# Patient Record
Sex: MALE | Race: WHITE | NOT HISPANIC OR LATINO | Employment: OTHER | URBAN - METROPOLITAN AREA
[De-identification: names, ages, dates, MRNs, and addresses within clinical notes are randomized per-mention and may not be internally consistent; named-entity substitution may affect disease eponyms.]

---

## 2018-01-04 ENCOUNTER — APPOINTMENT (OUTPATIENT)
Dept: RADIOLOGY | Facility: MEDICAL CENTER | Age: 48
End: 2018-01-04
Attending: EMERGENCY MEDICINE
Payer: COMMERCIAL

## 2018-01-04 ENCOUNTER — HOSPITAL ENCOUNTER (EMERGENCY)
Facility: MEDICAL CENTER | Age: 48
End: 2018-01-04
Attending: EMERGENCY MEDICINE
Payer: COMMERCIAL

## 2018-01-04 VITALS
OXYGEN SATURATION: 93 % | DIASTOLIC BLOOD PRESSURE: 92 MMHG | WEIGHT: 200 LBS | RESPIRATION RATE: 16 BRPM | TEMPERATURE: 96.8 F | HEIGHT: 70 IN | SYSTOLIC BLOOD PRESSURE: 152 MMHG | HEART RATE: 72 BPM | BODY MASS INDEX: 28.63 KG/M2

## 2018-01-04 DIAGNOSIS — S93.402A SPRAIN OF LEFT ANKLE, UNSPECIFIED LIGAMENT, INITIAL ENCOUNTER: ICD-10-CM
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
## 2018-01-04 DIAGNOSIS — S09.90XA CLOSED HEAD INJURY, INITIAL ENCOUNTER: ICD-10-CM

## 2018-01-04 DIAGNOSIS — S16.1XXA STRAIN OF NECK MUSCLE, INITIAL ENCOUNTER: ICD-10-CM

## 2018-01-04 PROCEDURE — 72125 CT NECK SPINE W/O DYE: CPT

## 2018-01-04 PROCEDURE — 305948 HCHG GREEN TRAUMA ACT PRE-NOTIFY NO CC

## 2018-01-04 PROCEDURE — 71045 X-RAY EXAM CHEST 1 VIEW: CPT

## 2018-01-04 PROCEDURE — 70450 CT HEAD/BRAIN W/O DYE: CPT

## 2018-01-04 PROCEDURE — 73610 X-RAY EXAM OF ANKLE: CPT | Mod: LT

## 2018-01-04 PROCEDURE — 99285 EMERGENCY DEPT VISIT HI MDM: CPT

## 2018-01-04 PROCEDURE — 700111 HCHG RX REV CODE 636 W/ 250 OVERRIDE (IP): Performed by: EMERGENCY MEDICINE

## 2018-01-04 PROCEDURE — 96374 THER/PROPH/DIAG INJ IV PUSH: CPT

## 2018-01-04 RX ORDER — CYCLOBENZAPRINE HCL 10 MG
10 TABLET ORAL 3 TIMES DAILY PRN
Qty: 20 TAB | Refills: 0 | Status: SHIPPED | OUTPATIENT
Start: 2018-01-04 | End: 2018-09-04

## 2018-01-04 RX ORDER — OMEPRAZOLE 20 MG/1
20 CAPSULE, DELAYED RELEASE ORAL DAILY
Status: SHIPPED | COMMUNITY
End: 2023-06-22

## 2018-01-04 RX ORDER — KETOROLAC TROMETHAMINE 30 MG/ML
30 INJECTION, SOLUTION INTRAMUSCULAR; INTRAVENOUS ONCE
Status: COMPLETED | OUTPATIENT
Start: 2018-01-04 | End: 2018-01-04

## 2018-01-04 RX ORDER — CYCLOBENZAPRINE HCL 5 MG
5-10 TABLET ORAL 3 TIMES DAILY PRN
Qty: 30 TAB | Refills: 0 | Status: SHIPPED | OUTPATIENT
Start: 2018-01-04 | End: 2018-09-04

## 2018-01-04 RX ADMIN — KETOROLAC TROMETHAMINE 30 MG: 30 INJECTION, SOLUTION INTRAMUSCULAR at 14:23

## 2018-01-04 ASSESSMENT — PAIN SCALES - GENERAL: PAINLEVEL_OUTOF10: ASSUMED PAIN PRESENT
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
## 2018-01-04 NOTE — ED NOTES
Report received, assuming care.  Pt returns from radiology, awaiting results.  Multiple visitors at the bedside.
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
## 2018-01-05 NOTE — ED NOTES
Air splint applied.  Pt ready for discharge.  Pt dc instructions provided.  Pt verbalized understanding & signed.  Leaves ER ambulatory, steady gait, no distress.  All possessions with pt upon discharge.

## 2018-09-04 ENCOUNTER — OFFICE VISIT (OUTPATIENT)
Dept: URGENT CARE | Facility: PHYSICIAN GROUP | Age: 48
End: 2018-09-04
Payer: COMMERCIAL

## 2018-09-04 VITALS
DIASTOLIC BLOOD PRESSURE: 102 MMHG | RESPIRATION RATE: 16 BRPM | HEART RATE: 94 BPM | HEIGHT: 71 IN | SYSTOLIC BLOOD PRESSURE: 140 MMHG | TEMPERATURE: 98.2 F | OXYGEN SATURATION: 95 % | BODY MASS INDEX: 29.96 KG/M2 | WEIGHT: 214 LBS

## 2018-09-04 DIAGNOSIS — S39.012A STRAIN OF LUMBAR REGION, INITIAL ENCOUNTER: ICD-10-CM

## 2018-09-04 PROCEDURE — 99214 OFFICE O/P EST MOD 30 MIN: CPT | Performed by: FAMILY MEDICINE

## 2018-09-04 RX ORDER — IBUPROFEN 200 MG
200 TABLET ORAL EVERY 6 HOURS PRN
COMMUNITY
End: 2018-09-04

## 2018-09-04 RX ORDER — DICLOFENAC SODIUM 75 MG/1
75 TABLET, DELAYED RELEASE ORAL 2 TIMES DAILY
Qty: 60 TAB | Refills: 0 | Status: ON HOLD | OUTPATIENT
Start: 2018-09-04 | End: 2021-01-06

## 2018-09-04 RX ORDER — CYCLOBENZAPRINE HCL 10 MG
10 TABLET ORAL 3 TIMES DAILY PRN
Qty: 20 TAB | Refills: 0 | Status: ON HOLD | OUTPATIENT
Start: 2018-09-04 | End: 2021-01-06

## 2018-09-04 ASSESSMENT — ENCOUNTER SYMPTOMS
COUGH: 0
FEVER: 0
CHILLS: 0
BACK PAIN: 1
VOMITING: 0
SHORTNESS OF BREATH: 0
NAUSEA: 0
WEIGHT LOSS: 0
DIARRHEA: 0
ABDOMINAL PAIN: 0
HEADACHES: 0
SORE THROAT: 0

## 2018-09-04 ASSESSMENT — PAIN SCALES - GENERAL: PAINLEVEL: 7=MODERATE-SEVERE PAIN

## 2018-09-04 NOTE — PROGRESS NOTES
Subjective:     Prakash Kaur is a 47 y.o. male who presents for Back Injury (x2days )       HPI   2 days ago was getting out of a raised truck and landed hard on his feet.  Felt he landed a little awkwardly and off balance.  Immediately had increase in back pain   Back pain is constant and dull, worst in the low back  No radiating pain down the legs   Sitting in recliner helps pain the most  Sitting without a back rest hurts   Pain is bilateral low back   Has tried Advil and a heating pad with some improvement   Pt with hx of back pain for the past 8 months which is intermittent and was feeling good before this new injury     Past Medical History:   Diagnosis Date   • Allergic rhinitis    • Anesthesia     doesn't do well with novacaine/lidocaine, ponv   • Arthritis    • Asthma    • Asthma    • Eosinophilic esophagitis 2011    Per GI Biopsy   • GERD (gastroesophageal reflux disease)    • Indigestion      Past Surgical History:   Procedure Laterality Date   • INGUINAL HERNIA LAPAROSCOPIC  8/29/2012    Performed by ROLAND POLLOCK at SUR[facility]S   • INGUINAL HERNIA REPAIR  11/30/2011    Performed by ROLAND POLLOCK at SUR[facility] OR[facility]   • EGD WITH ASP/BX  10/11    Dr Brice.  BX negative for Piper's     Social History     Social History   • Marital status:      Spouse name: N/A   • Number of children: N/A   • Years of education: N/A     Occupational History   •  [employer]     Social History Main Topics   • Smoking status: Never Smoker   • Smokeless tobacco: Never Used   • Alcohol use Yes      Comment: 2 days per week   • Drug use: No   • Sexual activity: Yes     Partners: Female     Other Topics Concern   • Not on file     Social History Narrative    ** Merged History Encounter **           Family History   Problem Relation Age of Onset   • Stroke Paternal Uncle    • Cancer Maternal Grandmother    • Cancer Paternal Grandmother    • Cancer Paternal  "Grandfather    • Heart Disease Mother    • Lung Disease Daughter     Review of Systems   Constitutional: Negative for chills, fever and weight loss.   HENT: Negative for congestion and sore throat.    Respiratory: Negative for cough and shortness of breath.    Cardiovascular: Negative for chest pain.   Gastrointestinal: Negative for abdominal pain, diarrhea, nausea and vomiting.   Musculoskeletal: Positive for back pain.   Skin: Negative for rash.   Neurological: Negative for headaches.     Allergies   Allergen Reactions   • Nkda [No Known Drug Allergy]       Objective:   /102   Pulse 94   Temp 36.8 °C (98.2 °F)   Resp 16   Ht 1.803 m (5' 11\")   Wt 97.1 kg (214 lb)   SpO2 95%   BMI 29.85 kg/m²   Physical Exam   Constitutional: He appears well-developed and well-nourished. No distress.   HENT:   Head: Normocephalic and atraumatic.   Right Ear: External ear normal.   Left Ear: External ear normal.   Neck: Normal range of motion. No tracheal deviation present.   Cardiovascular: Normal rate, regular rhythm and normal heart sounds.    Pulmonary/Chest: Effort normal and breath sounds normal. No respiratory distress. He has no wheezes. He has no rales.   Musculoskeletal:   Back:  General: No asymmetry, bruising, or erythema appreciated  ROM: flexion 60°, extension 10°, lateral bend 30°, lateral twist 30°  Palpation: No tenderness to palpation of spinous processes, no step-offs appreciated, mild TTP of paraspinal muscles, no significant scoliosis appreciated  Strength: 5/5 hip/knee/ankle flexion/extension  Special tests: Straight leg raise -   Neuro: Sensation intact and equal bilaterally in LE's     Neurological: He is alert.   Skin: Skin is dry. No rash noted. He is not diaphoretic.   Psychiatric: He has a normal mood and affect.         Assessment/Plan:   Assessment    1. Strain of lumbar region, initial encounter  Advised that pt keep walking and moving, and advised against total rest.  Will give diclofenac " 75mg BID and Flexeril to be used at night.  Pt has hurt back previously and may need to be referred to PT if not rapidly improving over next 2 weeks.   - cyclobenzaprine (FLEXERIL) 10 MG Tab; Take 1 Tab by mouth 3 times a day as needed.  Dispense: 20 Tab; Refill: 0  - diclofenac EC (VOLTAREN) 75 MG Tablet Delayed Response; Take 1 Tab by mouth 2 times a day.  Dispense: 60 Tab; Refill: 0    F/U if not improving over next 2 weeks         I have reviewed and agree with history, assessment and plan for this office encounter with Dr. Patterson  Face to face encounter/direct observation:  YES   Suggested changes to plan or follow-up: NONE  [place] M.D.

## 2019-05-18 ENCOUNTER — APPOINTMENT (OUTPATIENT)
Dept: RADIOLOGY | Facility: MEDICAL CENTER | Age: 49
End: 2019-05-18
Payer: COMMERCIAL

## 2019-05-18 ENCOUNTER — HOSPITAL ENCOUNTER (EMERGENCY)
Facility: MEDICAL CENTER | Age: 49
End: 2019-05-18
Payer: COMMERCIAL

## 2019-05-18 VITALS
BODY MASS INDEX: 30.46 KG/M2 | RESPIRATION RATE: 18 BRPM | SYSTOLIC BLOOD PRESSURE: 143 MMHG | TEMPERATURE: 97.9 F | OXYGEN SATURATION: 94 % | DIASTOLIC BLOOD PRESSURE: 106 MMHG | HEIGHT: 71 IN | HEART RATE: 89 BPM | WEIGHT: 217.59 LBS

## 2019-05-18 LAB
ALBUMIN SERPL BCP-MCNC: 4.4 G/DL (ref 3.2–4.9)
ALBUMIN/GLOB SERPL: 1.4 G/DL
ALP SERPL-CCNC: 55 U/L (ref 30–99)
ALT SERPL-CCNC: 24 U/L (ref 2–50)
ANION GAP SERPL CALC-SCNC: 9 MMOL/L (ref 0–11.9)
AST SERPL-CCNC: 17 U/L (ref 12–45)
BASOPHILS # BLD AUTO: 0.9 % (ref 0–1.8)
BASOPHILS # BLD: 0.05 K/UL (ref 0–0.12)
BILIRUB SERPL-MCNC: 1.1 MG/DL (ref 0.1–1.5)
BUN SERPL-MCNC: 16 MG/DL (ref 8–22)
CALCIUM SERPL-MCNC: 9.2 MG/DL (ref 8.4–10.2)
CHLORIDE SERPL-SCNC: 105 MMOL/L (ref 96–112)
CO2 SERPL-SCNC: 24 MMOL/L (ref 20–33)
CREAT SERPL-MCNC: 1.09 MG/DL (ref 0.5–1.4)
EKG IMPRESSION: NORMAL
EOSINOPHIL # BLD AUTO: 0.18 K/UL (ref 0–0.51)
EOSINOPHIL NFR BLD: 3.2 % (ref 0–6.9)
ERYTHROCYTE [DISTWIDTH] IN BLOOD BY AUTOMATED COUNT: 37 FL (ref 35.9–50)
GLOBULIN SER CALC-MCNC: 3.2 G/DL (ref 1.9–3.5)
GLUCOSE SERPL-MCNC: 97 MG/DL (ref 65–99)
HCT VFR BLD AUTO: 47.3 % (ref 42–52)
HGB BLD-MCNC: 16.7 G/DL (ref 14–18)
IMM GRANULOCYTES # BLD AUTO: 0.02 K/UL (ref 0–0.11)
IMM GRANULOCYTES NFR BLD AUTO: 0.4 % (ref 0–0.9)
LYMPHOCYTES # BLD AUTO: 2.05 K/UL (ref 1–4.8)
LYMPHOCYTES NFR BLD: 36 % (ref 22–41)
MCH RBC QN AUTO: 30.4 PG (ref 27–33)
MCHC RBC AUTO-ENTMCNC: 35.3 G/DL (ref 33.7–35.3)
MCV RBC AUTO: 86.2 FL (ref 81.4–97.8)
MONOCYTES # BLD AUTO: 0.35 K/UL (ref 0–0.85)
MONOCYTES NFR BLD AUTO: 6.2 % (ref 0–13.4)
NEUTROPHILS # BLD AUTO: 3.04 K/UL (ref 1.82–7.42)
NEUTROPHILS NFR BLD: 53.3 % (ref 44–72)
NRBC # BLD AUTO: 0 K/UL
NRBC BLD-RTO: 0 /100 WBC
PLATELET # BLD AUTO: 212 K/UL (ref 164–446)
PMV BLD AUTO: 10.2 FL (ref 9–12.9)
POTASSIUM SERPL-SCNC: 3.8 MMOL/L (ref 3.6–5.5)
PROT SERPL-MCNC: 7.6 G/DL (ref 6–8.2)
RBC # BLD AUTO: 5.49 M/UL (ref 4.7–6.1)
SODIUM SERPL-SCNC: 138 MMOL/L (ref 135–145)
TROPONIN I SERPL-MCNC: <0.02 NG/ML (ref 0–0.04)
WBC # BLD AUTO: 5.7 K/UL (ref 4.8–10.8)

## 2019-05-18 PROCEDURE — 85025 COMPLETE CBC W/AUTO DIFF WBC: CPT

## 2019-05-18 PROCEDURE — 302449 STATCHG TRIAGE ONLY (STATISTIC)

## 2019-05-18 PROCEDURE — 80053 COMPREHEN METABOLIC PANEL: CPT

## 2019-05-18 PROCEDURE — 84484 ASSAY OF TROPONIN QUANT: CPT

## 2019-05-18 PROCEDURE — 93005 ELECTROCARDIOGRAM TRACING: CPT

## 2019-05-19 ENCOUNTER — HOSPITAL ENCOUNTER (EMERGENCY)
Dept: HOSPITAL 8 - ED | Age: 49
Discharge: HOME | End: 2019-05-19
Payer: COMMERCIAL

## 2019-05-19 VITALS — DIASTOLIC BLOOD PRESSURE: 88 MMHG | SYSTOLIC BLOOD PRESSURE: 144 MMHG

## 2019-05-19 VITALS — HEIGHT: 71 IN | BODY MASS INDEX: 28.09 KG/M2 | WEIGHT: 200.62 LBS

## 2019-05-19 DIAGNOSIS — J45.909: ICD-10-CM

## 2019-05-19 DIAGNOSIS — K21.9: ICD-10-CM

## 2019-05-19 DIAGNOSIS — R00.2: Primary | ICD-10-CM

## 2019-05-19 LAB
ALBUMIN SERPL-MCNC: 3.7 G/DL (ref 3.4–5)
ALP SERPL-CCNC: 62 U/L (ref 45–117)
ALT SERPL-CCNC: 30 U/L (ref 12–78)
ANION GAP SERPL CALC-SCNC: 9 MMOL/L (ref 5–15)
BASOPHILS # BLD AUTO: 0.07 X10^3/UL (ref 0–0.1)
BASOPHILS NFR BLD AUTO: 1 % (ref 0–1)
BILIRUB SERPL-MCNC: 0.7 MG/DL (ref 0.2–1)
CALCIUM SERPL-MCNC: 8.6 MG/DL (ref 8.5–10.1)
CHLORIDE SERPL-SCNC: 111 MMOL/L (ref 98–107)
CREAT SERPL-MCNC: 1.07 MG/DL (ref 0.7–1.3)
EOSINOPHIL # BLD AUTO: 0.18 X10^3/UL (ref 0–0.4)
EOSINOPHIL NFR BLD AUTO: 3 % (ref 1–7)
ERYTHROCYTE [DISTWIDTH] IN BLOOD BY AUTOMATED COUNT: 12.3 % (ref 9.4–14.8)
LYMPHOCYTES # BLD AUTO: 2.02 X10^3/UL (ref 1–3.4)
LYMPHOCYTES NFR BLD AUTO: 30 % (ref 22–44)
MCH RBC QN AUTO: 31.2 PG (ref 27.5–34.5)
MCHC RBC AUTO-ENTMCNC: 33.9 G/DL (ref 33.2–36.2)
MCV RBC AUTO: 92.2 FL (ref 81–97)
MD: NO
MONOCYTES # BLD AUTO: 0.34 X10^3/UL (ref 0.2–0.8)
MONOCYTES NFR BLD AUTO: 5 % (ref 2–9)
NEUTROPHILS # BLD AUTO: 4.15 X10^3/UL (ref 1.8–6.8)
NEUTROPHILS NFR BLD AUTO: 61 % (ref 42–75)
PLATELET # BLD AUTO: 205 X10^3/UL (ref 130–400)
PMV BLD AUTO: 9 FL (ref 7.4–10.4)
PROT SERPL-MCNC: 7.2 G/DL (ref 6.4–8.2)
RBC # BLD AUTO: 5.15 X10^6/UL (ref 4.38–5.82)
T4 FREE SERPL-MCNC: 0.95 NG/DL (ref 0.76–1.46)
TROPONIN I SERPL-MCNC: < 0.015 NG/ML (ref 0–0.04)
TSH SERPL-ACNC: 1.02 MIU/L (ref 0.36–3.74)

## 2019-05-19 PROCEDURE — 36415 COLL VENOUS BLD VENIPUNCTURE: CPT

## 2019-05-19 PROCEDURE — 84439 ASSAY OF FREE THYROXINE: CPT

## 2019-05-19 PROCEDURE — 93005 ELECTROCARDIOGRAM TRACING: CPT

## 2019-05-19 PROCEDURE — 99284 EMERGENCY DEPT VISIT MOD MDM: CPT

## 2019-05-19 PROCEDURE — 71045 X-RAY EXAM CHEST 1 VIEW: CPT

## 2019-05-19 PROCEDURE — 80053 COMPREHEN METABOLIC PANEL: CPT

## 2019-05-19 PROCEDURE — 85025 COMPLETE CBC W/AUTO DIFF WBC: CPT

## 2019-05-19 PROCEDURE — 84481 FREE ASSAY (FT-3): CPT

## 2019-05-19 PROCEDURE — 84484 ASSAY OF TROPONIN QUANT: CPT

## 2019-05-19 PROCEDURE — 84443 ASSAY THYROID STIM HORMONE: CPT

## 2021-01-03 ENCOUNTER — HOSPITAL ENCOUNTER (OUTPATIENT)
Facility: MEDICAL CENTER | Age: 51
End: 2021-01-03
Attending: FAMILY MEDICINE
Payer: COMMERCIAL

## 2021-01-03 ENCOUNTER — HOSPITAL ENCOUNTER (OUTPATIENT)
Dept: RADIOLOGY | Facility: MEDICAL CENTER | Age: 51
End: 2021-01-03
Attending: FAMILY MEDICINE
Payer: COMMERCIAL

## 2021-01-03 ENCOUNTER — OFFICE VISIT (OUTPATIENT)
Dept: URGENT CARE | Facility: PHYSICIAN GROUP | Age: 51
End: 2021-01-03
Payer: COMMERCIAL

## 2021-01-03 VITALS
OXYGEN SATURATION: 96 % | HEART RATE: 81 BPM | DIASTOLIC BLOOD PRESSURE: 94 MMHG | HEIGHT: 71 IN | WEIGHT: 225 LBS | TEMPERATURE: 97.9 F | SYSTOLIC BLOOD PRESSURE: 162 MMHG | BODY MASS INDEX: 31.5 KG/M2 | RESPIRATION RATE: 16 BRPM

## 2021-01-03 DIAGNOSIS — R05.9 COUGH: ICD-10-CM

## 2021-01-03 DIAGNOSIS — J98.11 DISCOID ATELECTASIS: ICD-10-CM

## 2021-01-03 DIAGNOSIS — R06.02 SHORTNESS OF BREATH: ICD-10-CM

## 2021-01-03 DIAGNOSIS — I26.99 ACUTE PULMONARY EMBOLISM, UNSPECIFIED PULMONARY EMBOLISM TYPE, UNSPECIFIED WHETHER ACUTE COR PULMONALE PRESENT (HCC): ICD-10-CM

## 2021-01-03 DIAGNOSIS — R03.0 ELEVATED BLOOD PRESSURE READING: ICD-10-CM

## 2021-01-03 LAB
BASOPHILS # BLD AUTO: 0.4 % (ref 0–1.8)
BASOPHILS # BLD: 0.03 K/UL (ref 0–0.12)
COVID ORDER STATUS COVID19: NORMAL
D DIMER PPP IA.FEU-MCNC: 1.91 UG/ML (FEU) (ref 0–0.5)
EOSINOPHIL # BLD AUTO: 0.19 K/UL (ref 0–0.51)
EOSINOPHIL NFR BLD: 2.8 % (ref 0–6.9)
ERYTHROCYTE [DISTWIDTH] IN BLOOD BY AUTOMATED COUNT: 38.9 FL (ref 35.9–50)
HCT VFR BLD AUTO: 45.2 % (ref 42–52)
HGB BLD-MCNC: 15.2 G/DL (ref 14–18)
IMM GRANULOCYTES # BLD AUTO: 0.04 K/UL (ref 0–0.11)
IMM GRANULOCYTES NFR BLD AUTO: 0.6 % (ref 0–0.9)
LYMPHOCYTES # BLD AUTO: 2.12 K/UL (ref 1–4.8)
LYMPHOCYTES NFR BLD: 31.4 % (ref 22–41)
MCH RBC QN AUTO: 30.3 PG (ref 27–33)
MCHC RBC AUTO-ENTMCNC: 33.6 G/DL (ref 33.7–35.3)
MCV RBC AUTO: 90 FL (ref 81.4–97.8)
MONOCYTES # BLD AUTO: 0.61 K/UL (ref 0–0.85)
MONOCYTES NFR BLD AUTO: 9 % (ref 0–13.4)
NEUTROPHILS # BLD AUTO: 3.76 K/UL (ref 1.82–7.42)
NEUTROPHILS NFR BLD: 55.8 % (ref 44–72)
NRBC # BLD AUTO: 0 K/UL
NRBC BLD-RTO: 0 /100 WBC
PLATELET # BLD AUTO: 190 K/UL (ref 164–446)
PMV BLD AUTO: 10.7 FL (ref 9–12.9)
RBC # BLD AUTO: 5.02 M/UL (ref 4.7–6.1)
WBC # BLD AUTO: 6.8 K/UL (ref 4.8–10.8)

## 2021-01-03 PROCEDURE — U0003 INFECTIOUS AGENT DETECTION BY NUCLEIC ACID (DNA OR RNA); SEVERE ACUTE RESPIRATORY SYNDROME CORONAVIRUS 2 (SARS-COV-2) (CORONAVIRUS DISEASE [COVID-19]), AMPLIFIED PROBE TECHNIQUE, MAKING USE OF HIGH THROUGHPUT TECHNOLOGIES AS DESCRIBED BY CMS-2020-01-R: HCPCS

## 2021-01-03 PROCEDURE — 71046 X-RAY EXAM CHEST 2 VIEWS: CPT

## 2021-01-03 PROCEDURE — 99214 OFFICE O/P EST MOD 30 MIN: CPT | Mod: CS | Performed by: FAMILY MEDICINE

## 2021-01-03 PROCEDURE — 85379 FIBRIN DEGRADATION QUANT: CPT

## 2021-01-03 PROCEDURE — 85025 COMPLETE CBC W/AUTO DIFF WBC: CPT

## 2021-01-03 PROCEDURE — U0005 INFEC AGEN DETEC AMPLI PROBE: HCPCS

## 2021-01-03 ASSESSMENT — FIBROSIS 4 INDEX: FIB4 SCORE: 0.79

## 2021-01-03 NOTE — PROGRESS NOTES
Chief Complaint   Patient presents with   • Shortness of Breath     SOB wheezing chest pain left should pain cough, x3 days had recent COVID vacine             Cough  This is a new problem. The current episode started 3 days ago. The problem has been unchanged. The problem occurs constantly. The cough is dry.   + left upper chest pain with deep inspirations.  + sob   Has hx asthma, but denies wheezing    Pt denies: fatigue, muscle aches, fever. Pertinent negatives include no   headaches, nausea, vomiting, diarrhea, sweats, weight loss or wheezing. Nothing aggravates the symptoms.  Patient has tried nothing for the symptoms.     Denies COVID exposure     Past Medical History:   Diagnosis Date   • Eosinophilic esophagitis 2011    Per GI Biopsy   • Allergic rhinitis    • Anesthesia     doesn't do well with novacaine/lidocaine, ponv   • Arthritis    • Asthma    • Asthma    • GERD (gastroesophageal reflux disease)    • Indigestion          Social History     Tobacco Use   • Smoking status: Never Smoker   • Smokeless tobacco: Never Used   Substance Use Topics   • Alcohol use: Yes     Comment: 2 days per week   • Drug use: No         Current Outpatient Medications on File Prior to Visit   Medication Sig Dispense Refill   • omeprazole (PRILOSEC) 20 MG delayed-release capsule Take 20 mg by mouth every day.     • albuterol (VENTOLIN OR PROVENTIL) 108 (90 BASE) MCG/ACT Aero Soln inhalation aerosol Inhale 2 Puffs by mouth every four hours as needed for Shortness of Breath. 8.5 g 3   • cetirizine (ZYRTEC) 10 MG TABS Take 10 mg by mouth every day.     • lansoprazole (PREVACID) 30 MG CPDR Take 30 mg by mouth every day.     • cyclobenzaprine (FLEXERIL) 10 MG Tab Take 1 Tab by mouth 3 times a day as needed. (Patient not taking: Reported on 1/3/2021) 20 Tab 0   • diclofenac EC (VOLTAREN) 75 MG Tablet Delayed Response Take 1 Tab by mouth 2 times a day. (Patient not taking: Reported on 1/3/2021) 60 Tab 0   • azithromycin (ZITHROMAX)  "250 MG Tab Take as directed (Patient not taking: Reported on 1/3/2021) 6 Tab 0   • predniSONE (DELTASONE) 20 MG Tab 2 tabs PO daily x 3 days then 1 daily x 2 days (Patient not taking: Reported on 1/3/2021) 8 Tab 0   • doxycycline (VIBRAMYCIN) 100 MG Tab Take 1 Tab by mouth 2 times a day. (Patient not taking: Reported on 1/3/2021) 20 Tab 0   • hydrocodone-acetaminophen (NORCO) 5-325 MG TABS per tablet Take 1-2 Tabs by mouth every 6 hours as needed (severe pain ). (Patient not taking: Reported on 1/3/2021) 20 Tab 0     No current facility-administered medications on file prior to visit.              Family history was reviewed and not pertinent         Review of Systems   Constitutional: Negative for fever, chills and malaise/fatigue.   Eyes: Negative for vision changes, d/c.    Respiratory: + for cough .   Denies sputum production.    Cardiovascular: Negative for chest pain and palpitations.   Gastrointestinal: Negative for nausea, vomiting, abdominal pain, diarrhea and constipation.   Genitourinary: Negative for dysuria, urgency and frequency.   Skin: Negative for rash or  itching.   Neurological: Negative for dizziness and tingling.   Psychiatric/Behavioral: Negative for depression.   Hematologic/lymphatic - denies bruising or excessive bleeding  All other systems reviewed and are negative.         Objective:     BP (!) 162/94   Pulse 81   Temp 36.6 °C (97.9 °F)   Resp 16   Ht 1.803 m (5' 11\")   Wt 102.1 kg (225 lb)   SpO2 96%     Physical Exam   Constitutional: patient is oriented to person, place, and time. Patient appears well-developed and well-nourished. No distress.   HENT:   Head: Normocephalic and atraumatic.   Right Ear: External ear normal.   Left Ear: External ear normal.   Nose: Mucosal edema  present. Right sinus exhibits no maxillary sinus tenderness. Left sinus exhibits no maxillary sinus tenderness.   Mouth/Throat: Mucous membranes are normal. No oral lesions.  No posterior pharyngeal " erythema.  No oropharyngeal exudate or posterior oropharyngeal edema.   Eyes: Conjunctivae and EOM are normal. Pupils are equal, round, and reactive to light. Right eye exhibits no discharge. Left eye exhibits no discharge. No scleral icterus.   Neck: Normal range of motion. Neck supple. No tracheal deviation present.   Cardiovascular: Normal rate, regular rhythm and normal heart sounds.  Exam reveals no friction rub.    Pulmonary/Chest: Effort normal. No respiratory distress. Patient has no wheezes or rhonchi. Patient has no rales.   No tenderness with palpation  Musculoskeletal:  exhibits no edema.   Lymphadenopathy:     Patient has no cervical adenopathy.      Neurological: patient is alert and oriented to person, place, and time.   Skin: Skin is warm and dry. No rash noted. No erythema.   Psychiatric: patient  has a normal mood and affect.  behavior is normal.   Nursing note and vitals reviewed.            Details    Reading Physician Reading Date Result Priority   Doug Deras M.D.  [phone] 1/3/2021 Urgent Care      Narrative & Impression        1/3/2021 3:49 PM     HISTORY/REASON FOR EXAM:  Cough.        TECHNIQUE/EXAM DESCRIPTION AND NUMBER OF VIEWS:  Two views of the chest.     COMPARISON:  01/04/2018     FINDINGS:  The heart is normal in size.  Linear opacifications are noted in the left lung base. No consolidations are identified.  No pleural effusions are appreciated.  No pneumothorax is identified.     IMPRESSION:     1.  Probable discoid atelectasis in the left lung base.     2.  No consolidations are identified.     EKG interpretation - normal sinus rhythm. No ST or QRS morphology changes. QT interval is normal. Axis is positive. No signs of ischemia.    Assessment/Plan:        1. Cough      cxr personally reviewed.   Hypoinflation/atelectasis in left lung base.     Pt is not hypoxic    D-dimer ordered to r/o pulmonary embolus     - COVID/SARS COV-2 PCR; Future    2. Elevated blood pressure  reading  Likely secondary to pain  No hx HTN  Advised f/u PCP      3. Discoid atelectasis   Seen on cxr    D-dimer positive.   `    CT was personally reviewed with radiologist.   + bilat pulmonary emboli      Pt will present immediately to [facility] ED for further evaluation and treatment    Report called to transfer line

## 2021-01-04 ENCOUNTER — HOSPITAL ENCOUNTER (INPATIENT)
Facility: MEDICAL CENTER | Age: 51
LOS: 1 days | DRG: 176 | End: 2021-01-06
Attending: EMERGENCY MEDICINE | Admitting: STUDENT IN AN ORGANIZED HEALTH CARE EDUCATION/TRAINING PROGRAM
Payer: COMMERCIAL

## 2021-01-04 ENCOUNTER — HOSPITAL ENCOUNTER (OUTPATIENT)
Dept: RADIOLOGY | Facility: MEDICAL CENTER | Age: 51
End: 2021-01-04
Attending: FAMILY MEDICINE
Payer: COMMERCIAL

## 2021-01-04 DIAGNOSIS — R07.9 CHEST PAIN, UNSPECIFIED TYPE: ICD-10-CM

## 2021-01-04 DIAGNOSIS — R06.02 SOB (SHORTNESS OF BREATH): ICD-10-CM

## 2021-01-04 DIAGNOSIS — I26.99 ACUTE PULMONARY EMBOLISM, UNSPECIFIED PULMONARY EMBOLISM TYPE, UNSPECIFIED WHETHER ACUTE COR PULMONALE PRESENT (HCC): ICD-10-CM

## 2021-01-04 DIAGNOSIS — R06.02 SHORTNESS OF BREATH: ICD-10-CM

## 2021-01-04 LAB
ANION GAP SERPL CALC-SCNC: 9 MMOL/L (ref 7–16)
BASOPHILS # BLD AUTO: 0.7 % (ref 0–1.8)
BASOPHILS # BLD: 0.04 K/UL (ref 0–0.12)
BUN SERPL-MCNC: 10 MG/DL (ref 8–22)
CALCIUM SERPL-MCNC: 8.8 MG/DL (ref 8.4–10.2)
CHLORIDE SERPL-SCNC: 107 MMOL/L (ref 96–112)
CO2 SERPL-SCNC: 26 MMOL/L (ref 20–33)
CREAT SERPL-MCNC: 1.01 MG/DL (ref 0.5–1.4)
EKG IMPRESSION: NORMAL
EOSINOPHIL # BLD AUTO: 0.15 K/UL (ref 0–0.51)
EOSINOPHIL NFR BLD: 2.7 % (ref 0–6.9)
ERYTHROCYTE [DISTWIDTH] IN BLOOD BY AUTOMATED COUNT: 38.6 FL (ref 35.9–50)
GLUCOSE SERPL-MCNC: 93 MG/DL (ref 65–99)
HCT VFR BLD AUTO: 41.5 % (ref 42–52)
HGB BLD-MCNC: 14.4 G/DL (ref 14–18)
IMM GRANULOCYTES # BLD AUTO: 0.03 K/UL (ref 0–0.11)
IMM GRANULOCYTES NFR BLD AUTO: 0.5 % (ref 0–0.9)
LYMPHOCYTES # BLD AUTO: 1.85 K/UL (ref 1–4.8)
LYMPHOCYTES NFR BLD: 33.5 % (ref 22–41)
MCH RBC QN AUTO: 31.2 PG (ref 27–33)
MCHC RBC AUTO-ENTMCNC: 34.7 G/DL (ref 33.7–35.3)
MCV RBC AUTO: 89.8 FL (ref 81.4–97.8)
MONOCYTES # BLD AUTO: 0.39 K/UL (ref 0–0.85)
MONOCYTES NFR BLD AUTO: 7.1 % (ref 0–13.4)
NEUTROPHILS # BLD AUTO: 3.06 K/UL (ref 1.82–7.42)
NEUTROPHILS NFR BLD: 55.5 % (ref 44–72)
NRBC # BLD AUTO: 0 K/UL
NRBC BLD-RTO: 0 /100 WBC
PLATELET # BLD AUTO: 172 K/UL (ref 164–446)
PMV BLD AUTO: 10 FL (ref 9–12.9)
POTASSIUM SERPL-SCNC: 3.9 MMOL/L (ref 3.6–5.5)
RBC # BLD AUTO: 4.62 M/UL (ref 4.7–6.1)
SARS-COV-2 RNA RESP QL NAA+PROBE: NOTDETECTED
SODIUM SERPL-SCNC: 142 MMOL/L (ref 135–145)
SPECIMEN SOURCE: NORMAL
TROPONIN T SERPL-MCNC: 24 NG/L (ref 6–19)
WBC # BLD AUTO: 5.5 K/UL (ref 4.8–10.8)

## 2021-01-04 PROCEDURE — 36415 COLL VENOUS BLD VENIPUNCTURE: CPT

## 2021-01-04 PROCEDURE — 99285 EMERGENCY DEPT VISIT HI MDM: CPT

## 2021-01-04 PROCEDURE — 96372 THER/PROPH/DIAG INJ SC/IM: CPT

## 2021-01-04 PROCEDURE — 93005 ELECTROCARDIOGRAM TRACING: CPT | Performed by: EMERGENCY MEDICINE

## 2021-01-04 PROCEDURE — 84484 ASSAY OF TROPONIN QUANT: CPT

## 2021-01-04 PROCEDURE — 99220 PR INITIAL OBSERVATION CARE,LEVL III: CPT | Performed by: INTERNAL MEDICINE

## 2021-01-04 PROCEDURE — 80048 BASIC METABOLIC PNL TOTAL CA: CPT

## 2021-01-04 PROCEDURE — G0378 HOSPITAL OBSERVATION PER HR: HCPCS

## 2021-01-04 PROCEDURE — 71275 CT ANGIOGRAPHY CHEST: CPT

## 2021-01-04 PROCEDURE — 85025 COMPLETE CBC W/AUTO DIFF WBC: CPT

## 2021-01-04 PROCEDURE — 700111 HCHG RX REV CODE 636 W/ 250 OVERRIDE (IP): Performed by: EMERGENCY MEDICINE

## 2021-01-04 PROCEDURE — 93005 ELECTROCARDIOGRAM TRACING: CPT

## 2021-01-04 PROCEDURE — 700117 HCHG RX CONTRAST REV CODE 255: Performed by: FAMILY MEDICINE

## 2021-01-04 PROCEDURE — 700102 HCHG RX REV CODE 250 W/ 637 OVERRIDE(OP): Performed by: INTERNAL MEDICINE

## 2021-01-04 PROCEDURE — A9270 NON-COVERED ITEM OR SERVICE: HCPCS | Performed by: INTERNAL MEDICINE

## 2021-01-04 RX ORDER — OMEPRAZOLE 20 MG/1
20 CAPSULE, DELAYED RELEASE ORAL DAILY
Status: DISCONTINUED | OUTPATIENT
Start: 2021-01-05 | End: 2021-01-06 | Stop reason: HOSPADM

## 2021-01-04 RX ORDER — PROCHLORPERAZINE EDISYLATE 5 MG/ML
5-10 INJECTION INTRAMUSCULAR; INTRAVENOUS EVERY 4 HOURS PRN
Status: DISCONTINUED | OUTPATIENT
Start: 2021-01-04 | End: 2021-01-06 | Stop reason: HOSPADM

## 2021-01-04 RX ORDER — ONDANSETRON 2 MG/ML
4 INJECTION INTRAMUSCULAR; INTRAVENOUS EVERY 4 HOURS PRN
Status: DISCONTINUED | OUTPATIENT
Start: 2021-01-04 | End: 2021-01-06 | Stop reason: HOSPADM

## 2021-01-04 RX ORDER — CETIRIZINE HYDROCHLORIDE 10 MG/1
10 TABLET ORAL DAILY
Status: DISCONTINUED | OUTPATIENT
Start: 2021-01-05 | End: 2021-01-06 | Stop reason: HOSPADM

## 2021-01-04 RX ORDER — ALBUTEROL SULFATE 90 UG/1
2 AEROSOL, METERED RESPIRATORY (INHALATION)
Status: DISCONTINUED | OUTPATIENT
Start: 2021-01-04 | End: 2021-01-06 | Stop reason: HOSPADM

## 2021-01-04 RX ORDER — BISACODYL 10 MG
10 SUPPOSITORY, RECTAL RECTAL
Status: DISCONTINUED | OUTPATIENT
Start: 2021-01-04 | End: 2021-01-06 | Stop reason: HOSPADM

## 2021-01-04 RX ORDER — OXYCODONE HYDROCHLORIDE 5 MG/1
5 TABLET ORAL
Status: DISCONTINUED | OUTPATIENT
Start: 2021-01-04 | End: 2021-01-06 | Stop reason: HOSPADM

## 2021-01-04 RX ORDER — BUDESONIDE AND FORMOTEROL FUMARATE DIHYDRATE 80; 4.5 UG/1; UG/1
2 AEROSOL RESPIRATORY (INHALATION)
Status: DISCONTINUED | OUTPATIENT
Start: 2021-01-04 | End: 2021-01-06 | Stop reason: HOSPADM

## 2021-01-04 RX ORDER — PROMETHAZINE HYDROCHLORIDE 25 MG/1
12.5-25 TABLET ORAL EVERY 4 HOURS PRN
Status: DISCONTINUED | OUTPATIENT
Start: 2021-01-04 | End: 2021-01-06 | Stop reason: HOSPADM

## 2021-01-04 RX ORDER — ONDANSETRON 4 MG/1
4 TABLET, ORALLY DISINTEGRATING ORAL EVERY 4 HOURS PRN
Status: DISCONTINUED | OUTPATIENT
Start: 2021-01-04 | End: 2021-01-06 | Stop reason: HOSPADM

## 2021-01-04 RX ORDER — CYCLOBENZAPRINE HCL 10 MG
10 TABLET ORAL 3 TIMES DAILY PRN
Status: DISCONTINUED | OUTPATIENT
Start: 2021-01-04 | End: 2021-01-04

## 2021-01-04 RX ORDER — POLYETHYLENE GLYCOL 3350 17 G/17G
1 POWDER, FOR SOLUTION ORAL
Status: DISCONTINUED | OUTPATIENT
Start: 2021-01-04 | End: 2021-01-06 | Stop reason: HOSPADM

## 2021-01-04 RX ORDER — ACETAMINOPHEN 325 MG/1
650 TABLET ORAL EVERY 6 HOURS PRN
Status: DISCONTINUED | OUTPATIENT
Start: 2021-01-04 | End: 2021-01-06 | Stop reason: HOSPADM

## 2021-01-04 RX ORDER — OXYCODONE HYDROCHLORIDE 10 MG/1
10 TABLET ORAL
Status: DISCONTINUED | OUTPATIENT
Start: 2021-01-04 | End: 2021-01-06 | Stop reason: HOSPADM

## 2021-01-04 RX ORDER — MORPHINE SULFATE 4 MG/ML
4 INJECTION, SOLUTION INTRAMUSCULAR; INTRAVENOUS
Status: DISCONTINUED | OUTPATIENT
Start: 2021-01-04 | End: 2021-01-06 | Stop reason: HOSPADM

## 2021-01-04 RX ORDER — PROMETHAZINE HYDROCHLORIDE 25 MG/1
12.5-25 SUPPOSITORY RECTAL EVERY 4 HOURS PRN
Status: DISCONTINUED | OUTPATIENT
Start: 2021-01-04 | End: 2021-01-06 | Stop reason: HOSPADM

## 2021-01-04 RX ORDER — GUAIFENESIN/DEXTROMETHORPHAN 100-10MG/5
10 SYRUP ORAL EVERY 6 HOURS PRN
Status: DISCONTINUED | OUTPATIENT
Start: 2021-01-04 | End: 2021-01-06 | Stop reason: HOSPADM

## 2021-01-04 RX ORDER — AMOXICILLIN 250 MG
2 CAPSULE ORAL 2 TIMES DAILY
Status: DISCONTINUED | OUTPATIENT
Start: 2021-01-04 | End: 2021-01-06 | Stop reason: HOSPADM

## 2021-01-04 RX ADMIN — ENOXAPARIN SODIUM 100 MG: 100 INJECTION SUBCUTANEOUS at 18:12

## 2021-01-04 RX ADMIN — IOHEXOL 65 ML: 350 INJECTION, SOLUTION INTRAVENOUS at 16:52

## 2021-01-04 SDOH — HEALTH STABILITY: MENTAL HEALTH: HOW OFTEN DO YOU HAVE A DRINK CONTAINING ALCOHOL?: 4 OR MORE TIMES A WEEK

## 2021-01-04 ASSESSMENT — COGNITIVE AND FUNCTIONAL STATUS - GENERAL
SUGGESTED CMS G CODE MODIFIER MOBILITY: CH
DAILY ACTIVITIY SCORE: 24
MOBILITY SCORE: 24
SUGGESTED CMS G CODE MODIFIER DAILY ACTIVITY: CH

## 2021-01-04 ASSESSMENT — ENCOUNTER SYMPTOMS
SPEECH CHANGE: 0
HEARTBURN: 0
HALLUCINATIONS: 0
BRUISES/BLEEDS EASILY: 0
SPUTUM PRODUCTION: 0
FEVER: 0
NECK PAIN: 0
PALPITATIONS: 0
VOMITING: 0
CHILLS: 0
BLURRED VISION: 0
BACK PAIN: 0
SHORTNESS OF BREATH: 1
WEIGHT LOSS: 0
ORTHOPNEA: 0
FOCAL WEAKNESS: 0
NAUSEA: 0
PHOTOPHOBIA: 0
NERVOUS/ANXIOUS: 0
DOUBLE VISION: 0
POLYDIPSIA: 0
HEADACHES: 0
TREMORS: 0
FLANK PAIN: 0
HEMOPTYSIS: 0
COUGH: 1

## 2021-01-04 ASSESSMENT — FIBROSIS 4 INDEX: FIB4 SCORE: 0.88

## 2021-01-04 ASSESSMENT — LIFESTYLE VARIABLES: SUBSTANCE_ABUSE: 0

## 2021-01-04 ASSESSMENT — PAIN DESCRIPTION - PAIN TYPE: TYPE: ACUTE PAIN

## 2021-01-05 ENCOUNTER — APPOINTMENT (OUTPATIENT)
Dept: CARDIOLOGY | Facility: MEDICAL CENTER | Age: 51
DRG: 176 | End: 2021-01-05
Attending: INTERNAL MEDICINE
Payer: COMMERCIAL

## 2021-01-05 ENCOUNTER — APPOINTMENT (OUTPATIENT)
Dept: RADIOLOGY | Facility: MEDICAL CENTER | Age: 51
DRG: 176 | End: 2021-01-05
Attending: STUDENT IN AN ORGANIZED HEALTH CARE EDUCATION/TRAINING PROGRAM
Payer: COMMERCIAL

## 2021-01-05 PROBLEM — R74.8 ELEVATED LIVER ENZYMES: Status: ACTIVE | Noted: 2021-01-05

## 2021-01-05 LAB
ALBUMIN SERPL BCP-MCNC: 3.5 G/DL (ref 3.2–4.9)
ALBUMIN/GLOB SERPL: 1.3 G/DL
ALP SERPL-CCNC: 125 U/L (ref 30–99)
ALT SERPL-CCNC: 104 U/L (ref 2–50)
ANION GAP SERPL CALC-SCNC: 10 MMOL/L (ref 7–16)
AST SERPL-CCNC: 63 U/L (ref 12–45)
BASOPHILS # BLD AUTO: 0.6 % (ref 0–1.8)
BASOPHILS # BLD: 0.03 K/UL (ref 0–0.12)
BILIRUB SERPL-MCNC: 0.6 MG/DL (ref 0.1–1.5)
BUN SERPL-MCNC: 11 MG/DL (ref 8–22)
CALCIUM SERPL-MCNC: 8.7 MG/DL (ref 8.4–10.2)
CHLORIDE SERPL-SCNC: 110 MMOL/L (ref 96–112)
CO2 SERPL-SCNC: 26 MMOL/L (ref 20–33)
CREAT SERPL-MCNC: 1.07 MG/DL (ref 0.5–1.4)
EOSINOPHIL # BLD AUTO: 0.14 K/UL (ref 0–0.51)
EOSINOPHIL NFR BLD: 2.8 % (ref 0–6.9)
ERYTHROCYTE [DISTWIDTH] IN BLOOD BY AUTOMATED COUNT: 38.6 FL (ref 35.9–50)
GLOBULIN SER CALC-MCNC: 2.8 G/DL (ref 1.9–3.5)
GLUCOSE SERPL-MCNC: 79 MG/DL (ref 65–99)
HCT VFR BLD AUTO: 40.7 % (ref 42–52)
HGB BLD-MCNC: 13.9 G/DL (ref 14–18)
IMM GRANULOCYTES # BLD AUTO: 0.02 K/UL (ref 0–0.11)
IMM GRANULOCYTES NFR BLD AUTO: 0.4 % (ref 0–0.9)
LV EJECT FRACT  99904: 55
LV EJECT FRACT MOD 2C 99903: 51.43
LV EJECT FRACT MOD 4C 99902: 57.66
LV EJECT FRACT MOD BP 99901: 54.01
LYMPHOCYTES # BLD AUTO: 1.79 K/UL (ref 1–4.8)
LYMPHOCYTES NFR BLD: 35.8 % (ref 22–41)
MCH RBC QN AUTO: 30.7 PG (ref 27–33)
MCHC RBC AUTO-ENTMCNC: 34.2 G/DL (ref 33.7–35.3)
MCV RBC AUTO: 89.8 FL (ref 81.4–97.8)
MONOCYTES # BLD AUTO: 0.4 K/UL (ref 0–0.85)
MONOCYTES NFR BLD AUTO: 8 % (ref 0–13.4)
NEUTROPHILS # BLD AUTO: 2.62 K/UL (ref 1.82–7.42)
NEUTROPHILS NFR BLD: 52.4 % (ref 44–72)
NRBC # BLD AUTO: 0 K/UL
NRBC BLD-RTO: 0 /100 WBC
PLATELET # BLD AUTO: 185 K/UL (ref 164–446)
PMV BLD AUTO: 10.7 FL (ref 9–12.9)
POTASSIUM SERPL-SCNC: 4.2 MMOL/L (ref 3.6–5.5)
PROT SERPL-MCNC: 6.3 G/DL (ref 6–8.2)
RBC # BLD AUTO: 4.53 M/UL (ref 4.7–6.1)
SODIUM SERPL-SCNC: 146 MMOL/L (ref 135–145)
WBC # BLD AUTO: 5 K/UL (ref 4.8–10.8)

## 2021-01-05 PROCEDURE — 700111 HCHG RX REV CODE 636 W/ 250 OVERRIDE (IP): Performed by: INTERNAL MEDICINE

## 2021-01-05 PROCEDURE — 93970 EXTREMITY STUDY: CPT | Mod: 26 | Performed by: INTERNAL MEDICINE

## 2021-01-05 PROCEDURE — A9270 NON-COVERED ITEM OR SERVICE: HCPCS | Performed by: INTERNAL MEDICINE

## 2021-01-05 PROCEDURE — 99232 SBSQ HOSP IP/OBS MODERATE 35: CPT | Performed by: STUDENT IN AN ORGANIZED HEALTH CARE EDUCATION/TRAINING PROGRAM

## 2021-01-05 PROCEDURE — 96372 THER/PROPH/DIAG INJ SC/IM: CPT

## 2021-01-05 PROCEDURE — 93306 TTE W/DOPPLER COMPLETE: CPT

## 2021-01-05 PROCEDURE — 93970 EXTREMITY STUDY: CPT

## 2021-01-05 PROCEDURE — 80053 COMPREHEN METABOLIC PANEL: CPT

## 2021-01-05 PROCEDURE — 700102 HCHG RX REV CODE 250 W/ 637 OVERRIDE(OP): Performed by: INTERNAL MEDICINE

## 2021-01-05 PROCEDURE — 85025 COMPLETE CBC W/AUTO DIFF WBC: CPT

## 2021-01-05 PROCEDURE — 770020 HCHG ROOM/CARE - TELE (206)

## 2021-01-05 PROCEDURE — 93306 TTE W/DOPPLER COMPLETE: CPT | Mod: 26 | Performed by: INTERNAL MEDICINE

## 2021-01-05 RX ORDER — DIPHENHYDRAMINE HCL 25 MG
25 TABLET ORAL NIGHTLY PRN
Status: DISCONTINUED | OUTPATIENT
Start: 2021-01-05 | End: 2021-01-06 | Stop reason: HOSPADM

## 2021-01-05 RX ORDER — HYDRALAZINE HYDROCHLORIDE 25 MG/1
25 TABLET, FILM COATED ORAL EVERY 6 HOURS PRN
Status: DISCONTINUED | OUTPATIENT
Start: 2021-01-05 | End: 2021-01-06 | Stop reason: HOSPADM

## 2021-01-05 RX ORDER — LABETALOL HYDROCHLORIDE 5 MG/ML
10 INJECTION, SOLUTION INTRAVENOUS EVERY 6 HOURS PRN
Status: DISCONTINUED | OUTPATIENT
Start: 2021-01-05 | End: 2021-01-06 | Stop reason: HOSPADM

## 2021-01-05 RX ADMIN — ENOXAPARIN SODIUM 100 MG: 100 INJECTION SUBCUTANEOUS at 09:52

## 2021-01-05 RX ADMIN — ENOXAPARIN SODIUM 100 MG: 100 INJECTION SUBCUTANEOUS at 20:22

## 2021-01-05 RX ADMIN — ACETAMINOPHEN 650 MG: 325 TABLET, FILM COATED ORAL at 11:23

## 2021-01-05 RX ADMIN — CETIRIZINE HYDROCHLORIDE 10 MG: 10 TABLET, FILM COATED ORAL at 05:11

## 2021-01-05 RX ADMIN — ACETAMINOPHEN 650 MG: 325 TABLET, FILM COATED ORAL at 05:15

## 2021-01-05 RX ADMIN — OMEPRAZOLE 20 MG: 20 CAPSULE, DELAYED RELEASE ORAL at 05:11

## 2021-01-05 ASSESSMENT — ENCOUNTER SYMPTOMS
PND: 0
BRUISES/BLEEDS EASILY: 0
ABDOMINAL PAIN: 0
HEADACHES: 0
BLURRED VISION: 0
FOCAL WEAKNESS: 0
PALPITATIONS: 0
DEPRESSION: 0
FEVER: 0
CHILLS: 0
SHORTNESS OF BREATH: 1
HEMOPTYSIS: 0
NAUSEA: 0
MYALGIAS: 0
COUGH: 1
VOMITING: 0

## 2021-01-05 NOTE — H&P
Hospital Medicine History & Physical Note    Date of Service  1/4/2021    Primary Care Physician  Pcp Pt States None    Consultants  None    Code Status  Full Code    Chief Complaint  Chief Complaint   Patient presents with   • Chest Pain     chest pain and sob since friday  CT showed bilateral PE  sent to ER       History of Presenting Illness  50 y.o. male with past medical history of GERD, eosinophilic esophagitis, asthma, who presented 1/4/2021 with dry cough, shortness of breath on exertion and chest pain in the left side of the chest with deep inspiration and cough.  Patient was evaluated at urgent care and CT of pulmonary artery showed bilateral lower lobe segmental and subsegmental pulmonary emboli, small left pleural effusion, bibasilar atelectasis versus left lower lobe pulmonary infarct.  There is no evidence of RV strain.  Patient was sent to emergency department for further evaluation.  Patient is on room air, hemodynamically stable.  She received subcu Lovenox and will be admitted for telemetry and echo.    Review of Systems  Review of Systems   Constitutional: Negative for chills, fever and weight loss.   HENT: Negative for ear pain, hearing loss and tinnitus.    Eyes: Negative for blurred vision, double vision and photophobia.   Respiratory: Positive for cough and shortness of breath. Negative for hemoptysis and sputum production.    Cardiovascular: Positive for chest pain. Negative for palpitations and orthopnea.   Gastrointestinal: Negative for heartburn, nausea and vomiting.   Genitourinary: Negative for dysuria, flank pain, frequency and hematuria.   Musculoskeletal: Positive for joint pain. Negative for back pain and neck pain.   Skin: Negative for itching and rash.   Neurological: Negative for tremors, speech change, focal weakness and headaches.   Endo/Heme/Allergies: Negative for environmental allergies and polydipsia. Does not bruise/bleed easily.   Psychiatric/Behavioral: Negative for  hallucinations and substance abuse. The patient is not nervous/anxious.        Past Medical History   has a past medical history of Allergic rhinitis, Anesthesia, Arthritis, Asthma, Asthma, Eosinophilic esophagitis (2011), GERD (gastroesophageal reflux disease), and Indigestion.    Surgical History   has a past surgical history that includes egd with asp/bx (10/11); inguinal hernia repair (11/30/2011); and inguinal hernia laparoscopic (8/29/2012).     Family History  family history includes Cancer in his maternal grandmother, paternal grandfather, and paternal grandmother; Heart Disease in his mother; Lung Disease in his daughter; Stroke in his paternal uncle.     Social History   reports that he has never smoked. He has never used smokeless tobacco. He reports current alcohol use. He reports that he does not use drugs.    Allergies  Allergies   Allergen Reactions   • Nkda [No Known Drug Allergy]        Medications  Prior to Admission Medications   Prescriptions Last Dose Informant Patient Reported? Taking?   Mometasone Furo-Formoterol Fum (DULERA) 100-5 MCG/ACT Aerosol 1/4/2021 at AM  Yes Yes   Sig: Inhale 2 Puffs every day.   albuterol (VENTOLIN OR PROVENTIL) 108 (90 BASE) MCG/ACT Aero Soln inhalation aerosol 1/4/2021 at 1200  No No   Sig: Inhale 2 Puffs by mouth every four hours as needed for Shortness of Breath.   azithromycin (ZITHROMAX) 250 MG Tab Not Taking at Unknown time  No No   Sig: Take as directed   Patient not taking: Reported on 1/4/2021   cetirizine (ZYRTEC) 10 MG TABS 1/4/2021 at AM  Yes No   Sig: Take 10 mg by mouth every day.   cyclobenzaprine (FLEXERIL) 10 MG Tab Not Taking at Unknown time  No No   Sig: Take 1 Tab by mouth 3 times a day as needed.   Patient not taking: Reported on 1/4/2021   diclofenac EC (VOLTAREN) 75 MG Tablet Delayed Response Not Taking at Unknown time  No No   Sig: Take 1 Tab by mouth 2 times a day.   Patient not taking: Reported on 1/4/2021   doxycycline (VIBRAMYCIN) 100 MG  Tab Not Taking at Unknown time  No No   Sig: Take 1 Tab by mouth 2 times a day.   Patient not taking: Reported on 2021   hydrocodone-acetaminophen (NORCO) 5-325 MG TABS per tablet Not Taking at Unknown time  No No   Sig: Take 1-2 Tabs by mouth every 6 hours as needed (severe pain ).   Patient not taking: Reported on 2021   omeprazole (PRILOSEC) 20 MG delayed-release capsule 2021 at AM  Yes No   Sig: Take 20 mg by mouth every day.   predniSONE (DELTASONE) 20 MG Tab Not Taking at Unknown time  No No   Si tabs PO daily x 3 days then 1 daily x 2 days   Patient not taking: Reported on 2021      Facility-Administered Medications: None       Physical Exam  Temp:  [36.7 °C (98.1 °F)] 36.7 °C (98.1 °F)  Pulse:  [63-75] 72  Resp:  [16-19] 16  BP: (151-164)/() 158/98  SpO2:  [95 %-96 %] 96 %    Physical Exam  Vitals signs and nursing note reviewed.   Constitutional:       General: He is not in acute distress.     Appearance: Normal appearance.   HENT:      Head: Normocephalic and atraumatic.      Nose: Nose normal.      Mouth/Throat:      Mouth: Mucous membranes are moist.   Eyes:      Extraocular Movements: Extraocular movements intact.      Pupils: Pupils are equal, round, and reactive to light.   Neck:      Musculoskeletal: Normal range of motion and neck supple.   Cardiovascular:      Rate and Rhythm: Normal rate and regular rhythm.   Pulmonary:      Effort: Pulmonary effort is normal.      Breath sounds: Decreased breath sounds and rhonchi present.   Abdominal:      General: Abdomen is flat. There is no distension.      Tenderness: There is no abdominal tenderness.   Musculoskeletal: Normal range of motion.         General: No swelling or deformity.   Skin:     General: Skin is warm and dry.   Neurological:      General: No focal deficit present.      Mental Status: He is alert and oriented to person, place, and time.   Psychiatric:         Mood and Affect: Mood normal.         Behavior: Behavior  normal.         Laboratory:  Recent Labs     01/03/21  1632 01/04/21  1806   WBC 6.8 5.5   RBC 5.02 4.62*   HEMOGLOBIN 15.2 14.4   HEMATOCRIT 45.2 41.5*   MCV 90.0 89.8   MCH 30.3 31.2   MCHC 33.6* 34.7   RDW 38.9 38.6   PLATELETCT 190 172   MPV 10.7 10.0     Recent Labs     01/04/21  1806   SODIUM 142   POTASSIUM 3.9   CHLORIDE 107   CO2 26   GLUCOSE 93   BUN 10   CREATININE 1.01   CALCIUM 8.8     Recent Labs     01/04/21  1806   GLUCOSE 93         No results for input(s): NTPROBNP in the last 72 hours.      Recent Labs     01/04/21  1806   TROPONINT 24*       Imaging:  EC-ECHOCARDIOGRAM COMPLETE W/O CONT    (Results Pending)         Assessment/Plan:  I anticipate this patient is appropriate for observation status at this time.    Pulmonary embolism (HCC)  Assessment & Plan  CT of pulmonary artery showed bilateral lower lobe segmental and subsegmental pulmonary emboli, small left pleural effusion, bibasilar atelectasis versus left lower lobe pulmonary infarct.  There is no evidence of RV strain  Patient is on room air, hemodynamically stable.  She received subcu Lovenox and will be admitted for telemetry and echo.  We will plan for discharge on oral anticoagulation  He has no apparent risk factors for PE and no evidence of DVT on exam.  I recommended outpatient hypercoagulable state work-up    GERD (gastroesophageal reflux disease)- (present on admission)  Assessment & Plan  Stable  Continue omeprazole    Allergic asthma- (present on admission)  Assessment & Plan  No wheezes on exam heard  Continue Dulera, albuterol as needed

## 2021-01-05 NOTE — PROGRESS NOTES
Report received from [facility]. Pt arrived to unit via gurney.  Tele monitor applied, vitals taken. Pt assessed. A&Ox4. Admit profile and med rec completed. Discussed POC with pt, including monitoring, trending lab levels and pertinent tests. Welcome folder provided and discussed. Communication board filled out. Questions and concerns addressed - patient verbalized understanding. Fall precautions in place. Pt demonstrates ability to use call light appropriately. Pt left in lowest position. Bed locked and low. Bed alarm on.

## 2021-01-05 NOTE — ED NOTES
Medication reconciliation completed through interview with the patient and his wife. Reports no recent antibiotic use.    Humaira Nick, PharmD

## 2021-01-05 NOTE — ED PROVIDER NOTES
"ED Provider Note    Means of arrival: Private vehicle  History obtained from: Patient  History limited by: None    CHIEF COMPLAINT  Chief Complaint   Patient presents with   • Chest Pain     chest pain and sob since friday  CT showed bilateral PE  sent to ER       HPI  Prakash Kaur is a 50 y.o. male who presents to the Emergency Department for evaluation of chest pain and shortness of breath.  Patient reports that 3 days ago he started to have severe left-sided chest pain that was worse with deep inspiration.  He reports that the pain is sharp in nature.  He went to urgent care yesterday where he was evaluated with labs, labs were positive for D-dimer and outpatient CT was ordered.  CT returns today and per chart review was positive for, \"bilateral lower lobe segmental and subsegmental pulmonary emboli.  No CT evidence for right heart strain.\"  Therefore patient was advised to come to the emergency department for further management.  He has no history of PE.  No history of recent travel or surgery.  Recently had his COVID-19 vaccine.    REVIEW OF SYSTEMS  Review of Systems   Constitutional: Negative for chills and fever.   Respiratory: Positive for shortness of breath.    Cardiovascular: Positive for chest pain.   Gastrointestinal: Negative for abdominal pain, nausea and vomiting.   Genitourinary: Negative for dysuria.   Neurological: Negative for headaches.   All other systems reviewed and are negative.        PAST MEDICAL HISTORY   has a past medical history of Allergic rhinitis, Anesthesia, Arthritis, Asthma, Asthma, Eosinophilic esophagitis (2011), GERD (gastroesophageal reflux disease), and Indigestion.    SURGICAL HISTORY   has a past surgical history that includes egd with asp/bx (10/11); inguinal hernia repair (11/30/2011); and inguinal hernia laparoscopic (8/29/2012).    SOCIAL HISTORY  Social History     Tobacco Use   • Smoking status: Never Smoker   • Smokeless tobacco: Never Used   Substance Use " "Topics   • Alcohol use: Yes     Frequency: 4 or more times a week     Comment: 2 days per week   • Drug use: No      Social History     Substance and Sexual Activity   Drug Use No       FAMILY HISTORY  Family History   Problem Relation Age of Onset   • Stroke Paternal Uncle    • Cancer Maternal Grandmother    • Cancer Paternal Grandmother    • Cancer Paternal Grandfather    • Heart Disease Mother    • Lung Disease Daughter        CURRENT MEDICATIONS  Home Medications     Reviewed by Humaira Nick, PharmD (Pharmacist) on 01/04/21 at 1929  Med List Status: Complete   Medication Last Dose Status   albuterol (VENTOLIN OR PROVENTIL) 108 (90 BASE) MCG/ACT Aero Soln inhalation aerosol 1/4/2021 Active   azithromycin (ZITHROMAX) 250 MG Tab Not Taking Active   cetirizine (ZYRTEC) 10 MG TABS 1/4/2021 Active   cyclobenzaprine (FLEXERIL) 10 MG Tab Not Taking Active   diclofenac EC (VOLTAREN) 75 MG Tablet Delayed Response Not Taking Active   doxycycline (VIBRAMYCIN) 100 MG Tab Not Taking Active   hydrocodone-acetaminophen (NORCO) 5-325 MG TABS per tablet Not Taking Active   Mometasone Furo-Formoterol Fum (DULERA) 100-5 MCG/ACT Aerosol 1/4/2021 Active   omeprazole (PRILOSEC) 20 MG delayed-release capsule 1/4/2021 Active   predniSONE (DELTASONE) 20 MG Tab Not Taking Active                ALLERGIES  Allergies   Allergen Reactions   • Nkda [No Known Drug Allergy]        PHYSICAL EXAM  VITAL SIGNS: /85   Pulse 63   Temp 36.4 °C (97.6 °F) (Oral)   Resp 18   Ht 1.803 m (5' 11\")   Wt 101.7 kg (224 lb 3.3 oz)   SpO2 94%   BMI 31.27 kg/m²   Vitals reviewed by myself.  Physical Exam   Constitutional: He is oriented to person, place, and time and well-developed, well-nourished, and in no distress. No distress.   HENT:   Head: Normocephalic and atraumatic.   Cardiovascular: Normal rate and regular rhythm. Exam reveals no gallop and no friction rub.   No murmur heard.  Pulmonary/Chest: Effort normal and breath sounds normal. " No respiratory distress. He has no wheezes. He has no rales.   Musculoskeletal: Normal range of motion.   Neurological: He is alert and oriented to person, place, and time.   Skin: Skin is warm and dry.         DIAGNOSTIC STUDIES /  LABS  Labs Reviewed   CBC WITH DIFFERENTIAL - Abnormal; Notable for the following components:       Result Value    RBC 4.62 (*)     Hematocrit 41.5 (*)     All other components within normal limits   TROPONIN - Abnormal; Notable for the following components:    Troponin T 24 (*)     All other components within normal limits   BASIC METABOLIC PANEL   ESTIMATED GFR   CBC WITH DIFFERENTIAL   COMP METABOLIC PANEL       All labs reviewed by me.    EKG Interpretation:  Interpreted by myself    12 Lead EKG interpreted by me to show:  EKG at 1725: Normal sinus rhythm, heart rate 70, normal axis, normal intervals, , QRS 89, QTc 422, no acute ST-T segment changes, no evidence of acute arrhythmia or ischemia  My impression of this EKG: Does not indicate ischemia or arrhythmia at this time.    RADIOLOGY  Reviewed CT from earlier today  The radiologist's interpretation of all radiological studies have been reviewed by me.          COURSE & MEDICAL DECISION MAKING  Nursing notes, VS, PMSFHx reviewed in chart.    Patient is a 50-year-old male who comes in for evaluation of chest pain and shortness of breath.  Outpatient CT reviewed and demonstrated bilateral pulmonary emboli.  I will check patient's renal function and start him on anticoagulation.  EKG is done and demonstrates no evidence of acute right heart strain or ischemia.  Labs returned and are unremarkable except for mildly elevated troponin of 24, renal function is within normal limits therefore patient is started on weight-based Lovenox.  He is subsequently hospitalized under the care of Dr. Elizondo in stable condition.      FINAL IMPRESSION  1. Acute pulmonary embolism, unspecified pulmonary embolism type, unspecified whether acute  cor pulmonale present (HCC)    2. Chest pain, unspecified type    3. SOB (shortness of breath)

## 2021-01-05 NOTE — ASSESSMENT & PLAN NOTE
Unprovoked. Denies long distance driving, no family history, denies recent decreased mobility level   CT of pulmonary artery showed bilateral lower lobe segmental and subsegmental pulmonary emboli, small left pleural effusion, bibasilar atelectasis versus left lower lobe pulmonary infarct.  There is no evidence of RV strain  Trop neg  TTE: EF 55%, Right ventricular systolic pressure is estimated to be 25 mmHg  DVT ruled out by doppler  On Lovenox therapeutic dose, will switch to NOACs am  Outpatient hypercoagulable work-up recommended

## 2021-01-05 NOTE — ED NOTES
Pt resting on gurney, pt in no acute distress, pt provided call light, instructed to call if needing any assistance, instructed not to get up by self, charlie in lowest position.

## 2021-01-05 NOTE — PROGRESS NOTES
0800 Pt assessed, no c/o at this time.    0958 Pt medicated as per MAR    This RN addressed BP via voalte with MD Chacon.

## 2021-01-06 ENCOUNTER — APPOINTMENT (OUTPATIENT)
Dept: RADIOLOGY | Facility: MEDICAL CENTER | Age: 51
DRG: 176 | End: 2021-01-06
Attending: STUDENT IN AN ORGANIZED HEALTH CARE EDUCATION/TRAINING PROGRAM
Payer: COMMERCIAL

## 2021-01-06 VITALS
DIASTOLIC BLOOD PRESSURE: 86 MMHG | HEIGHT: 71 IN | BODY MASS INDEX: 31.39 KG/M2 | HEART RATE: 74 BPM | OXYGEN SATURATION: 92 % | WEIGHT: 224.21 LBS | TEMPERATURE: 97.3 F | SYSTOLIC BLOOD PRESSURE: 141 MMHG | RESPIRATION RATE: 18 BRPM

## 2021-01-06 LAB
ALBUMIN SERPL BCP-MCNC: 3.5 G/DL (ref 3.2–4.9)
ALBUMIN/GLOB SERPL: 1.2 G/DL
ALP SERPL-CCNC: 139 U/L (ref 30–99)
ALT SERPL-CCNC: 135 U/L (ref 2–50)
ANION GAP SERPL CALC-SCNC: 11 MMOL/L (ref 7–16)
AST SERPL-CCNC: 86 U/L (ref 12–45)
BASOPHILS # BLD AUTO: 0.8 % (ref 0–1.8)
BASOPHILS # BLD: 0.04 K/UL (ref 0–0.12)
BILIRUB SERPL-MCNC: 0.6 MG/DL (ref 0.1–1.5)
BUN SERPL-MCNC: 13 MG/DL (ref 8–22)
CALCIUM SERPL-MCNC: 8.8 MG/DL (ref 8.4–10.2)
CHLORIDE SERPL-SCNC: 107 MMOL/L (ref 96–112)
CO2 SERPL-SCNC: 23 MMOL/L (ref 20–33)
CREAT SERPL-MCNC: 1.01 MG/DL (ref 0.5–1.4)
EOSINOPHIL # BLD AUTO: 0.17 K/UL (ref 0–0.51)
EOSINOPHIL NFR BLD: 3.5 % (ref 0–6.9)
ERYTHROCYTE [DISTWIDTH] IN BLOOD BY AUTOMATED COUNT: 37.4 FL (ref 35.9–50)
GLOBULIN SER CALC-MCNC: 2.9 G/DL (ref 1.9–3.5)
GLUCOSE SERPL-MCNC: 93 MG/DL (ref 65–99)
HAV IGM SERPL QL IA: NORMAL
HBV CORE IGM SER QL: NORMAL
HBV SURFACE AG SER QL: NORMAL
HCT VFR BLD AUTO: 42.3 % (ref 42–52)
HCV AB SER QL: NORMAL
HGB BLD-MCNC: 14.8 G/DL (ref 14–18)
IMM GRANULOCYTES # BLD AUTO: 0.02 K/UL (ref 0–0.11)
IMM GRANULOCYTES NFR BLD AUTO: 0.4 % (ref 0–0.9)
LYMPHOCYTES # BLD AUTO: 1.87 K/UL (ref 1–4.8)
LYMPHOCYTES NFR BLD: 38.4 % (ref 22–41)
MCH RBC QN AUTO: 31 PG (ref 27–33)
MCHC RBC AUTO-ENTMCNC: 35 G/DL (ref 33.7–35.3)
MCV RBC AUTO: 88.5 FL (ref 81.4–97.8)
MONOCYTES # BLD AUTO: 0.31 K/UL (ref 0–0.85)
MONOCYTES NFR BLD AUTO: 6.4 % (ref 0–13.4)
NEUTROPHILS # BLD AUTO: 2.46 K/UL (ref 1.82–7.42)
NEUTROPHILS NFR BLD: 50.5 % (ref 44–72)
NRBC # BLD AUTO: 0 K/UL
NRBC BLD-RTO: 0 /100 WBC
PLATELET # BLD AUTO: 192 K/UL (ref 164–446)
PMV BLD AUTO: 10.3 FL (ref 9–12.9)
POTASSIUM SERPL-SCNC: 4.2 MMOL/L (ref 3.6–5.5)
PROT SERPL-MCNC: 6.4 G/DL (ref 6–8.2)
RBC # BLD AUTO: 4.78 M/UL (ref 4.7–6.1)
SODIUM SERPL-SCNC: 141 MMOL/L (ref 135–145)
WBC # BLD AUTO: 4.9 K/UL (ref 4.8–10.8)

## 2021-01-06 PROCEDURE — 80053 COMPREHEN METABOLIC PANEL: CPT

## 2021-01-06 PROCEDURE — 80074 ACUTE HEPATITIS PANEL: CPT

## 2021-01-06 PROCEDURE — 700102 HCHG RX REV CODE 250 W/ 637 OVERRIDE(OP): Performed by: STUDENT IN AN ORGANIZED HEALTH CARE EDUCATION/TRAINING PROGRAM

## 2021-01-06 PROCEDURE — A9270 NON-COVERED ITEM OR SERVICE: HCPCS | Performed by: INTERNAL MEDICINE

## 2021-01-06 PROCEDURE — 85025 COMPLETE CBC W/AUTO DIFF WBC: CPT

## 2021-01-06 PROCEDURE — 700111 HCHG RX REV CODE 636 W/ 250 OVERRIDE (IP): Performed by: STUDENT IN AN ORGANIZED HEALTH CARE EDUCATION/TRAINING PROGRAM

## 2021-01-06 PROCEDURE — 700111 HCHG RX REV CODE 636 W/ 250 OVERRIDE (IP): Performed by: INTERNAL MEDICINE

## 2021-01-06 PROCEDURE — 700102 HCHG RX REV CODE 250 W/ 637 OVERRIDE(OP): Performed by: INTERNAL MEDICINE

## 2021-01-06 PROCEDURE — 70450 CT HEAD/BRAIN W/O DYE: CPT

## 2021-01-06 PROCEDURE — 99239 HOSP IP/OBS DSCHRG MGMT >30: CPT | Performed by: STUDENT IN AN ORGANIZED HEALTH CARE EDUCATION/TRAINING PROGRAM

## 2021-01-06 PROCEDURE — A9270 NON-COVERED ITEM OR SERVICE: HCPCS | Performed by: STUDENT IN AN ORGANIZED HEALTH CARE EDUCATION/TRAINING PROGRAM

## 2021-01-06 RX ORDER — BUTALBITAL, ACETAMINOPHEN AND CAFFEINE 50; 325; 40 MG/1; MG/1; MG/1
1 TABLET ORAL
Status: COMPLETED | OUTPATIENT
Start: 2021-01-06 | End: 2021-01-06

## 2021-01-06 RX ORDER — AMLODIPINE BESYLATE 5 MG/1
5 TABLET ORAL
Status: DISCONTINUED | OUTPATIENT
Start: 2021-01-06 | End: 2021-01-06 | Stop reason: HOSPADM

## 2021-01-06 RX ORDER — AMLODIPINE BESYLATE 5 MG/1
5 TABLET ORAL DAILY
Qty: 30 TAB | Refills: 0 | Status: SHIPPED | OUTPATIENT
Start: 2021-01-07 | End: 2021-02-03 | Stop reason: SDUPTHER

## 2021-01-06 RX ORDER — ENALAPRILAT 1.25 MG/ML
1.25 INJECTION INTRAVENOUS EVERY 6 HOURS PRN
Status: DISCONTINUED | OUTPATIENT
Start: 2021-01-06 | End: 2021-01-06 | Stop reason: HOSPADM

## 2021-01-06 RX ADMIN — BUDESONIDE AND FORMOTEROL FUMARATE DIHYDRATE 2 PUFF: 80; 4.5 AEROSOL RESPIRATORY (INHALATION) at 09:33

## 2021-01-06 RX ADMIN — APIXABAN 10 MG: 5 TABLET, FILM COATED ORAL at 09:39

## 2021-01-06 RX ADMIN — ACETAMINOPHEN 650 MG: 325 TABLET, FILM COATED ORAL at 07:45

## 2021-01-06 RX ADMIN — ONDANSETRON 4 MG: 2 INJECTION INTRAMUSCULAR; INTRAVENOUS at 09:33

## 2021-01-06 RX ADMIN — PROMETHAZINE HYDROCHLORIDE 25 MG: 25 TABLET ORAL at 12:10

## 2021-01-06 RX ADMIN — OXYCODONE HYDROCHLORIDE 10 MG: 10 TABLET ORAL at 09:32

## 2021-01-06 RX ADMIN — APIXABAN 10 MG: 5 TABLET, FILM COATED ORAL at 17:07

## 2021-01-06 RX ADMIN — ENALAPRILAT 1.25 MG: 1.25 INJECTION INTRAVENOUS at 12:42

## 2021-01-06 RX ADMIN — BUTALBITAL, ACETAMINOPHEN, AND CAFFEINE 1 TABLET: 50; 325; 40 TABLET ORAL at 12:41

## 2021-01-06 RX ADMIN — AMLODIPINE BESYLATE 5 MG: 5 TABLET ORAL at 09:32

## 2021-01-06 NOTE — ASSESSMENT & PLAN NOTE
Denies abdominal pain  ? Hepatitis vs. DEMARCO vs. Hepatic steatosis  hepatitis panel ordered  Cont monitoring

## 2021-01-06 NOTE — PROGRESS NOTES
Hospital Medicine Daily Progress Note    Date of Service  1/5/2021    Chief Complaint  50 y.o. male admitted 1/4/2021 with   Chief Complaint   Patient presents with   • Chest Pain     chest pain and sob since friday  CT showed bilateral PE  sent to ER         Hospital Course  50 y.o. male with past medical history of GERD, eosinophilic esophagitis, asthma, who presented 1/4/2021 with dry cough, shortness of breath on exertion and chest pain in the left side of the chest with deep inspiration and cough.  Patient was evaluated at urgent care and CT of pulmonary artery showed bilateral lower lobe segmental and subsegmental pulmonary emboli, small left pleural effusion, bibasilar atelectasis versus left lower lobe pulmonary infarct.  There is no evidence of RV strain.  He received subcu Lovenox and will be admitted for telemetry and echo.  TTE: Left ventricular ejection fraction is visually estimated to be 55%.  Right ventricular systolic pressure is estimated to be 25 mmHg.  No DVT by doppler    Interval Problem Update  Patient was seen and examined at the bedside.   VS reviewed.   Patient reports chest pain improving, no swelling of BLE, no tenderness of BLE.  No past medical history or family history of DVT/PE    Consultants/Specialty  None    Code Status  Full Code    Disposition  Likely home    Review of Systems  Review of Systems   Constitutional: Negative for chills and fever.   HENT: Negative for ear discharge.    Eyes: Negative for blurred vision.   Respiratory: Positive for cough and shortness of breath. Negative for hemoptysis.    Cardiovascular: Positive for chest pain. Negative for palpitations, leg swelling and PND.   Gastrointestinal: Negative for nausea and vomiting.   Genitourinary: Negative for dysuria.   Musculoskeletal: Negative for myalgias.   Skin: Negative for rash.   Neurological: Negative for focal weakness.   Endo/Heme/Allergies: Does not bruise/bleed easily.   Psychiatric/Behavioral: Negative  for depression.        Physical Exam  Temp:  [36.3 °C (97.3 °F)-36.7 °C (98.1 °F)] 36.5 °C (97.7 °F)  Pulse:  [61-75] 73  Resp:  [16-19] 18  BP: (151-171)/() 171/92  SpO2:  [92 %-97 %] 96 %    Physical Exam  Vitals signs and nursing note reviewed.   Constitutional:       Appearance: Normal appearance.   HENT:      Head: Normocephalic and atraumatic.      Mouth/Throat:      Pharynx: Oropharynx is clear.   Eyes:      Pupils: Pupils are equal, round, and reactive to light.   Cardiovascular:      Rate and Rhythm: Normal rate and regular rhythm.   Pulmonary:      Effort: Pulmonary effort is normal. No respiratory distress.      Breath sounds: Normal breath sounds. No wheezing.   Abdominal:      General: Abdomen is flat. Bowel sounds are normal.      Palpations: Abdomen is soft.   Musculoskeletal: Normal range of motion.   Skin:     General: Skin is warm and dry.   Neurological:      General: No focal deficit present.      Mental Status: He is alert and oriented to person, place, and time.   Psychiatric:         Mood and Affect: Mood normal.         Behavior: Behavior normal.         Fluids  No intake or output data in the 24 hours ending 01/05/21 1655    Laboratory  Recent Labs     01/03/21  1632 01/04/21  1806 01/05/21  0317   WBC 6.8 5.5 5.0   RBC 5.02 4.62* 4.53*   HEMOGLOBIN 15.2 14.4 13.9*   HEMATOCRIT 45.2 41.5* 40.7*   MCV 90.0 89.8 89.8   MCH 30.3 31.2 30.7   MCHC 33.6* 34.7 34.2   RDW 38.9 38.6 38.6   PLATELETCT 190 172 185   MPV 10.7 10.0 10.7     Recent Labs     01/04/21  1806 01/05/21  0317   SODIUM 142 146*   POTASSIUM 3.9 4.2   CHLORIDE 107 110   CO2 26 26   GLUCOSE 93 79   BUN 10 11   CREATININE 1.01 1.07   CALCIUM 8.8 8.7                   Imaging  EC-ECHOCARDIOGRAM COMPLETE W/O CONT   Final Result      US-EXTREMITY VENOUS LOWER BILAT   Final Result           Assessment/Plan  * Pulmonary embolism (HCC)  Assessment & Plan  Unprovoked. Denies long distance driving, no family history, denies recent  decreased mobility level   CT of pulmonary artery showed bilateral lower lobe segmental and subsegmental pulmonary emboli, small left pleural effusion, bibasilar atelectasis versus left lower lobe pulmonary infarct.  There is no evidence of RV strain  Trop neg  TTE: EF 55%, Right ventricular systolic pressure is estimated to be 25 mmHg  DVT ruled out by doppler  On Lovenox therapeutic dose, will switch to NOACs am  Outpatient hypercoagulable work-up recommended    Elevated liver enzymes  Assessment & Plan  Denies abdominal pain  ? Hepatitis vs. DEMARCO vs. Hepatic steatosis  hepatitis panel ordered  Cont monitoring    GERD (gastroesophageal reflux disease)- (present on admission)  Assessment & Plan  Stable  Continue omeprazole    Allergic asthma- (present on admission)  Assessment & Plan  No wheezes on exam heard  Continue Dulera, albuterol as needed       VTE prophylaxis: lovenox

## 2021-01-06 NOTE — PROGRESS NOTES
Received report from TASHA Montaño. Safety precautions in place. Bed locked and low. Offered assist. Call light and belongings within reach.

## 2021-01-06 NOTE — DISCHARGE SUMMARY
Discharge Summary    CHIEF COMPLAINT ON ADMISSION  Chief Complaint   Patient presents with   • Chest Pain     chest pain and sob since friday  CT showed bilateral PE  sent to ER       Reason for Admission  shortness of breath, chest pressur*     Admission Date  1/4/2021    CODE STATUS  Full Code    HPI & HOSPITAL COURSE  50 y.o. male with past medical history of GERD, eosinophilic esophagitis, asthma, who presented 1/4/2021 with dry cough, shortness of breath on exertion and chest pain in the left side of the chest with deep inspiration and cough.  Patient was evaluated at urgent care and CT of pulmonary artery showed bilateral lower lobe segmental and subsegmental pulmonary emboli, small left pleural effusion, bibasilar atelectasis versus left lower lobe pulmonary infarct.  There is no evidence of RV strain.  He received subcu Lovenox and was admitted for telemetry and echo.   TTE: Left ventricular ejection fraction is visually estimated to be 55%.  Right ventricular systolic pressure is estimated to be 25 mmHg.  No DVT by doppler. Trop negative. On the discharge day, he is prescribed with Eliquis 10 mg twice daily for 1 week and then 5 mg twice daily.  He is advised to follow-up with hematology to discuss the duration of Eliquis and hypercoagulable work-up.  Eliquis has been checked with , which is covered by his insurance    Patient had a severe headache in the morning of discharge, associated with nauseous and vomiting.  Head CT stat showed no acute abnormalities.     He Is also noted to have elevated liver enzyme.  Hepatitis panel negative.  He admits he drinks alcohol daily.  Alcohol cessation advised.    Patient noted to have hypertension.  Amlodipine 5 mg daily prescribed.  He is advised to take low-salt diet, check blood pressure daily and follow-up with PCP.     Therefore, he is discharged in fair and stable condition to home with close outpatient follow-up.    The patient met 2-midnight  criteria for an inpatient stay at the time of discharge.    Discharge Date  1/6/2021    FOLLOW UP ITEMS POST DISCHARGE  PCP to repeat liver function in 1 months, follow-up hypertension  Hematology for PE    DISCHARGE DIAGNOSES  Principal Problem:    Pulmonary embolism (HCC) POA: Unknown  Active Problems:    Allergic asthma POA: Yes    GERD (gastroesophageal reflux disease) POA: Yes    Elevated liver enzymes POA: Unknown  Resolved Problems:    * No resolved hospital problems. *      FOLLOW UP  No future appointments.  pcp    In 1 week      hematology    In 2 weeks        MEDICATIONS ON DISCHARGE     Medication List      START taking these medications      Instructions   amLODIPine 5 MG Tabs  Start taking on: January 7, 2021  Commonly known as: NORVASC   Take 1 Tab by mouth every day for 30 days.  Dose: 5 mg     apixaban 5mg Tabs  Commonly known as: ELIQUIS   Doctor's comments: Take 10mg (2 tabs) twice a day for 7 days; then take 5mg (1tab) twice a day for the rest  Take 1 Tab by mouth 2 Times a Day for 30 days. Indications: DVT/PE  Dose: 5 mg        CONTINUE taking these medications      Instructions   albuterol 108 (90 Base) MCG/ACT Aers inhalation aerosol   Inhale 2 Puffs by mouth every four hours as needed for Shortness of Breath.  Dose: 2 Puff     cetirizine 10 MG Tabs  Commonly known as: ZYRTEC   Take 10 mg by mouth every day.  Dose: 10 mg     Dulera 100-5 MCG/ACT Aero  Generic drug: Mometasone Furo-Formoterol Fum   Inhale 2 Puffs every day.  Dose: 2 Puff     omeprazole 20 MG delayed-release capsule  Commonly known as: PRILOSEC   Take 20 mg by mouth every day.  Dose: 20 mg        STOP taking these medications    azithromycin 250 MG Tabs  Commonly known as: ZITHROMAX     cyclobenzaprine 10 mg Tabs  Commonly known as: Flexeril     diclofenac DR 75 MG Tbec  Commonly known as: Voltaren     doxycycline 100 MG Tabs  Commonly known as: VIBRAMYCIN     HYDROcodone-acetaminophen 5-325 MG Tabs per tablet  Commonly known  as: Norco     predniSONE 20 MG Tabs  Commonly known as: DELTASONE            Allergies  Allergies   Allergen Reactions   • Nkda [No Known Drug Allergy]        DIET  Orders Placed This Encounter   Procedures   • Diet Order Diet: Regular     Standing Status:   Standing     Number of Occurrences:   1     Order Specific Question:   Diet:     Answer:   Regular [1]       ACTIVITY  As tolerated.  Weight bearing as tolerated    CONSULTATIONS  none    PROCEDURES  none    LABORATORY  Lab Results   Component Value Date    SODIUM 141 01/06/2021    POTASSIUM 4.2 01/06/2021    CHLORIDE 107 01/06/2021    CO2 23 01/06/2021    GLUCOSE 93 01/06/2021    BUN 13 01/06/2021    CREATININE 1.01 01/06/2021    CREATININE 1.2 09/26/2005        Lab Results   Component Value Date    WBC 4.9 01/06/2021    HEMOGLOBIN 14.8 01/06/2021    HEMATOCRIT 42.3 01/06/2021    PLATELETCT 192 01/06/2021        Total time of the discharge process exceeds 40 minutes.

## 2021-01-06 NOTE — PROGRESS NOTES
Telemetry Shift Summary     Rhythm SR  HR Range 70s  Ectopy none  Measurements 0.20/0.08/0.40        Normal Values  Rhythm SR  HR Range    Measurements 0.12-0.20 / 0.06-0.10  / 0.30-0.52

## 2021-01-07 ENCOUNTER — NURSE TRIAGE (OUTPATIENT)
Dept: HEALTH INFORMATION MANAGEMENT | Facility: OTHER | Age: 51
End: 2021-01-07

## 2021-01-07 ENCOUNTER — TELEPHONE (OUTPATIENT)
Dept: SCHEDULING | Facility: IMAGING CENTER | Age: 51
End: 2021-01-07

## 2021-01-07 NOTE — TELEPHONE ENCOUNTER
Additional Information  • Negative: SEVERE difficulty breathing (e.g., struggling for each breath, speaks in single words)  • Negative: Difficult to awaken or acting confused (e.g., disoriented, slurred speech)  • Negative: Bluish (or gray) lips or face now  • Negative: Shock suspected (e.g., cold/pale/clammy skin, too weak to stand, low BP, rapid pulse)  • Negative: Sounds like a life-threatening emergency to the triager  • Negative: [1] COVID-19 suspected (e.g., cough, fever, shortness of breath) AND [2] public health department recommends testing  • Negative: [1] COVID-19 exposure AND [2] no symptoms  • Negative: COVID-19 and Breastfeeding, questions about  • Negative: SEVERE or constant chest pain (Exception: mild central chest pain, present only when coughing)  • Negative: MODERATE difficulty breathing (e.g., speaks in phrases, SOB even at rest, pulse 100-120)  • Negative: MILD difficulty breathing (e.g., minimal/no SOB at rest, SOB with walking, pulse <100)  • Negative: Chest pain  • Negative: Patient sounds very sick or weak to the triager  • Negative: Fever > 103 F (39.4 C)  • Negative: [1] Fever > 101 F (38.3 C) AND [2] age > 60  • Negative: [1] Fever > 100.0 F (37.8 C) AND [2] bedridden (e.g., nursing home patient, CVA, chronic illness, recovering from surgery)  • Negative: HIGH RISK patient (e.g., age > 64 years, diabetes, heart or lung disease, weak immune system)  • Negative: Fever present > 3 days (72 hours)  • Negative: [1] Fever returns after gone for over 24 hours AND [2] symptoms worse or not improved  • Negative: [1] Continuous (nonstop) coughing interferes with work or school AND [2] no improvement using cough treatment per protocol  • Negative: Cough present > 3 weeks  • Negative: [1] COVID-19 infection diagnosed or suspected AND [2] mild symptoms (fever, cough) AND [3] no trouble breathing or other complications  • Negative: COVID-19, questions about  • Negative: COVID-19 Home Isolation,  "questions about  • Negative: COVID-19 Prevention and Healthy Living, questions about  • Negative: COVID-19 Testing, questions about    Answer Assessment - Initial Assessment Questions  1. COVID-19 DIAGNOSIS: \"Who made your Coronavirus (COVID-19) diagnosis?\" \"Was it confirmed by a positive lab test?\" If not diagnosed by a HCP, ask \"Are there lots of cases (community spread) where you live?\" (See public health department website, if unsure)    * MAJOR community spread: high number of cases; numbers of cases are increasing; many people hospitalized.    * MINOR community spread: low number of cases; not increasing; few or no people hospitalized      Major   2. ONSET: \"When did the COVID-19 symptoms start?\"       12/31/2020  3. WORST SYMPTOM: \"What is your worst symptom?\" (e.g., cough, fever, shortness of breath, muscle aches)   Cough and shortness of breath due to bilateral PE's  4. COUGH: \"How bad is the cough?\"       Improving since treated for bilateral PE's   5. FEVER: \"Do you have a fever?\" If so, ask: \"What is your temperature, how was it measured, and when did it start?\"    No fever  6. RESPIRATORY STATUS: \"Describe your breathing?\" (e.g., shortness of breath, wheezing, unable to speak)   SOB has resolved  7. BETTER-SAME-WORSE: \"Are you getting better, staying the same or getting worse compared to yesterday?\"  If getting worse, ask, \"In what way?\"    better  8. HIGH RISK DISEASE: \"Do you have any chronic medical problems?\" (e.g., asthma, heart or lung disease, weak immune system, etc.)    Asthma  9. PREGNANCY: \"Is there any chance you are pregnant?\" \"When was your last menstrual period?\"     no  10. OTHER SYMPTOMS: \"Do you have any other symptoms?\"  (e.g., runny nose, headache, sore throat, loss of smell)      None    Protocols used: CORONAVIRUS (COVID-19) DIAGNOSED OR [id number]    "

## 2021-01-07 NOTE — DISCHARGE INSTRUCTIONS
Discharge Instructions    Discharged to home by car with relative. Discharged via walking, hospital escort: Yes.  Special equipment needed: Not Applicable    Be sure to schedule a follow-up appointment with your primary care doctor or any specialists as instructed.     Discharge Plan:   Activity Level: Discussed  Confirmed Follow up Appointment: Appointment Scheduled  Confirmed Symptoms Management: Discussed  Influenza Vaccine Indication: Patient Refuses    I understand that a diet low in cholesterol, fat, and sodium is recommended for good health. Unless I have been given specific instructions below for another diet, I accept this instruction as my diet prescription.   Other diet: low salt diet              Depression / Suicide Risk    As you are discharged from this [facility] facility, it is important to learn how to keep safe from harming yourself.    Recognize the warning signs:  · Abrupt changes in personality, positive or negative- including increase in energy   · Giving away possessions  · Change in eating patterns- significant weight changes-  positive or negative  · Change in sleeping patterns- unable to sleep or sleeping all the time   · Unwillingness or inability to communicate  · Depression  · Unusual sadness, discouragement and loneliness  · Talk of wanting to die  · Neglect of personal appearance   · Rebelliousness- reckless behavior  · Withdrawal from people/activities they love  · Confusion- inability to concentrate     If you or a loved one observes any of these behaviors or has concerns about self-harm, here's what you can do:  · Talk about it- your feelings and reasons for harming yourself  · Remove any means that you might use to hurt yourself (examples: pills, rope, extension cords, firearm)  · Get professional help from the community (Mental Health, Substance Abuse, psychological counseling)  · Do not be alone:Call your Safe Contact- someone whom you trust who will be there for you.  · Call  your local CRISIS HOTLINE 435-8695 or 534-267-9868  · Call your local Children's Mobile Crisis Response Team [place] (318) 664-7281 or www.[employer]  · Call the toll free National Suicide Prevention Hotlines   · National Suicide Prevention Lifeline 786-392-TQRW (7865)  · [employer] Line Network 800-SUICIDE (502-1188)    Hypertension, Adult  Hypertension is another name for high blood pressure. High blood pressure forces your heart to work harder to pump blood. This can cause problems over time.  There are two numbers in a blood pressure reading. There is a top number (systolic) over a bottom number (diastolic). It is best to have a blood pressure that is below 120/80. Healthy choices can help lower your blood pressure, or you may need medicine to help lower it.  What are the causes?  The cause of this condition is not known. Some conditions may be related to high blood pressure.  What increases the risk?  · Smoking.  · Having type 2 diabetes mellitus, high cholesterol, or both.  · Not getting enough exercise or physical activity.  · Being overweight.  · Having too much fat, sugar, calories, or salt (sodium) in your diet.  · Drinking too much alcohol.  · Having long-term (chronic) kidney disease.  · Having a family history of high blood pressure.  · Age. Risk increases with age.  · Race. You may be at higher risk if you are .  · Gender. Men are at higher risk than women before age 45. After age 65, women are at higher risk than men.  · Having obstructive sleep apnea.  · Stress.  What are the signs or symptoms?  · High blood pressure may not cause symptoms. Very high blood pressure (hypertensive crisis) may cause:  ? Headache.  ? Feelings of worry or nervousness (anxiety).  ? Shortness of breath.  ? Nosebleed.  ? A feeling of being sick to your stomach (nausea).  ? Throwing up (vomiting).  ? Changes in how you see.  ? Very bad chest pain.  ? Seizures.  How is this treated?  · This  condition is treated by making healthy lifestyle changes, such as:  ? Eating healthy foods.  ? Exercising more.  ? Drinking less alcohol.  · Your health care provider may prescribe medicine if lifestyle changes are not enough to get your blood pressure under control, and if:  ? Your top number is above 130.  ? Your bottom number is above 80.  · Your personal target blood pressure may vary.  Follow these instructions at home:  Eating and drinking    · If told, follow the DASH eating plan. To follow this plan:  ? Fill one half of your plate at each meal with fruits and vegetables.  ? Fill one fourth of your plate at each meal with whole grains. Whole grains include whole-wheat pasta, brown rice, and whole-grain bread.  ? Eat or drink low-fat dairy products, such as skim milk or low-fat yogurt.  ? Fill one fourth of your plate at each meal with low-fat (lean) proteins. Low-fat proteins include fish, chicken without skin, eggs, beans, and tofu.  ? Avoid fatty meat, cured and processed meat, or chicken with skin.  ? Avoid pre-made or processed food.  · Eat less than 1,500 mg of salt each day.  · Do not drink alcohol if:  ? Your doctor tells you not to drink.  ? You are pregnant, may be pregnant, or are planning to become pregnant.  · If you drink alcohol:  ? Limit how much you use to:  § 0-1 drink a day for women.  § 0-2 drinks a day for men.  ? Be aware of how much alcohol is in your drink. In the [place] one drink equals one 12 oz bottle of beer (355 mL), one 5 oz glass of wine (148 mL), or one 1½ oz glass of hard liquor (44 mL).  Lifestyle    · Work with your doctor to stay at a healthy weight or to lose weight. Ask your doctor what the best weight is for you.  · Get at least 30 minutes of exercise most days of the week. This may include walking, swimming, or biking.  · Get at least 30 minutes of exercise that strengthens your muscles (resistance exercise) at least 3 days a week. This may include lifting weights or  doing Pilates.  · Do not use any products that contain nicotine or tobacco, such as cigarettes, e-cigarettes, and chewing tobacco. If you need help quitting, ask your doctor.  · Check your blood pressure at home as told by your doctor.  · Keep all follow-up visits as told by your doctor. This is important.  Medicines  · Take over-the-counter and prescription medicines only as told by your doctor. Follow directions carefully.  · Do not skip doses of blood pressure medicine. The medicine does not work as well if you skip doses. Skipping doses also puts you at risk for problems.  · Ask your doctor about side effects or reactions to medicines that you should watch for.  Contact a doctor if you:  · Think you are having a reaction to the medicine you are taking.  · Have headaches that keep coming back (recurring).  · Feel dizzy.  · Have swelling in your ankles.  · Have trouble with your vision.  Get help right away if you:  · Get a very bad headache.  · Start to feel mixed up (confused).  · Feel weak or numb.  · Feel faint.  · Have very bad pain in your:  ? Chest.  ? Belly (abdomen).  · Throw up more than once.  · Have trouble breathing.  Summary  · Hypertension is another name for high blood pressure.  · High blood pressure forces your heart to work harder to pump blood.  · For most people, a normal blood pressure is less than 120/80.  · Making healthy choices can help lower blood pressure. If your blood pressure does not get lower with healthy choices, you may need to take medicine.  This information is not intended to replace advice given to you by your health care provider. Make sure you discuss any questions you have with your health care provider.  Document Released: 06/05/2009 Document Revised: 08/28/2019 Document Reviewed: 08/28/2019  [employer] Patient Education © 2020 [employer]  Pulmonary Embolism    A pulmonary embolism (PE) is a sudden blockage or decrease of blood flow in one or both lungs. Most blockages  come from a blood clot that forms in the vein of a lower leg, thigh, or arm (deep vein thrombosis, DVT) and travels to the lungs. A clot is blood that has thickened into a gel or solid. PE is a dangerous and life-threatening condition that needs to be treated right away.  What are the causes?  This condition is usually caused by a blood clot that forms in a vein and moves to the lungs. In rare cases, it may be caused by air, fat, part of a tumor, or other tissue that moves through the veins and into the lungs.  What increases the risk?  The following factors may make you more likely to develop this condition:  · Experiencing a traumatic injury, such as breaking a hip or leg.  · Having:  ? A spinal cord injury.  ? Orthopedic surgery, especially hip or knee replacement.  ? Any major surgery.  ? A stroke.  ? DVT.  ? Blood clots or blood clotting disease.  ? Long-term (chronic) lung or heart disease.  ? Cancer treated with chemotherapy.  ? A central venous catheter.  · Taking medicines that contain estrogen. These include birth control pills and hormone replacement therapy.  · Being:  ? Pregnant.  ? In the period of time after your baby is delivered (postpartum).  ? Older than age 60.  ? Overweight.  ? A smoker, especially if you have other risks.  What are the signs or symptoms?  Symptoms of this condition usually start suddenly and include:  · Shortness of breath during activity or at rest.  · Coughing, coughing up blood, or coughing up blood-tinged mucus.  · Chest pain that is often worse with deep breaths.  · Rapid or irregular heartbeat.  · Feeling light-headed or dizzy.  · Fainting.  · Feeling anxious.  · Fever.  · Sweating.  · Pain and swelling in a leg. This is a symptom of DVT, which can lead to PE.  How is this diagnosed?  This condition may be diagnosed based on:  · Your medical history.  · A physical exam.  · Blood tests.  · CT pulmonary angiogram. This test checks blood flow in and around your  lungs.  · Ventilation-perfusion scan, also called a lung VQ scan. This test measures air flow and blood flow to the lungs.  · An ultrasound of the legs.  How is this treated?  Treatment for this condition depends on many factors, such as the cause of your PE, your risk for bleeding or developing more clots, and other medical conditions you have. Treatment aims to remove, dissolve, or stop blood clots from forming or growing larger. Treatment may include:  · Medicines, such as:  ? Blood thinning medicines (anticoagulants) to stop clots from forming.  ? Medicines that dissolve clots (thrombolytics).  · Procedures, such as:  ? Using a flexible tube to remove a blood clot (embolectomy) or to deliver medicine to destroy it (catheter-directed thrombolysis).  ? Inserting a filter into a large vein that carries blood to the heart (inferior vena cava). This filter (vena cava filter) catches blood clots before they reach the lungs.  ? Surgery to remove the clot (surgical embolectomy). This is rare.  You may need a combination of immediate, long-term (up to 3 months after diagnosis), and extended (more than 3 months after diagnosis) treatments. Your treatment may continue for several months (maintenance therapy). You and your health care provider will work together to choose the treatment program that is best for you.  Follow these instructions at home:  Medicines  · Take over-the-counter and prescription medicines only as told by your health care provider.  · If you are taking an anticoagulant medicine:  ? Take the medicine every day at the same time each day.  ? Understand what foods and drugs interact with your medicine.  ? Understand the side effects of this medicine, including excessive bruising or bleeding. Ask your health care provider or pharmacist about other side effects.  General instructions  · Wear a medical alert bracelet or carry a medical alert card that says you have had a PE and lists what medicines you  take.  · Ask your health care provider when you may return to your normal activities. Avoid sitting or lying for a long time without moving.  · Maintain a healthy weight. Ask your health care provider what weight is healthy for you.  · Do not use any products that contain nicotine or tobacco, such as cigarettes, e-cigarettes, and chewing tobacco. If you need help quitting, ask your health care provider.  · Talk with your health care provider about any travel plans. It is important to make sure that you are still able to take your medicine while on trips.  · Keep all follow-up visits as told by your health care provider. This is important.  Contact a health care provider if:  · You missed a dose of your blood thinner medicine.  Get help right away if:  · You have:  ? New or increased pain, swelling, warmth, or redness in an arm or leg.  ? Numbness or tingling in an arm or leg.  ? Shortness of breath during activity or at rest.  ? A fever.  ? Chest pain.  ? A rapid or irregular heartbeat.  ? A severe headache.  ? Vision changes.  ? A serious fall or accident, or you hit your head.  ? Stomach (abdominal) pain.  ? Blood in your vomit, stool, or urine.  ? A cut that will not stop bleeding.  · You cough up blood.  · You feel light-headed or dizzy.  · You cannot move your arms or legs.  · You are confused or have memory loss.  These symptoms may represent a serious problem that is an emergency. Do not wait to see if the symptoms will go away. Get medical help right away. Call your local emergency services (911 in the [place] Do not drive yourself to the hospital.  Summary  · A pulmonary embolism (PE) is a sudden blockage or decrease of blood flow in one or both lungs. PE is a dangerous and life-threatening condition that needs to be treated right away.  · Treatments for this condition usually include medicines to thin your blood (anticoagulants) or medicines to break apart blood clots (thrombolytics).  · If you are given  blood thinners, it is important to take the medicine every day at the same time each day.  · Understand what foods and drugs interact with any medicines that you are taking.  · If you have signs of PE or DVT, call your local emergency services (911 in the [place]  This information is not intended to replace advice given to you by your health care provider. Make sure you discuss any questions you have with your health care provider.  Document Released: 12/15/2001 Document Revised: 09/25/2019 Document Reviewed: 09/25/2019  [employer] Patient Education © 2020 [employer]    Discharge Instructions per Gabriel Chacon M.D.    1.  Please take Eliquis 10 mg twice a day for 7 days, then take 5 mg twice a day.  Please follow-up with hematology for possible hypercoagulable work-up and discuss the duration of Eliquis.  2.  Please take amlodipine 5 mg daily.  Check and log your blood pressure every day and follow up with your PCP  3.  Your liver enzymes are elevated.  Please stop alcohol intake.  You need to repeat your liver function in 1 month  4 . Follow-up with your primary care doctor in 1 week    DIET: low salt diet    ACTIVITY: As tolerated    DIAGNOSIS: Bilateral PE, hypertension, elevated liver enzyme, GERD    Return to ER in the event of new or worsening symptoms. Please note importance of compliance and the patient has agreed to proceed with all medical recommendations and follow up plan indicated above. All medications come with benefits and risks. Risks may include permanent injury or death and these risks can be minimized with close reassessment and monitoring. Please make it to your scheduled follow ups with PCP and hematology.

## 2021-01-07 NOTE — TELEPHONE ENCOUNTER
1. Caller Name: Prakash Kaur              Call Back Number: [phone]  [facility] PCP or Specialty Provider: Yes Margarito Adan        2.  Has the patient previously tested positive for COVID-19? No    3.  In the last two weeks, has the patient had any new or worsening symptoms (not explained by alternative diagnosis)? No.    4.  Does patient have any comoribidities? None     5.  Has the patient had any known contact with someone who is suspected or confirmed to have COVID-19? No.    5. Disposition: Cleared by RN Triage as potential is low for COVID-19; OK to keep/schedule appointment    Note routed to [facility] Provider: JOHN only.      Patient has been having shortness of breath and cough due to bilateral PE's he has been treated in the hospital and needs to establish a PCP for medciation refills and care.     Regarding: COVID SCREENING  ----- Message from Horacio Nunez sent at 1/7/2021  1:14 PM PST -----  TRANSFERRED PATIENT TO THE NURSE TEAM DUE TO THESE SYMPTOMS:   SHORTNESS OF BREATH / COUGH

## 2021-01-07 NOTE — DISCHARGE PLANNING
This CM received a Voalte message to check the coverage for Eliquis, This CM called the pharmacy, co-pay is $35.   This CM reported above info to bedside RN.

## 2021-01-08 ENCOUNTER — NURSE TRIAGE (OUTPATIENT)
Dept: HEALTH INFORMATION MANAGEMENT | Facility: OTHER | Age: 51
End: 2021-01-08

## 2021-01-08 NOTE — TELEPHONE ENCOUNTER
Appt w/new PCP, Dr. Margarito Adan, on 2/3.      Discharged on 1/6 from [facility] for blood clots in both lungs & HTN.  Wife is wondering if correct BP med was prescibed @ discharge, had to change BP med while in hospital.      Amlodipine was prescribed @ discharge.     BP this morning was 164/106.  BP yesterday was 159/108.

## 2021-01-14 ENCOUNTER — TELEPHONE (OUTPATIENT)
Dept: MEDICAL GROUP | Facility: PHYSICIAN GROUP | Age: 51
End: 2021-01-14

## 2021-01-14 NOTE — TELEPHONE ENCOUNTER
VOICEMAIL  1. Caller Name: Prakash                      Call Back Number: [phone]    2. Message: Patient is scheduled to establish with you 2/3/2021 and does not have a primary right now. He was admitted to [facility] for bi-lat pulmonary embolisms. He was released without much guidance and is still coughing and having SOB. He needs an URGENT referral to hematology. Please advise     3. Patient approves office to leave a detailed voicemail/MyChart message: N\A

## 2021-01-15 NOTE — TELEPHONE ENCOUNTER
"Please return the call to the (future) patient. :    - He has not established with me yet, so I generally should not be referring him yet.  And, the discharge note only mentions follow-up with hematology to discuss duration of Eliquis and hypercoagulable work-up.  However, since he had a PE, he will be on anticoagulation for at least 3 months (so I don't see an \"urgent\" referral as required), and this could wait until he establishes (unless he was told something contrary to the dc summary).     - PE's may take some time to resolve, but if his shortness of breath or coughing are getting worse, or worrisome, then he should seek an evaluation at an urgent care or the ER.     - If he will run-out of the medication early, or the cost of it is the concern, then (if needed) I could place an early referral to the anticoagulation clinic (to help with affordable options and refills).      Thank you.   Margarito Adan M.D., 1/14/2021     "

## 2021-01-15 NOTE — TELEPHONE ENCOUNTER
"Phone Number Called: [phone] (home)       Call outcome: Spoke to patient regarding message below.    Message: - He has not established with me yet, so I generally should not be referring him yet.  And, the discharge note only mentions follow-up with hematology to discuss duration of Eliquis and hypercoagulable work-up.  However, since he had a PE, he will be on anticoagulation for at least 3 months (so I don't see an \"urgent\" referral as required), and this could wait until he establishes (unless he was told something contrary to the dc summary).      - PE's may take some time to resolve, but if his shortness of breath or coughing are getting worse, or worrisome, then he should seek an evaluation at an urgent care or the ER.      - If he will run-out of the medication early, or the cost of it is the concern, then (if needed) I could place an early referral to the anticoagulation clinic (to help with affordable options and refills).       Thank you.   Margarito Adan M.D., 1/14/2021      "

## 2021-01-29 ENCOUNTER — TELEPHONE (OUTPATIENT)
Dept: MEDICAL GROUP | Facility: PHYSICIAN GROUP | Age: 51
End: 2021-01-29

## 2021-01-29 NOTE — TELEPHONE ENCOUNTER
Future Appointments       Provider Department Center    2/3/2021 9:00 AM Margarito Adan M.D. [facility] [place]        NEW PATIENT VISIT PRE-VISIT PLANNING    1.  [facility] Patient is checked in Patient Demographics?Yes    2.  Immunizations were updated in Epic using Reconcile Outside Information activity? Yes         3.  Is this appointment scheduled as a Hospital Follow-Up? No    4.  Patient is due for the following Health Maintenance Topics:   Health Maintenance Due   Topic Date Due   • IMM PNEUMOCOCCAL VACCINE: 0-64 Years (1 of 1 - PPSV23) 11/07/1978   • IMM DTaP/Tdap/Td Vaccine (1 - Tdap) 11/07/1991   • IMM INFLUENZA (1) 09/01/2020       - Patient has completed Immunizations: Pt would like to get vaccines during office visit and HMR Letter faxed to:  request sent to [facility] specialist, Dr. Linares.    5.  Reviewed/Updated the following with patient:       •   Preferred Pharmacy? Yes       •   Preferred Lab? Yes       •   Preferred Communication? Yes       •   Allergies? Yes       •   Medications? YES. Was Abstract Encounter opened and chart updated? YES       •   Social History? Yes       •   Family History (document living status of immediate family members and if + hx of  cancer, diabetes, hypertension, hyperlipidemia, heart attack, stroke) Yes    6.  Updated Care Team?       •   DME Company (gait device, O2, CPAP, etc.) YES, Pt does not use any DME devices        •   Other Specialists (eye doctor, derm, GYN, cardiology, endo, etc): YES, Care team updated     7.  AHA (Puls8) form printed for Provider? N/A

## 2021-02-01 SDOH — HEALTH STABILITY: MENTAL HEALTH: HOW OFTEN DO YOU HAVE A DRINK CONTAINING ALCOHOL?: 2-3 TIMES A WEEK

## 2021-02-01 SDOH — HEALTH STABILITY: MENTAL HEALTH: HOW MANY STANDARD DRINKS CONTAINING ALCOHOL DO YOU HAVE ON A TYPICAL DAY?: 3 OR 4

## 2021-02-01 SDOH — HEALTH STABILITY: MENTAL HEALTH: HOW OFTEN DO YOU HAVE 6 OR MORE DRINKS ON ONE OCCASION?: NEVER

## 2021-02-03 ENCOUNTER — OFFICE VISIT (OUTPATIENT)
Dept: MEDICAL GROUP | Facility: PHYSICIAN GROUP | Age: 51
End: 2021-02-03
Payer: COMMERCIAL

## 2021-02-03 VITALS
SYSTOLIC BLOOD PRESSURE: 128 MMHG | WEIGHT: 224 LBS | HEIGHT: 71 IN | TEMPERATURE: 97.4 F | DIASTOLIC BLOOD PRESSURE: 80 MMHG | BODY MASS INDEX: 31.36 KG/M2 | HEART RATE: 72 BPM | OXYGEN SATURATION: 96 %

## 2021-02-03 DIAGNOSIS — I10 ESSENTIAL HYPERTENSION: ICD-10-CM

## 2021-02-03 DIAGNOSIS — E78.5 HYPERLIPIDEMIA, UNSPECIFIED HYPERLIPIDEMIA TYPE: ICD-10-CM

## 2021-02-03 DIAGNOSIS — R79.89 ELEVATED LFTS: ICD-10-CM

## 2021-02-03 DIAGNOSIS — L98.9 SKIN LESION OF FACE: ICD-10-CM

## 2021-02-03 DIAGNOSIS — K21.00 GASTROESOPHAGEAL REFLUX DISEASE WITH ESOPHAGITIS WITHOUT HEMORRHAGE: ICD-10-CM

## 2021-02-03 DIAGNOSIS — K20.0 EOSINOPHILIC ESOPHAGITIS: ICD-10-CM

## 2021-02-03 DIAGNOSIS — I26.99 ACUTE PULMONARY EMBOLISM, UNSPECIFIED PULMONARY EMBOLISM TYPE, UNSPECIFIED WHETHER ACUTE COR PULMONALE PRESENT (HCC): ICD-10-CM

## 2021-02-03 DIAGNOSIS — J45.30 MILD PERSISTENT EXTRINSIC ASTHMA WITHOUT COMPLICATION: ICD-10-CM

## 2021-02-03 PROBLEM — R74.8 ELEVATED LIVER ENZYMES: Status: RESOLVED | Noted: 2021-01-05 | Resolved: 2021-02-03

## 2021-02-03 PROCEDURE — 99204 OFFICE O/P NEW MOD 45 MIN: CPT | Performed by: STUDENT IN AN ORGANIZED HEALTH CARE EDUCATION/TRAINING PROGRAM

## 2021-02-03 RX ORDER — AMLODIPINE BESYLATE 5 MG/1
5 TABLET ORAL DAILY
Qty: 90 TAB | Refills: 3 | Status: SHIPPED | OUTPATIENT
Start: 2021-02-03 | End: 2021-04-14 | Stop reason: SDUPTHER

## 2021-02-03 ASSESSMENT — FIBROSIS 4 INDEX: FIB4 SCORE: 1.85

## 2021-02-03 ASSESSMENT — PATIENT HEALTH QUESTIONNAIRE - PHQ9: CLINICAL INTERPRETATION OF PHQ2 SCORE: 0

## 2021-02-03 NOTE — ASSESSMENT & PLAN NOTE
No recent lipid panel in our system; however, he notes he has previously been told they were elevated, 3 testing for his work.  We will obtain new labs, to further evaluate.

## 2021-02-03 NOTE — ASSESSMENT & PLAN NOTE
Patient notes he has a history of GERD with reflux, and is on chronic PPI use for this, by his allergist.  He notes past history of eosinophilic esophagitis, as well managed by his allergist.  We will continue on Prilosec, for now.

## 2021-02-03 NOTE — ASSESSMENT & PLAN NOTE
Patient has a history of eosinophilic esophagitis, in the setting of GERD, and asthma.  He states it was diagnosed by GI biopsy in the past, but currently managed by Dr. Goldstein  (allergist).  He continues on Prilosec.

## 2021-02-03 NOTE — PROGRESS NOTES
New to Provider (and [facility])      Reason to establish: New patient to establish  Chief Complaint   Patient presents with   • Establish Care   • Hypertension     refill on amlodipine    • Skin Lesion     under left eye/ wants removed          HPI:   Prakash Kaur is a 50 y.o. male.       Pulmonary embolism (HCC)  Patient notes getting the Covid vaccine on December 30, and then starting to have mild cough on the 31st.  It progressively got worse over the next few days, developed pleuritic chest pain, and went to the hospital on 1/4/2021, where he was diagnosed with bilateral PE.    He was monitored in the hospital, and then sent home on Eliquis, with recommendations for heme-onc evaluation, at some point in the future, for determining if there were any other underlying coagulation defects, as he has not had any DVTs noted while in the hospital (but is already on anticoagulation, that may affect some of the test).  We will continue Eliquis, for at least 6 months.     (Also of note, he states that he has already went ahead and had his second vaccine shot, given that he was already on anticoagulation.)    Patient notes that since this began after having the vaccine, he reported a work claim to his work, and therefore, the Eliquis was already reordered by his Worker's Comp. Physician.      GERD (gastroesophageal reflux disease)  Patient notes he has a history of GERD with reflux, and is on chronic PPI use for this, by his allergist.  He notes past history of eosinophilic esophagitis, as well managed by his allergist.  We will continue on Prilosec, for now.    Eosinophilic esophagitis  Patient has a history of eosinophilic esophagitis, in the setting of GERD, and asthma.  He states it was diagnosed by GI biopsy in the past, but currently managed by Dr. Goldstein  (allergist).  He continues on Prilosec.    Allergic asthma  On Dulera, daily  Albuterol not needed in past week.   Manage by allergist.  "    Essential hypertension  Randomly had high BP readings, even prior to PE's.    Had been placed on amlodipine 5.   Notes occasional headaches, but denies any lightheadedness dizziness, or vision changes.  Continue on amlodipine 5, and have patient monitor home blood pressures for next visit.    Elevated LFTs  Patient lab work with elevated ALT and alkaline phosphatase.  Labs from 2 years ago he did not have this.  This may have begun after PE, but no interval levels, for comparison.    He does note a distant history of elevated LFTs, about 25 years ago.  However, states that have been normal in the interval.  Given his recent history, it is possible that there may have been fluid accumulation to the liver during his PEs.  He has had a negative hepatitis panel.    He does note drinking alcohol on a somewhat regular basis (and on weekends \"more than he should\").  And, he was advised to limit his Tylenol use (if needed can use, but try to keep under 1000 mg a day), and to reduce his alcohol use (preferably stop) for the next month.    Results for LAMBERTO KNOX   Ref. Range 1/6/2021 03:08   AST(SGOT) Latest Ref Range: 12 - 45 U/L 86 (H)   ALT(SGPT) Latest Ref Range: 2 - 50 U/L 135 (H)   Alkaline Phosphatase Latest Ref Range: 30 - 99 U/L 139 (H)   Total Bilirubin Latest Ref Range: 0.1 - 1.5 mg/dL 0.6       HLD (hyperlipidemia)  No recent lipid panel in our system; however, he notes he has previously been told they were elevated, 3 testing for his work.  We will obtain new labs, to further evaluate.    Skin lesion of face  Skin lesion near left lower eyelid, for past year.   Initially was fluid filled, and popped with needle.   However, tried again later, and no fluid came out.   About 4 mm (approx) across, and round.   Now bothering patient and irritating him.   Would like derm referal.       Review of Symptoms  GEN/CONST:   Denies fever, fatigue, weakness,     H/E:     Denies blurry vision or eye pain.  ENT:   " Denies sinus pain, sore throat, ear pain, difficulty hearing,    CARDIO:   Denies chest pain, palpitations,  or edema.  RESP:   + Notes occasional cough, and mild shortness of breath/dyspnea, since his PE, but significantly improving, over time.  GI:    Denies nausea, vomiting, diarrhea,  or abdominal pain.   :   Denies dysuria, hematuria, hesitancy  HEME:  Denies easy bruising, bleeding,   MSK:  Denies weakness, edema, or muscle aches.   NEURO:  Denies numbness  or focal weakness.    ENDO:  Denies heat or cold intolerance  PSYCH:  Denies anxiety, depression,       Past Medical History:   Diagnosis Date   • Allergic rhinitis    • Anesthesia     doesn't do well with novacaine/lidocaine, ponv   • Arthritis    • Asthma    • Asthma    • Eosinophilic esophagitis     Per GI Biopsy   • GERD (gastroesophageal reflux disease)    • History of pulmonary embolus (PE) 2021   • Indigestion        Past Surgical History:   Procedure Laterality Date   • INGUINAL HERNIA LAPAROSCOPIC  2012    Performed by ROLAND POLLOCK at SURGERY [facility]   • INGUINAL HERNIA REPAIR  2011    Performed by ROLAND POLLOCK at SURGERY [facility]   • EGD WITH ASP/BX  10/11    Dr Brice.  BX negative for Piper's       Family History   Problem Relation Age of Onset   • Heart Disease Mother    • Hypertension Father    • Stroke Paternal Uncle    • Heart Disease Maternal Grandfather          of heart attack at 55       Social History     Tobacco Use   • Smoking status: Never Smoker   • Smokeless tobacco: Never Used   Substance Use Topics   • Alcohol use: Yes     Frequency: 2-3 times a week     Drinks per session: 3 or 4     Binge frequency: Never     Comment: often 2/d, but sometimes 4/d on weekends. -       Current Outpatient Medications   Medication Sig Dispense Refill   • amLODIPine (NORVASC) 5 MG Tab Take 1 Tab by mouth every day. 90 Tab 3   • apixaban (ELIQUIS) 5mg Tab Take 1 Tab by mouth 2 Times a Day for 30 days.  "Indications: DVT/PE 70 Tab 0   • Mometasone Furo-Formoterol Fum (DULERA) 100-5 MCG/ACT Aerosol Inhale 2 Puffs every day.     • omeprazole (PRILOSEC) 20 MG delayed-release capsule Take 20 mg by mouth every day.     • albuterol (VENTOLIN OR PROVENTIL) 108 (90 BASE) MCG/ACT Aero Soln inhalation aerosol Inhale 2 Puffs by mouth every four hours as needed for Shortness of Breath. 8.5 g 3   • cetirizine (ZYRTEC) 10 MG TABS Take 10 mg by mouth every day.       No current facility-administered medications for this visit.        Allergies as of 02/03/2021 - Reviewed 02/03/2021   Allergen Reaction Noted   • Nkda [no known drug allergy]  11/30/2011         Physical Exam  /80   Pulse 72   Temp 36.3 °C (97.4 °F) (Temporal)   Ht 1.803 m (5' 11\")   Wt 102 kg (224 lb)   SpO2 96%   BMI 31.24 kg/m²   General:  Alert and oriented, No apparent distress.  Lungs: Clear to auscultation bilaterally.  No wheezes or rales.  Cardiovascular: Regular rate and rhythm.  No murmurs, rubs or gallops.  Abdomen:  Soft.  Non-distended.  Non-tender.  No rebound or guarding noted.  Extremities: No pedal edema.  Good general motion of all 4 extremities.   Psychological: Appears to have normal mood and affect.  Skin:  + Patient has approximately 3-4 mm lesion under his left lower eyelid.  It appears flesh-colored and fluid-filled.  No current erythema or bleeding noted.      Labs:  Recent labs reviewed.   CBC and CMP and Hep-panel.       Assessment & Plan    1. Acute pulmonary embolism, unspecified pulmonary embolism type, unspecified whether acute cor pulmonale present (HCC)  Patient was found to have bilateral PEs, 1 month ago.  No DVTs noted, and no obvious cause.  However, it was just after having Covid vaccination.  Per hospitalist, they had recommended further evaluation by hematology, for coagulation issues, etiology, and duration of treatment.  Referral placed.  He will continue on Eliquis, for at least 6 months.  - REFERRAL TO " "HEMATOLOGY ONCOLOGY    2. Gastroesophageal reflux disease with esophagitis without hemorrhage  3. Eosinophilic esophagitis  Patient has eosinophilic esophagitis, by prior biopsy, and on PPI, for GERD.  He notes that this medication had been started and currently managed by his allergist.  Can continue on PPI.    4. Mild persistent extrinsic asthma without complication  Patient follows with allergist, as well as previously noting eosinophilic esophagitis.  Currently on Dulera daily, and not requiring albuterol, except rarely.  Continue with same plan.  Managed by allergist.    5. Essential hypertension  Previously started on amlodipine, but still somewhat recently.  Refill prescription on this visit.  Patient notes occasional headaches but otherwise denies symptoms.  He will monitor blood pressure at home, and bring with him for his follow-up visit.  - amLODIPine (NORVASC) 5 MG Tab; Take 1 Tab by mouth every day.  Dispense: 90 Tab; Refill: 3    6. Elevated LFTs  No obvious cause for your elevated LFTs, but may be secondary to backing up of blood, related to PEs.  However, could also be related to DEMARCO.  Hepatitis panel was negative.  Will monitor in the future, for changes.  If worsening, then may consider ultrasound.  - Comp Metabolic Panel; Future    7. Hyperlipidemia, unspecified hyperlipidemia type  He notes a prior history of this from outside labs, but no lab values in the system.  Will obtain new labs.  - Lipid Profile; Future    8. Skin lesion of face  Patient has approximately 3 mm cystic lesion just under his left eyelid.  He tried popping it before, but subsequently it would not \"pop\" again.  Due to unusual presentation, as well as the location of this, will refer to dermatology, for further evaluation and management.  - REFERRAL TO DERMATOLOGY      Health Maintenance Review  influ vaccine - no/declines  Pneumo Vacc - n/a  Tetanus -  about  7 yrs ago  Shingles - n/a  Colonoscopy - n/a  (will be due in ~ 2 " yrs).   PSA - n/a    Dexa - n/a  Labs < 12 mo / met screen - reviewed/ordered.       Patient to follow-up in 2 months, for further evaluation of blood pressure, LFTs, and new lab results.    A computerized dictation system may have been used in this note.    Despite review, there may be some spelling or grammatical errors.    Margarito Adan M.D.  2/3/2021

## 2021-02-03 NOTE — ASSESSMENT & PLAN NOTE
"Patient lab work with elevated ALT and alkaline phosphatase.  Labs from 2 years ago he did not have this.  This may have begun after PE, but no interval levels, for comparison.    He does note a distant history of elevated LFTs, about 25 years ago.  However, states that have been normal in the interval.  Given his recent history, it is possible that there may have been fluid accumulation to the liver during his PEs.  He has had a negative hepatitis panel.    He does note drinking alcohol on a somewhat regular basis (and on weekends \"more than he should\").  And, he was advised to limit his Tylenol use (if needed can use, but try to keep under 1000 mg a day), and to reduce his alcohol use (preferably stop) for the next month.    Results for LAMBERTO KNOX   Ref. Range 1/6/2021 03:08   AST(SGOT) Latest Ref Range: 12 - 45 U/L 86 (H)   ALT(SGPT) Latest Ref Range: 2 - 50 U/L 135 (H)   Alkaline Phosphatase Latest Ref Range: 30 - 99 U/L 139 (H)   Total Bilirubin Latest Ref Range: 0.1 - 1.5 mg/dL 0.6     "

## 2021-02-03 NOTE — ASSESSMENT & PLAN NOTE
Skin lesion near left lower eyelid, for past year.   Initially was fluid filled, and popped with needle.   However, tried again later, and no fluid came out.   About 4 mm (approx) across, and round.   Now bothering patient and irritating him.   Would like derm referal.

## 2021-02-03 NOTE — ASSESSMENT & PLAN NOTE
Patient notes getting the Covid vaccine on December 30, and then starting to have mild cough on the 31st.  It progressively got worse over the next few days, developed pleuritic chest pain, and went to the hospital on 1/4/2021, where he was diagnosed with bilateral PE.    He was monitored in the hospital, and then sent home on Eliquis, with recommendations for heme-onc evaluation, at some point in the future, for determining if there were any other underlying coagulation defects, as he has not had any DVTs noted while in the hospital (but is already on anticoagulation, that may affect some of the test).  We will continue Eliquis, for at least 6 months.     (Also of note, he states that he has already went ahead and had his second vaccine shot, given that he was already on anticoagulation.)    Patient notes that since this began after having the vaccine, he reported a work claim to his work, and therefore, the Eliquis was already reordered by his Worker's Comp. Physician.

## 2021-02-03 NOTE — ASSESSMENT & PLAN NOTE
Randomly had high BP readings, even prior to PE's.    Had been placed on amlodipine 5.   Notes occasional headaches, but denies any lightheadedness dizziness, or vision changes.  Continue on amlodipine 5, and have patient monitor home blood pressures for next visit.

## 2021-02-03 NOTE — PATIENT INSTRUCTIONS
Please return in a couple months, and get new labs a few days prior to the next appointment.    Please bring a list of home blood pressure measurements.   Thank you.       How to Take Your Blood Pressure  You can take your blood pressure at home with a machine. You may need to check your blood pressure at home:  · To check if you have high blood pressure (hypertension).  · To check your blood pressure over time.  · To make sure your blood pressure medicine is working.  Supplies needed:  You will need a blood pressure machine, or monitor. You can buy one at a [employer] or online. When choosing one:  · Choose one with an arm cuff.  · Choose one that wraps around your upper arm. Only one finger should fit between your arm and the cuff.  · Do not choose one that measures your blood pressure from your wrist or finger.  Your doctor can suggest a monitor.  How to prepare  Avoid these things for 30 minutes before checking your blood pressure:  · Drinking caffeine.  · Drinking alcohol.  · Eating.  · Smoking.  · Exercising.  Five minutes before checking your blood pressure:  · Pee.  · Sit in a dining chair. Avoid sitting in a soft couch or armchair.  · Be quiet. Do not talk.  How to take your blood pressure  Follow the instructions that came with your machine. If you have a digital blood pressure monitor, these may be the instructions:  1. Sit up straight.  2. Place your feet on the floor. Do not cross your ankles or legs.  3. Rest your left arm at the level of your heart. You may rest it on a table, desk, or chair.  4. Pull up your shirt sleeve.  5. Wrap the blood pressure cuff around the upper part of your left arm. The cuff should be 1 inch (2.5 cm) above your elbow. It is best to wrap the cuff around bare skin.  6. Fit the cuff snugly around your arm. You should be able to place only one finger between the cuff and your arm.  7. Put the cord inside the groove of your elbow.  8. Press the power button.  9. Sit quietly  "while the cuff fills with air and loses air.  10. Write down the numbers on the screen.  11. Wait 2-3 minutes and then repeat steps 1-10.  What do the numbers mean?  Two numbers make up your blood pressure. The first number is called systolic pressure. The second is called diastolic pressure. An example of a blood pressure reading is \"120 over 80\" (or 120/80).  If you are an adult and do not have a medical condition, use this guide to find out if your blood pressure is normal:  Normal  · First number: below 120.  · Second number: below 80.  Elevated  · First number: 120-129.  · Second number: below 80.  Hypertension stage 1  · First number: 130-139.  · Second number: 80-89.  Hypertension stage 2  · First number: 140 or above.  · Second number: 90 or above.  Your blood pressure is above normal even if only the top or bottom number is above normal.  Follow these instructions at home:  · Check your blood pressure as often as your doctor tells you to.  · Take your monitor to your next doctor's appointment. Your doctor will:  ? Make sure you are using it correctly.  ? Make sure it is working right.  · Make sure you understand what your blood pressure numbers should be.  · Tell your doctor if your medicines are causing side effects.  Contact a doctor if:  · Your blood pressure keeps being high.  Get help right away if:  · Your first blood pressure number is higher than 180.  · Your second blood pressure number is higher than 120.  This information is not intended to replace advice given to you by your health care provider. Make sure you discuss any questions you have with your health care provider.  Document Released: 11/30/2009 Document Revised: 11/30/2018 Document Reviewed: 05/26/2017  Elsevier Patient Education © 2020 Else[employer] Inc[employer]        Blood Pressure Record Sheet  To take your blood pressure, you will need a blood pressure machine. You can buy a blood pressure machine (blood pressure monitor) at your clinic, drug " store, or online. When choosing one, consider:  · An automatic monitor that has an arm cuff.  · A cuff that wraps snugly around your upper arm. You should be able to fit only one finger between your arm and the cuff.  · A device that stores blood pressure reading results.  · Do not choose a monitor that measures your blood pressure from your wrist or finger.  Follow your health care provider's instructions for how to take your blood pressure. To use this form:  · Get one reading in the morning (a.m.) before you take any medicines.  · Get one reading in the evening (p.m.) before supper.  · Take at least 2 readings with each blood pressure check. This makes sure the results are correct. Wait 1-2 minutes between measurements.  · Write down the results in the spaces on this form.  · Repeat this once a week, or as told by your health care provider.  · Make a follow-up appointment with your health care provider to discuss the results.  Blood pressure log  Date: _______________________  · a.m. _____________________(1st reading) _____________________(2nd reading)  · p.m. _____________________(1st reading) _____________________(2nd reading)  Date: _______________________  · a.m. _____________________(1st reading) _____________________(2nd reading)  · p.m. _____________________(1st reading) _____________________(2nd reading)  Date: _______________________  · a.m. _____________________(1st reading) _____________________(2nd reading)  · p.m. _____________________(1st reading) _____________________(2nd reading)  Date: _______________________  · a.m. _____________________(1st reading) _____________________(2nd reading)  · p.m. _____________________(1st reading) _____________________(2nd reading)  Date: _______________________  · a.m. _____________________(1st reading) _____________________(2nd reading)  · p.m. _____________________(1st reading) _____________________(2nd reading)  This information is not intended to replace advice  given to you by your health care provider. Make sure you discuss any questions you have with your health care provider.  Document Released: 09/15/2004 Document Revised: 02/15/2019 Document Reviewed: 12/18/2018  Elsevier Patient Education © 2020 Elsevier Inc.

## 2021-02-12 ENCOUNTER — OFFICE VISIT (OUTPATIENT)
Dept: DERMATOLOGY | Facility: IMAGING CENTER | Age: 51
End: 2021-02-12
Payer: COMMERCIAL

## 2021-02-12 VITALS — WEIGHT: 210 LBS | BODY MASS INDEX: 29.4 KG/M2 | TEMPERATURE: 98.2 F | HEIGHT: 71 IN

## 2021-02-12 DIAGNOSIS — D49.2 NEOPLASM OF SKIN: ICD-10-CM

## 2021-02-12 PROCEDURE — 11102 TANGNTL BX SKIN SINGLE LES: CPT | Performed by: DERMATOLOGY

## 2021-02-12 ASSESSMENT — FIBROSIS 4 INDEX: FIB4 SCORE: 1.85

## 2021-02-12 NOTE — PROGRESS NOTES
CC: Skin Lesion      Subjective: New patient here for skin lesion.  Had tried to drain twice, once worked, then did not.  May be growing.    HPI/location: flesh colored papule under left eye  Time present: 1 year  Painful lesion: No  Itching lesion: No  Enlarging lesion: No  Anything make it better or worse? No    History of skin cancer: No  History of precancers/actinic keratoses: No  History of biopsies:No  History of blistering/severe sunburns:Yes, Details: throughout life  Family history of skin cancer:No  Family history of atypical moles:No      ROS: no fevers/chills. No itch.  No cough  DermPMH: no skin cancer/melanoma  No problem-specific Assessment & Plan notes found for this encounter.    Relevant PMH:Hx PE - Eliquis  Social:never smoker    PE: Gen:WDWN male in NAD.  Skin: focal exam: approx 5-6mm cystic-appearing nodule+telangiectasias on left infraeyelid      A/P: Neoplasm NOS: r/o BCC, hidrocystoma vs syringoma, other  -consent for bx, including R/B/A. Cleaned with EtOH, anesthesia with lidocaine 1% + epinephrine, shave bx, AlCl3 for hemostasis  -vaseline/bandage and wound care reviewed      I have reviewed medications relevant to my specialty.

## 2021-02-22 ENCOUNTER — TELEPHONE (OUTPATIENT)
Dept: DERMATOLOGY | Facility: IMAGING CENTER | Age: 51
End: 2021-02-22

## 2021-02-22 NOTE — TELEPHONE ENCOUNTER
Patient notified of Bx results benign  through D - path . Left eyelid , if recurrence can return for additional treatment . Scanned in media tab

## 2021-03-01 ASSESSMENT — ENCOUNTER SYMPTOMS
SHORTNESS OF BREATH: 0
DEPRESSION: 0
BLURRED VISION: 0
INSOMNIA: 0
NERVOUS/ANXIOUS: 0
SENSORY CHANGE: 0
DIAPHORESIS: 0
DOUBLE VISION: 0
TINGLING: 0
DIZZINESS: 0
WHEEZING: 0
CHILLS: 0
COUGH: 0
VOMITING: 0
HEADACHES: 0
FEVER: 0
MYALGIAS: 0
SPUTUM PRODUCTION: 0
PHOTOPHOBIA: 0
DIARRHEA: 0
BRUISES/BLEEDS EASILY: 0
WEIGHT LOSS: 0
NAUSEA: 0
CONSTIPATION: 0

## 2021-03-01 NOTE — PROGRESS NOTES
Consult:  Hematology/Oncology      Referring Physician: Same as PCP  Primary Care:  Margarito Adan M.D.    Diagnosis: Bilateral PE    Chief Complaint:      History of Presenting Illness:  Prakash Kaur is a 50 y.o. male presented to medical attention with mild cough after being vaccinated for COVID-19 on December 30, 2020.  In the next few days he developed pleuritic chest pain and was diagnosed with a bilateral PE on January 4, 2021.  A D-dimer on 1/3/2021 was elevated at 1.9.  A Covid test on 1/3/21 was negative.  During hospitalization the patient experienced severe headache and increased blood pressure.  A CT of the head was performed and was unrevealing 1/6/21.  There are segmental and subsegmental bilateral lower lobe pulmonary emboli. RV:LV ratio is normal. Central pulmonary arteries are normal in   caliber. Heart size is normal. No pericardial effusion. Aorta is normal in caliber without aneurysm or dissection.  Spleen is borderline enlarged  January 5 CBC showing hemoglobin of 13.9 with an MCV of 90 and a platelet count 185,000  The patient is notable to have an elevation of the LFTs on 2 occasion with a nonreactive screening for hepatitis A, B, and C.  Ultrasound was negative for DVT of the lower extremity  There was moderate dilation of the right ventricle  The left ventricular ejection fraction was 55%  There was no mention of pulmonary hypertension  Interval History:  Patient is here for consultation.      Past Medical History:   Diagnosis Date   • Allergic rhinitis    • Anesthesia     doesn't do well with novacaine/lidocaine, ponv   • Arthritis    • Asthma    • Asthma    • Eosinophilic esophagitis 2011    Per GI Biopsy   • GERD (gastroesophageal reflux disease)    • History of pulmonary embolus (PE) 01/2021   • Indigestion        Past Surgical History:   Procedure Laterality Date   • INGUINAL HERNIA LAPAROSCOPIC  8/29/2012    Performed by ROLAND POLLOCK at SURGERY [facility]   •  INGUINAL HERNIA REPAIR  2011    Performed by ROLAND POLLOCK at SURGERY [facility]S   • EGD WITH ASP/BX  10/11    Dr Brice.  BX negative for Piper's       Social History     Socioeconomic History   • Marital status:      Spouse name: Not on file   • Number of children: Not on file   • Years of education: Not on file   • Highest education level: Not on file   Occupational History   • Occupation:      Employer: [employer]   Tobacco Use   • Smoking status: Never Smoker   • Smokeless tobacco: Never Used   Substance and Sexual Activity   • Alcohol use: Yes     Comment: often 2/d, but sometimes 4/d on weekends. -   • Drug use: No   • Sexual activity: Yes     Partners: Female   Other Topics Concern   • Not on file   Social History Narrative    ** Merged History Encounter **          Social Determinants of Health     Financial Resource Strain:    • Difficulty of Paying Living Expenses:    Food Insecurity:    • Worried About Running Out of Food in the Last Year:    • Ran Out of Food in the Last Year:    Transportation Needs:    • Lack of Transportation (Medical):    • Lack of Transportation (Non-Medical):    Physical Activity:    • Days of Exercise per Week:    • Minutes of Exercise per Session:    Stress:    • Feeling of Stress :    Social Connections:    • Frequency of Communication with Friends and Family:    • Frequency of Social Gatherings with Friends and Family:    • Attends Mosque Services:    • Active Member of Clubs or Organizations:    • Attends Club or Organization Meetings:    • Marital Status:    Intimate Partner Violence:    • Fear of Current or Ex-Partner:    • Emotionally Abused:    • Physically Abused:    • Sexually Abused:        Family History   Problem Relation Age of Onset   • Heart Disease Mother    • Hypertension Father    • Stroke Paternal Uncle    • Heart Disease Maternal Grandfather          of heart attack at 55       OB History   No  obstetric history on file.       Allergies as of 03/02/2021 - Reviewed 03/02/2021   Allergen Reaction Noted   • Nkda [no known drug allergy]  11/30/2011         Current Outpatient Medications:   •  apixaban (ELIQUIS) 5mg Tab, Take 5 mg by mouth 2 Times a Day., Disp: , Rfl:   •  amLODIPine (NORVASC) 5 MG Tab, Take 1 Tab by mouth every day., Disp: 90 Tab, Rfl: 3  •  Mometasone Furo-Formoterol Fum (DULERA) 100-5 MCG/ACT Aerosol, Inhale 2 Puffs every day., Disp: , Rfl:   •  omeprazole (PRILOSEC) 20 MG delayed-release capsule, Take 20 mg by mouth every day., Disp: , Rfl:   •  albuterol (VENTOLIN OR PROVENTIL) 108 (90 BASE) MCG/ACT Aero Soln inhalation aerosol, Inhale 2 Puffs by mouth every four hours as needed for Shortness of Breath., Disp: 8.5 g, Rfl: 3  •  cetirizine (ZYRTEC) 10 MG TABS, Take 10 mg by mouth every day., Disp: , Rfl:     Review of Systems:  Review of Systems   Constitutional: Negative for chills, diaphoresis, fever, malaise/fatigue and weight loss.   HENT: Negative for nosebleeds and tinnitus.    Eyes: Negative for blurred vision, double vision and photophobia.   Respiratory: Negative for cough, sputum production, shortness of breath and wheezing.    Cardiovascular: Negative for chest pain and leg swelling.   Gastrointestinal: Negative for constipation, diarrhea, nausea and vomiting.   Genitourinary: Negative for dysuria.   Musculoskeletal: Negative for joint pain and myalgias.   Skin: Negative for rash.   Neurological: Negative for dizziness, tingling, sensory change and headaches.   Endo/Heme/Allergies: Does not bruise/bleed easily.   Psychiatric/Behavioral: Negative for depression. The patient is not nervous/anxious and does not have insomnia.           Physical Exam:  Vitals:    03/02/21 0839   BP: 142/82   BP Location: Right arm   Patient Position: Sitting   BP Cuff Size: Adult   Pulse: 66   Resp: 14   Temp: 36.1 °C (96.9 °F)   TempSrc: Temporal   SpO2: 96%   Weight: 102 kg (224 lb 15.7 oz)  "  Height: 1.803 m (5' 11\")       Physical Exam  Vitals reviewed.   Constitutional:       General: He is not in acute distress.  HENT:      Head: Normocephalic.      Mouth/Throat:      Mouth: Mucous membranes are moist.   Eyes:      Extraocular Movements: Extraocular movements intact.      Pupils: Pupils are equal, round, and reactive to light.   Cardiovascular:      Rate and Rhythm: Normal rate and regular rhythm.      Pulses: Normal pulses.      Heart sounds: Normal heart sounds.   Pulmonary:      Effort: Pulmonary effort is normal.      Breath sounds: Normal breath sounds.   Abdominal:      General: Bowel sounds are normal.      Palpations: Abdomen is soft.      Tenderness: There is no abdominal tenderness.   Musculoskeletal:         General: Normal range of motion.      Cervical back: Normal range of motion and neck supple.   Lymphadenopathy:      Head:      Right side of head: No submental, submandibular, tonsillar, preauricular, posterior auricular or occipital adenopathy.      Left side of head: No submental, submandibular, tonsillar, preauricular, posterior auricular or occipital adenopathy.      Cervical: No cervical adenopathy.      Right cervical: No superficial, deep or posterior cervical adenopathy.     Left cervical: No superficial, deep or posterior cervical adenopathy.      Upper Body:      Right upper body: No supraclavicular, axillary, pectoral or epitrochlear adenopathy.      Left upper body: No supraclavicular, axillary, pectoral or epitrochlear adenopathy.      Lower Body: No right inguinal adenopathy. No left inguinal adenopathy.   Skin:     General: Skin is warm and dry.   Neurological:      General: No focal deficit present.      Mental Status: He is alert and oriented to person, place, and time.   Psychiatric:         Mood and Affect: Mood normal.          Labs:  No visits with results within 1 Day(s) from this visit.   Latest known visit with results is:   Admission on 01/04/2021, Discharged " on 2021   Component Date Value Ref Range Status   • Report 2021    Preliminary                    Value:[facility] Emergency Dept.    Test Date:  2021  Pt Name:    LAMBERTO KNOX                 Department: [facility]  MRN:        [id number]                      Room:  Gender:     Male                         Technician: GT  :        1970                   Requested By:ER TRIAGE PROTOCOL  Order #:    [id number]                    Reading MD:    Measurements  Intervals                                Axis  Rate:       70                           P:          43  NH:         147                          QRS:        42  QRSD:       89                           T:          34  QT:         391  QTc:        422    Interpretive Statements  Sinus rhythm  Compared to ECG 2019 13:51:41  No significant changes     • WBC 2021 5.5  4.8 - 10.8 K/uL Final   • RBC 2021 4.62* 4.70 - 6.10 M/uL Final   • Hemoglobin 2021 14.4  14.0 - 18.0 g/dL Final   • Hematocrit 2021 41.5* 42.0 - 52.0 % Final   • MCV 2021 89.8  81.4 - 97.8 fL Final   • MCH 2021 31.2  27.0 - 33.0 pg Final   • MCHC 2021 34.7  33.7 - 35.3 g/dL Final   • RDW 2021 38.6  35.9 - 50.0 fL Final   • Platelet Count 2021 172  164 - 446 K/uL Final   • MPV 2021 10.0  9.0 - 12.9 fL Final   • Neutrophils-Polys 2021 55.50  44.00 - 72.00 % Final   • Lymphocytes 2021 33.50  22.00 - 41.00 % Final   • Monocytes 2021 7.10  0.00 - 13.40 % Final   • Eosinophils 2021 2.70  0.00 - 6.90 % Final   • Basophils 2021 0.70  0.00 - 1.80 % Final   • Immature Granulocytes 2021 0.50  0.00 - 0.90 % Final   • Nucleated RBC 2021 0.00  /100 WBC Final   • Neutrophils (Absolute) 2021 3.06  1.82 - 7.42 K/uL Final    Includes immature neutrophils, if present.   • Lymphs (Absolute) 2021 1.85  1.00 - 4.80 K/uL Final   • Monos (Absolute) 2021 0.39   0.00 - 0.85 K/uL Final   • Eos (Absolute) 01/04/2021 0.15  0.00 - 0.51 K/uL Final   • Baso (Absolute) 01/04/2021 0.04  0.00 - 0.12 K/uL Final   • Immature Granulocytes (abs) 01/04/2021 0.03  0.00 - 0.11 K/uL Final   • NRBC (Absolute) 01/04/2021 0.00  K/uL Final   • Sodium 01/04/2021 142  135 - 145 mmol/L Final   • Potassium 01/04/2021 3.9  3.6 - 5.5 mmol/L Final   • Chloride 01/04/2021 107  96 - 112 mmol/L Final   • Co2 01/04/2021 26  20 - 33 mmol/L Final   • Glucose 01/04/2021 93  65 - 99 mg/dL Final   • Bun 01/04/2021 10  8 - 22 mg/dL Final   • Creatinine 01/04/2021 1.01  0.50 - 1.40 mg/dL Final   • Calcium 01/04/2021 8.8  8.4 - 10.2 mg/dL Final   • Anion Gap 01/04/2021 9.0  7.0 - 16.0 Final   • Troponin T 01/04/2021 24* 6 - 19 ng/L Final    Comment: As of July 9th 2019 at 0900, the Troponin I test has been replaced with the  Ultra High Sensitivity (Gen 5) Troponin T test.  Please note new analyte,  methodology, and reference range.  The Ultra High Sensitivity Troponin T test has a reference range for  positive troponins that follows the recommendation of the American College  of Cardiology (ACC) committee in conjunction with the 99th percentile  reference population. Normal range: 6-19 ng/L     • GFR If  01/04/2021 >60  >60 mL/min/1.73 m 2 Final   • GFR If Non  01/04/2021 >60  >60 mL/min/1.73 m 2 Final   • Eject.Frac. MOD BP 01/05/2021 54.01   Final   • Eject.Frac. MOD 4C 01/05/2021 57.66   Final   • Eject.Frac. MOD 2C 01/05/2021 51.43   Final   • Left Ventrical Ejection Fraction 01/05/2021 55   Final   • WBC 01/05/2021 5.0  4.8 - 10.8 K/uL Final   • RBC 01/05/2021 4.53* 4.70 - 6.10 M/uL Final   • Hemoglobin 01/05/2021 13.9* 14.0 - 18.0 g/dL Final   • Hematocrit 01/05/2021 40.7* 42.0 - 52.0 % Final   • MCV 01/05/2021 89.8  81.4 - 97.8 fL Final   • MCH 01/05/2021 30.7  27.0 - 33.0 pg Final   • MCHC 01/05/2021 34.2  33.7 - 35.3 g/dL Final   • RDW 01/05/2021 38.6  35.9 - 50.0 fL Final    • Platelet Count 01/05/2021 185  164 - 446 K/uL Final   • MPV 01/05/2021 10.7  9.0 - 12.9 fL Final   • Neutrophils-Polys 01/05/2021 52.40  44.00 - 72.00 % Final   • Lymphocytes 01/05/2021 35.80  22.00 - 41.00 % Final   • Monocytes 01/05/2021 8.00  0.00 - 13.40 % Final   • Eosinophils 01/05/2021 2.80  0.00 - 6.90 % Final   • Basophils 01/05/2021 0.60  0.00 - 1.80 % Final   • Immature Granulocytes 01/05/2021 0.40  0.00 - 0.90 % Final   • Nucleated RBC 01/05/2021 0.00  /100 WBC Final   • Neutrophils (Absolute) 01/05/2021 2.62  1.82 - 7.42 K/uL Final    Includes immature neutrophils, if present.   • Lymphs (Absolute) 01/05/2021 1.79  1.00 - 4.80 K/uL Final   • Monos (Absolute) 01/05/2021 0.40  0.00 - 0.85 K/uL Final   • Eos (Absolute) 01/05/2021 0.14  0.00 - 0.51 K/uL Final   • Baso (Absolute) 01/05/2021 0.03  0.00 - 0.12 K/uL Final   • Immature Granulocytes (abs) 01/05/2021 0.02  0.00 - 0.11 K/uL Final   • NRBC (Absolute) 01/05/2021 0.00  K/uL Final   • Sodium 01/05/2021 146* 135 - 145 mmol/L Final   • Potassium 01/05/2021 4.2  3.6 - 5.5 mmol/L Final   • Chloride 01/05/2021 110  96 - 112 mmol/L Final   • Co2 01/05/2021 26  20 - 33 mmol/L Final   • Anion Gap 01/05/2021 10.0  7.0 - 16.0 Final   • Glucose 01/05/2021 79  65 - 99 mg/dL Final   • Bun 01/05/2021 11  8 - 22 mg/dL Final   • Creatinine 01/05/2021 1.07  0.50 - 1.40 mg/dL Final   • Calcium 01/05/2021 8.7  8.4 - 10.2 mg/dL Final   • AST(SGOT) 01/05/2021 63* 12 - 45 U/L Final   • ALT(SGPT) 01/05/2021 104* 2 - 50 U/L Final   • Alkaline Phosphatase 01/05/2021 125* 30 - 99 U/L Final   • Total Bilirubin 01/05/2021 0.6  0.1 - 1.5 mg/dL Final   • Albumin 01/05/2021 3.5  3.2 - 4.9 g/dL Final   • Total Protein 01/05/2021 6.3  6.0 - 8.2 g/dL Final   • Globulin 01/05/2021 2.8  1.9 - 3.5 g/dL Final   • A-G Ratio 01/05/2021 1.3  g/dL Final   • GFR If  01/05/2021 >60  >60 mL/min/1.73 m 2 Final   • GFR If Non  01/05/2021 >60  >60 mL/min/1.73 m 2  Final   • WBC 01/06/2021 4.9  4.8 - 10.8 K/uL Final   • RBC 01/06/2021 4.78  4.70 - 6.10 M/uL Final   • Hemoglobin 01/06/2021 14.8  14.0 - 18.0 g/dL Final   • Hematocrit 01/06/2021 42.3  42.0 - 52.0 % Final   • MCV 01/06/2021 88.5  81.4 - 97.8 fL Final   • MCH 01/06/2021 31.0  27.0 - 33.0 pg Final   • MCHC 01/06/2021 35.0  33.7 - 35.3 g/dL Final   • RDW 01/06/2021 37.4  35.9 - 50.0 fL Final   • Platelet Count 01/06/2021 192  164 - 446 K/uL Final   • MPV 01/06/2021 10.3  9.0 - 12.9 fL Final   • Neutrophils-Polys 01/06/2021 50.50  44.00 - 72.00 % Final   • Lymphocytes 01/06/2021 38.40  22.00 - 41.00 % Final   • Monocytes 01/06/2021 6.40  0.00 - 13.40 % Final   • Eosinophils 01/06/2021 3.50  0.00 - 6.90 % Final   • Basophils 01/06/2021 0.80  0.00 - 1.80 % Final   • Immature Granulocytes 01/06/2021 0.40  0.00 - 0.90 % Final   • Nucleated RBC 01/06/2021 0.00  /100 WBC Final   • Neutrophils (Absolute) 01/06/2021 2.46  1.82 - 7.42 K/uL Final    Includes immature neutrophils, if present.   • Lymphs (Absolute) 01/06/2021 1.87  1.00 - 4.80 K/uL Final   • Monos (Absolute) 01/06/2021 0.31  0.00 - 0.85 K/uL Final   • Eos (Absolute) 01/06/2021 0.17  0.00 - 0.51 K/uL Final   • Baso (Absolute) 01/06/2021 0.04  0.00 - 0.12 K/uL Final   • Immature Granulocytes (abs) 01/06/2021 0.02  0.00 - 0.11 K/uL Final   • NRBC (Absolute) 01/06/2021 0.00  K/uL Final   • Sodium 01/06/2021 141  135 - 145 mmol/L Final   • Potassium 01/06/2021 4.2  3.6 - 5.5 mmol/L Final   • Chloride 01/06/2021 107  96 - 112 mmol/L Final   • Co2 01/06/2021 23  20 - 33 mmol/L Final   • Anion Gap 01/06/2021 11.0  7.0 - 16.0 Final   • Glucose 01/06/2021 93  65 - 99 mg/dL Final   • Bun 01/06/2021 13  8 - 22 mg/dL Final   • Creatinine 01/06/2021 1.01  0.50 - 1.40 mg/dL Final   • Calcium 01/06/2021 8.8  8.4 - 10.2 mg/dL Final   • AST(SGOT) 01/06/2021 86* 12 - 45 U/L Final   • ALT(SGPT) 01/06/2021 135* 2 - 50 U/L Final   • Alkaline Phosphatase 01/06/2021 139* 30 - 99 U/L  Final   • Total Bilirubin 01/06/2021 0.6  0.1 - 1.5 mg/dL Final   • Albumin 01/06/2021 3.5  3.2 - 4.9 g/dL Final   • Total Protein 01/06/2021 6.4  6.0 - 8.2 g/dL Final   • Globulin 01/06/2021 2.9  1.9 - 3.5 g/dL Final   • A-G Ratio 01/06/2021 1.2  g/dL Final   • Hepatitis B Surface Antigen 01/06/2021 Non-Reactive  Non-Reactive Final    Comment: It has been reported that certain assays will not detect all HBV mutants.  If acute or chronic HBV infection is suspected and the HBsAg result is  negative, it is recommended that other serological markers be tested to  confirm the HBsAg nonreactivity.  HBsAg test results should always be  assessed in conjunction with the patient's medical history, clinical  examination, and other findings.  Note: Assay performance characteristics have not been established when the  [employer]ys HBsAg II assay is used in conjunction with other manufacturers'  assays for specific HBV serological markers. Assay performance  characteristics have not been established for testing of newborns.     • Hepatitis B Cors Ab,IgM 01/06/2021 Non-Reactive  Non-Reactive Final    Comment: IgM antibodies to HBc were not detected.  The Roche HBc IgM is a diagnostic test for the qualitative determination of  IgM antibodies to the hepatitis B core antigen in human serum and plasma.  The results should be used and interpreted only in the context of the  overall clinical picture. A negative test result does not exclude the  possibility of exposure to hepatitis B virus.  Note: Assay performance characteristics have not been established in  patients under the age of 21, pregnant women, or in populations of  immunocompromised or immunosuppressed patients.     • Hepatitis A Virus Ab, IgM 01/06/2021 Non-Reactive  Non-Reactive Final    Comment: The Roche HAV IgM assay is a diagnostic immunoassay for the qualitative  determination of IgM response to the hepatitis A virus in human serum and  plasma. The results should be used  and interpreted only in the context of  the overall clinical picture. A negative test result does not exclude the  possibility of exposure to hepatitis A virus.  Note: Assay performance characteristics have not been established for  immunocompromised or immunosuppressed patients.     • Hepatitis C Antibody 01/06/2021 Non-Reactive  Non-Reactive Final    Comment: Antibodies to HCV were not detected.  The Roche anti-HCV is a diagnostic test for the qualitative determination of  antibodies to the hepatitis C virus in human serum and plasma. The results  should be used and interpreted only in the context of the overall clinical  picture. A negative test result does not exclude the possibility of exposure  to hepatitis C virus.  Note: Assay performance characteristics have not been established in  populations of immunocompromised or immunosuppressed patients.     • GFR If  01/06/2021 >60  >60 mL/min/1.73 m 2 Final   • GFR If Non  01/06/2021 >60  >60 mL/min/1.73 m 2 Final       Imaging:   No results found.       Assessment & Plan:  50-year-old male with an unprovoked bilateral pulmonary embolism.   Elevated liver function test suggestive of hepatitis of unclear etiology  Mild splenomegaly of unclear etiology    Comment and recommendation:  The attribution of the pulmonary embolism is provoked or secondary to COVID-19 but sedation is a challenging one.  In the original trial testing the Moderna vaccine, there were 5 event of pulmonary embolism in the placebo (15,166) and for in the m-RNA 1273 (15,185) which is not supportive of the idea that COVID-19 immunization will be a significant risk factor for pulmonary embolism see table S10 of the appendix 2/4/2021 [facility]. However, the time relationship and the presence of severe headache which is a more common complication in the immunization arm rather than in the placebo arm (20 vs 12) somewhat suggest that the patient may have had an abnormal  reaction to the vaccine.  Nevertheless will be risky to classify this pulmonary embolism as a secondary to the vaccine and think that after 3 months or 6 months of anticoagulation the risk of a recurrent pulmonary embolus will be equal to the average population.  In male the risk of recurrence is higher than female and the strategy of obtaining D-dimer after discontinuing anticoagulation for 1 month is not advised by the American College of physician guideline and or by expert opinion.  Thrombophilia testing in order to select patient for long-term or in definitive anticoagulation is not appropriate and misleading by enlarge unless we are dealing with an autosomal dominant disorder in a patient below age of 35.    I proposed to the patient to get a second opinion at [place] to make sure that an expert in thromboembolic disorder has the same opinion and approach that we are following.     At this point I advise in definitive anticoagulation after the first 3 months  With the option of decreasing apixaban (Eliquis) to 2.5 mg twice daily based on the results of the amplifyext study [Yao Cohen [facility] 2/21/2013]    Meanwhile I will proceed to work-up the elevation liver function  Considering the possibility autoimmune hepatitis with associated risk of antiphospholipid antibody, Lupus anticoagulant and antinuclear antibody  And screen for hemochromatosis  I will also obtain an ultrasound of the liver to look for parenchymal abnormality  And I will also obtain a JAK2 reflex due to the presence of splenomegaly.    We spent over an hour reviewing the record counseling the patient which was present here with his wife and completing medical record  Follow-up will be in 6 weeks      he agreed and verbalized his agreement and understanding with the current plan. I answered all questions and concerns he has at this time and advised him to call at any time in the interim with questions or concerns in regards to his care.      Thank you for allowing me to participate in his care, I will continue to follow.

## 2021-03-02 ENCOUNTER — OFFICE VISIT (OUTPATIENT)
Dept: HEMATOLOGY ONCOLOGY | Facility: MEDICAL CENTER | Age: 51
End: 2021-03-02
Payer: COMMERCIAL

## 2021-03-02 VITALS
SYSTOLIC BLOOD PRESSURE: 142 MMHG | HEART RATE: 66 BPM | TEMPERATURE: 96.9 F | OXYGEN SATURATION: 96 % | BODY MASS INDEX: 31.5 KG/M2 | WEIGHT: 224.98 LBS | RESPIRATION RATE: 14 BRPM | HEIGHT: 71 IN | DIASTOLIC BLOOD PRESSURE: 82 MMHG

## 2021-03-02 DIAGNOSIS — R16.1 SPLENOMEGALY: ICD-10-CM

## 2021-03-02 DIAGNOSIS — I26.99 BILATERAL PULMONARY EMBOLISM (HCC): ICD-10-CM

## 2021-03-02 DIAGNOSIS — K75.9 HEPATITIS: ICD-10-CM

## 2021-03-02 PROCEDURE — 99205 OFFICE O/P NEW HI 60 MIN: CPT | Performed by: INTERNAL MEDICINE

## 2021-03-02 ASSESSMENT — FIBROSIS 4 INDEX: FIB4 SCORE: 1.85

## 2021-03-02 ASSESSMENT — PAIN SCALES - GENERAL: PAINLEVEL: NO PAIN

## 2021-03-10 ENCOUNTER — TELEPHONE (OUTPATIENT)
Dept: HEMATOLOGY ONCOLOGY | Facility: MEDICAL CENTER | Age: 51
End: 2021-03-10

## 2021-03-10 NOTE — TELEPHONE ENCOUNTER
Tarsha from [facility] Referral process called and stated that the referral to [place] his insurance does not cover it is out of network. What is in Network is [facility] Dr. Noel Burton and Dr. Carlos Enrique Carrero, they are booked out for new patients until July so he would not be able to be seen until July but if we place the new referral Urgent he could possibility be seen sooner. The New pt care johan In [place] number if we have any questions is [phone].   Tarsha number if we have any questions is [phone]

## 2021-03-12 NOTE — TELEPHONE ENCOUNTER
Got a call from Tarsha who asked if Dr. Cooper was fine with re-routing referral to [place] or if pt will need to be seen at [place]. Informed her I will call her back Monday.

## 2021-03-15 ENCOUNTER — TELEPHONE (OUTPATIENT)
Dept: HEMATOLOGY ONCOLOGY | Facility: MEDICAL CENTER | Age: 51
End: 2021-03-15

## 2021-03-15 DIAGNOSIS — I26.99 BILATERAL PULMONARY EMBOLISM (HCC): ICD-10-CM

## 2021-04-02 ENCOUNTER — HOSPITAL ENCOUNTER (OUTPATIENT)
Dept: LAB | Facility: MEDICAL CENTER | Age: 51
End: 2021-04-02
Attending: INTERNAL MEDICINE
Payer: COMMERCIAL

## 2021-04-02 ENCOUNTER — HOSPITAL ENCOUNTER (OUTPATIENT)
Dept: LAB | Facility: MEDICAL CENTER | Age: 51
End: 2021-04-02
Attending: STUDENT IN AN ORGANIZED HEALTH CARE EDUCATION/TRAINING PROGRAM
Payer: COMMERCIAL

## 2021-04-02 DIAGNOSIS — R16.1 SPLENOMEGALY: ICD-10-CM

## 2021-04-02 DIAGNOSIS — I26.99 BILATERAL PULMONARY EMBOLISM (HCC): ICD-10-CM

## 2021-04-02 DIAGNOSIS — R79.89 ELEVATED LFTS: ICD-10-CM

## 2021-04-02 DIAGNOSIS — E78.5 HYPERLIPIDEMIA, UNSPECIFIED HYPERLIPIDEMIA TYPE: ICD-10-CM

## 2021-04-02 DIAGNOSIS — K75.9 HEPATITIS: ICD-10-CM

## 2021-04-02 PROCEDURE — 86038 ANTINUCLEAR ANTIBODIES: CPT

## 2021-04-02 PROCEDURE — 83540 ASSAY OF IRON: CPT

## 2021-04-02 PROCEDURE — 80061 LIPID PANEL: CPT

## 2021-04-02 PROCEDURE — 36415 COLL VENOUS BLD VENIPUNCTURE: CPT

## 2021-04-02 PROCEDURE — 82728 ASSAY OF FERRITIN: CPT

## 2021-04-02 PROCEDURE — 81338 MPL GENE COMMON VARIANTS: CPT

## 2021-04-02 PROCEDURE — 81219 CALR GENE COM VARIANTS: CPT

## 2021-04-02 PROCEDURE — 86147 CARDIOLIPIN ANTIBODY EA IG: CPT | Mod: 91

## 2021-04-02 PROCEDURE — 86146 BETA-2 GLYCOPROTEIN ANTIBODY: CPT | Mod: 91

## 2021-04-02 PROCEDURE — 81270 JAK2 GENE: CPT

## 2021-04-02 PROCEDURE — 83550 IRON BINDING TEST: CPT

## 2021-04-02 PROCEDURE — 80053 COMPREHEN METABOLIC PANEL: CPT

## 2021-04-03 LAB
ALBUMIN SERPL BCP-MCNC: 4.3 G/DL (ref 3.2–4.9)
ALBUMIN/GLOB SERPL: 1.6 G/DL
ALP SERPL-CCNC: 68 U/L (ref 30–99)
ALT SERPL-CCNC: 38 U/L (ref 2–50)
ANION GAP SERPL CALC-SCNC: 11 MMOL/L (ref 7–16)
AST SERPL-CCNC: 22 U/L (ref 12–45)
BILIRUB SERPL-MCNC: 1.3 MG/DL (ref 0.1–1.5)
BUN SERPL-MCNC: 14 MG/DL (ref 8–22)
CALCIUM SERPL-MCNC: 8.9 MG/DL (ref 8.5–10.5)
CHLORIDE SERPL-SCNC: 107 MMOL/L (ref 96–112)
CHOLEST SERPL-MCNC: 258 MG/DL (ref 100–199)
CO2 SERPL-SCNC: 22 MMOL/L (ref 20–33)
CREAT SERPL-MCNC: 0.95 MG/DL (ref 0.5–1.4)
FASTING STATUS PATIENT QL REPORTED: NORMAL
FERRITIN SERPL-MCNC: 151 NG/ML (ref 22–322)
GLOBULIN SER CALC-MCNC: 2.7 G/DL (ref 1.9–3.5)
GLUCOSE SERPL-MCNC: 85 MG/DL (ref 65–99)
HDLC SERPL-MCNC: 48 MG/DL
IRON SATN MFR SERPL: 51 % (ref 15–55)
IRON SERPL-MCNC: 156 UG/DL (ref 50–180)
LDLC SERPL CALC-MCNC: 186 MG/DL
POTASSIUM SERPL-SCNC: 4 MMOL/L (ref 3.6–5.5)
PROT SERPL-MCNC: 7 G/DL (ref 6–8.2)
SODIUM SERPL-SCNC: 140 MMOL/L (ref 135–145)
TIBC SERPL-MCNC: 306 UG/DL (ref 250–450)
TRIGL SERPL-MCNC: 122 MG/DL (ref 0–149)
UIBC SERPL-MCNC: 150 UG/DL (ref 110–370)

## 2021-04-05 LAB
B2 GLYCOPROT1 IGA SER-ACNC: 4 SAU (ref 0–20)
B2 GLYCOPROT1 IGG SERPL IA-ACNC: 0 SGU (ref 0–20)
B2 GLYCOPROT1 IGM SERPL IA-ACNC: 3 SMU (ref 0–20)
CARDIOLIPIN IGA SER IA-ACNC: 3 APL (ref 0–11)
CARDIOLIPIN IGG SER IA-ACNC: 4 GPL (ref 0–14)
CARDIOLIPIN IGM SER IA-ACNC: 2 MPL (ref 0–12)
NUCLEAR IGG SER QL IA: NORMAL

## 2021-04-06 ENCOUNTER — HOSPITAL ENCOUNTER (OUTPATIENT)
Dept: RADIOLOGY | Facility: MEDICAL CENTER | Age: 51
End: 2021-04-06
Attending: INTERNAL MEDICINE
Payer: COMMERCIAL

## 2021-04-06 DIAGNOSIS — R16.1 SPLENOMEGALY: ICD-10-CM

## 2021-04-06 DIAGNOSIS — K75.9 HEPATITIS: ICD-10-CM

## 2021-04-06 PROCEDURE — 76700 US EXAM ABDOM COMPLETE: CPT

## 2021-04-07 LAB — JAK2 P.V617F BLD/T QL: NOT DETECTED

## 2021-04-11 ENCOUNTER — TELEMEDICINE (OUTPATIENT)
Dept: TELEHEALTH | Facility: TELEMEDICINE | Age: 51
End: 2021-04-11
Payer: COMMERCIAL

## 2021-04-11 DIAGNOSIS — I26.99 ACUTE PULMONARY EMBOLISM WITHOUT ACUTE COR PULMONALE, UNSPECIFIED PULMONARY EMBOLISM TYPE (HCC): ICD-10-CM

## 2021-04-11 LAB — GENE XXX MUT ANL BLD/T: NOT DETECTED

## 2021-04-11 PROCEDURE — 99213 OFFICE O/P EST LOW 20 MIN: CPT | Mod: 95,CR | Performed by: FAMILY MEDICINE

## 2021-04-11 ASSESSMENT — ENCOUNTER SYMPTOMS
SORE THROAT: 0
FEVER: 0
VOMITING: 0
COUGH: 0

## 2021-04-12 ASSESSMENT — ENCOUNTER SYMPTOMS
DEPRESSION: 0
CONSTIPATION: 0
BLURRED VISION: 0
COUGH: 0
DIARRHEA: 0
MYALGIAS: 0
FEVER: 0
DIAPHORESIS: 0
NAUSEA: 0
INSOMNIA: 0
BRUISES/BLEEDS EASILY: 0
HEADACHES: 0
SHORTNESS OF BREATH: 0
WEIGHT LOSS: 0
DOUBLE VISION: 0
DIZZINESS: 0
NERVOUS/ANXIOUS: 0
SENSORY CHANGE: 0
CHILLS: 0
VOMITING: 0
TINGLING: 0
SPUTUM PRODUCTION: 0
PHOTOPHOBIA: 0
WHEEZING: 0

## 2021-04-12 NOTE — PROGRESS NOTES
Follow Up Note:  Hematology/Oncology      Primary Care:  Margarito Adan M.D.    Diagnosis: Pulmonary embolism    Chief Complaint:    History of Presenting Illness:  Prakash Kaur is a 50 y.o. male presented to medical attention with mild cough after being vaccinated for COVID-19 with Moderna on December 30, 2020.  In the next few days he developed pleuritic chest pain and was diagnosed with a bilateral PE on January 4, 2021.  A D-dimer on 1/3/2021 was elevated at 1.9.  A Covid test on 1/3/21 was negative.  During hospitalization the patient experienced severe headache and increased blood pressure.  A CT of the head was performed and was unrevealing 1/6/21.  There are segmental and subsegmental bilateral lower lobe pulmonary emboli. RV:LV ratio is normal. Central pulmonary arteries are normal in   caliber. Heart size is normal. No pericardial effusion. Aorta is normal in caliber without aneurysm or dissection.  Spleen is borderline enlarged  January 5 CBC showing hemoglobin of 13.9 with an MCV of 90 and a platelet count 185,000  The patient is notable to have an elevation of the LFTs on 2 occasion with a nonreactive screening for hepatitis A, B, and C.  Ultrasound was negative for DVT of the lower extremity  There was moderate dilation of the right ventricle  The left ventricular ejection fraction was 55%  There was no mention of pulmonary hypertension    Interval History:  Patient is here for follow up visit and to review the work-up including iron study JAK2 reflex antiphospholipid antibody abdominal ultrasound.  The patient denies any shortness of breath diaphoresis dizziness syncope.   His concern about the risk of bleeding related to his active duty as a .    Past Medical History:   Diagnosis Date   • Allergic rhinitis    • Anesthesia     doesn't do well with novacaine/lidocaine, ponv   • Arthritis    • Asthma    • Asthma    • Eosinophilic esophagitis 2011    Per GI Biopsy   • GERD  (gastroesophageal reflux disease)    • History of pulmonary embolus (PE) 01/2021   • Indigestion        Allergies as of 04/13/2021 - Reviewed 04/13/2021   Allergen Reaction Noted   • Nkda [no known drug allergy]  11/30/2011         Current Outpatient Medications:   •  apixaban (ELIQUIS) 5mg Tab, Take 1 tablet by mouth 2 times a day., Disp: 60 tablet, Rfl: 0  •  amLODIPine (NORVASC) 5 MG Tab, Take 1 Tab by mouth every day., Disp: 90 Tab, Rfl: 3  •  Mometasone Furo-Formoterol Fum (DULERA) 100-5 MCG/ACT Aerosol, Inhale 2 Puffs every day., Disp: , Rfl:   •  omeprazole (PRILOSEC) 20 MG delayed-release capsule, Take 20 mg by mouth every day., Disp: , Rfl:   •  albuterol (VENTOLIN OR PROVENTIL) 108 (90 BASE) MCG/ACT Aero Soln inhalation aerosol, Inhale 2 Puffs by mouth every four hours as needed for Shortness of Breath., Disp: 8.5 g, Rfl: 3  •  cetirizine (ZYRTEC) 10 MG TABS, Take 10 mg by mouth every day., Disp: , Rfl:       Review of Systems:  Review of Systems   Constitutional: Negative for chills, diaphoresis, fever, malaise/fatigue and weight loss.   HENT: Negative for nosebleeds and tinnitus.    Eyes: Negative for blurred vision, double vision and photophobia.   Respiratory: Negative for cough, sputum production, shortness of breath and wheezing.    Cardiovascular: Negative for chest pain and leg swelling.   Gastrointestinal: Negative for constipation, diarrhea, nausea and vomiting.   Genitourinary: Negative for dysuria.   Musculoskeletal: Negative for joint pain and myalgias.   Skin: Negative for rash.   Neurological: Negative for dizziness, tingling, sensory change and headaches.   Endo/Heme/Allergies: Does not bruise/bleed easily.   Psychiatric/Behavioral: Negative for depression. The patient is not nervous/anxious and does not have insomnia.          Physical Exam:  Vitals:    04/13/21 0858   BP: 126/84   BP Location: Right arm   Patient Position: Sitting   BP Cuff Size: Adult   Pulse: 76   Resp: 16   Temp: 36.4  "°C (97.6 °F)   TempSrc: Temporal   SpO2: 96%   Weight: 101 kg (223 lb 8.7 oz)   Height: 1.803 m (5' 11\")       Physical Exam  Vitals reviewed.   Constitutional:       General: He is not in acute distress.  HENT:      Head: Normocephalic.      Mouth/Throat:      Mouth: Mucous membranes are moist.   Eyes:      Extraocular Movements: Extraocular movements intact.      Pupils: Pupils are equal, round, and reactive to light.   Cardiovascular:      Rate and Rhythm: Normal rate and regular rhythm.      Pulses: Normal pulses.      Heart sounds: Normal heart sounds.   Pulmonary:      Effort: Pulmonary effort is normal.      Breath sounds: Normal breath sounds.   Abdominal:      General: Bowel sounds are normal.      Palpations: Abdomen is soft.      Tenderness: There is no abdominal tenderness.   Musculoskeletal:         General: Normal range of motion.      Cervical back: Normal range of motion and neck supple.   Lymphadenopathy:      Head:      Right side of head: No submental, submandibular, tonsillar, preauricular, posterior auricular or occipital adenopathy.      Left side of head: No submental, submandibular, tonsillar, preauricular, posterior auricular or occipital adenopathy.      Cervical: No cervical adenopathy.      Right cervical: No superficial, deep or posterior cervical adenopathy.     Left cervical: No superficial, deep or posterior cervical adenopathy.      Upper Body:      Right upper body: No supraclavicular, axillary, pectoral or epitrochlear adenopathy.      Left upper body: No supraclavicular, axillary, pectoral or epitrochlear adenopathy.      Lower Body: No right inguinal adenopathy. No left inguinal adenopathy.   Skin:     General: Skin is warm and dry.   Neurological:      General: No focal deficit present.      Mental Status: He is alert and oriented to person, place, and time.   Psychiatric:         Mood and Affect: Mood normal.           Labs:  No visits with results within 1 Day(s) from this " visit.   Latest known visit with results is:   Hospital Outpatient Visit on 04/02/2021   Component Date Value Ref Range Status   • Sodium 04/02/2021 140  135 - 145 mmol/L Final   • Potassium 04/02/2021 4.0  3.6 - 5.5 mmol/L Final   • Chloride 04/02/2021 107  96 - 112 mmol/L Final   • Co2 04/02/2021 22  20 - 33 mmol/L Final   • Anion Gap 04/02/2021 11.0  7.0 - 16.0 Final   • Glucose 04/02/2021 85  65 - 99 mg/dL Final   • Bun 04/02/2021 14  8 - 22 mg/dL Final   • Creatinine 04/02/2021 0.95  0.50 - 1.40 mg/dL Final   • Calcium 04/02/2021 8.9  8.5 - 10.5 mg/dL Final   • AST(SGOT) 04/02/2021 22  12 - 45 U/L Final   • ALT(SGPT) 04/02/2021 38  2 - 50 U/L Final   • Alkaline Phosphatase 04/02/2021 68  30 - 99 U/L Final   • Total Bilirubin 04/02/2021 1.3  0.1 - 1.5 mg/dL Final   • Albumin 04/02/2021 4.3  3.2 - 4.9 g/dL Final   • Total Protein 04/02/2021 7.0  6.0 - 8.2 g/dL Final   • Globulin 04/02/2021 2.7  1.9 - 3.5 g/dL Final   • A-G Ratio 04/02/2021 1.6  g/dL Final   • Cholesterol,Tot 04/02/2021 258* 100 - 199 mg/dL Final   • Triglycerides 04/02/2021 122  0 - 149 mg/dL Final   • HDL 04/02/2021 48  >=40 mg/dL Final   • LDL 04/02/2021 186* <100 mg/dL Final   • Fasting Status 04/02/2021 Fasting   Final   • GFR If  04/02/2021 >60  >60 mL/min/1.73 m 2 Final   • GFR If Non  04/02/2021 >60  >60 mL/min/1.73 m 2 Final       Imaging:   US-ABDOMEN COMPLETE SURVEY    Result Date: 4/6/2021 4/6/2021 8:29 AM HISTORY/REASON FOR EXAM: Elevated liver function test TECHNIQUE/EXAM DESCRIPTION AND NUMBER OF VIEWS:  Complete abdomen survey. COMPARISON: None FINDINGS: The liver is normal in contour. Echotexture appears mildly increased. There is no evidence of solid mass lesion. The liver measures 15.04 cm. The gallbladder is normal. There is no evidence of cholelithiasis. The gallbladder wall thickness measures 0.26 cm There is no pericholecystic fluid. The common duct measures 0.41 cm. The visualized pancreas  is unremarkable. The visualized aorta is normal in caliber. Intrahepatic IVC is patent. The portal vein is patent with hepatopetal flow. The MPV measures 0.8 cm. The right kidney measures 10.86 cm. The left kidney measures 11.41 cm. There is no hydronephrosis. The spleen measures 12.74 cm maximally. The bladder demonstrates no focal wall abnormality. There is no ascites.     1.  Mild increased hepatic echotexture is suggestive of fatty infiltration. 2.  No sonographic evidence for biliary obstruction.         Assessment & Plan:  1. Bilateral pulmonary embolism (HCC)  Heparin Induced Platelet PF4 IgG   2. Splenomegaly     3. Cold intolerance  TSH+FREE T4   JAK2 reflex and antiphospholipid antibody panel negative  Lupus anticoagulant not done  Antinuclear antibody negative  Iron studies within normal limit  We will obtain TSH and free T4  In view of the recent report from the [employer] concerning the association between anti-PF4 antibody and thromboembolic complication related to COVID-19 immunization, will obtain an anti-PF4 antibody with the understanding that at this point is being more than 3 months from the original event and even in the setting of heparin-induced thrombocytopenia the antibody could have been clear by now.  The patient is going for second opinion [facility] hematology to determine whether should be on anticoagulation for 3 to 6-month or indefinite.  We will generate a letter recommending that the patient be on light duty and avoid the risk related to is activity as a  which could be exposing him into the risk of the head trauma which in the setting of therapeutic anticoagulation may lead to a significant increased risk of intracranial bleeding.  Return in 3months we will reach out to the patient with the test results if there is any concern  he agreed and verbalized his agreement and understanding with the current plan. I answered all questions and  concerns he has at this time and advised him to call at any time in the interim with questions or concerns in regards to his care.       Please note that this dictation was created using voice recognition software. I have made every reasonable attempt to correct obvious errors, but I expect that there are errors of grammar and possibly content that I did not discover before finalizing the note.      Thank you for allowing me to participate in his care, I will continue to follow.

## 2021-04-13 ENCOUNTER — OFFICE VISIT (OUTPATIENT)
Dept: HEMATOLOGY ONCOLOGY | Facility: MEDICAL CENTER | Age: 51
End: 2021-04-13
Payer: COMMERCIAL

## 2021-04-13 VITALS
BODY MASS INDEX: 31.3 KG/M2 | HEART RATE: 76 BPM | DIASTOLIC BLOOD PRESSURE: 84 MMHG | HEIGHT: 71 IN | SYSTOLIC BLOOD PRESSURE: 126 MMHG | WEIGHT: 223.55 LBS | TEMPERATURE: 97.6 F | RESPIRATION RATE: 16 BRPM | OXYGEN SATURATION: 96 %

## 2021-04-13 DIAGNOSIS — R16.1 SPLENOMEGALY: ICD-10-CM

## 2021-04-13 DIAGNOSIS — I26.99 BILATERAL PULMONARY EMBOLISM (HCC): ICD-10-CM

## 2021-04-13 DIAGNOSIS — R68.89 COLD INTOLERANCE: ICD-10-CM

## 2021-04-13 PROCEDURE — 99214 OFFICE O/P EST MOD 30 MIN: CPT | Performed by: INTERNAL MEDICINE

## 2021-04-13 ASSESSMENT — FIBROSIS 4 INDEX: FIB4 SCORE: 0.89

## 2021-04-13 ASSESSMENT — PAIN SCALES - GENERAL: PAINLEVEL: NO PAIN

## 2021-04-13 NOTE — LETTER
April 13, 2021        Prakash Kaur is a patient here at [facility].     During extensive visits with Prakash and in reviewing his history with long term anticoagulation, there are increased risks for intercranial bleeding. Therefore,  I strongly recommend he be placed on light duty to lessen the risk of significant trauma.                         Siva Cooper MD

## 2021-04-14 ENCOUNTER — OFFICE VISIT (OUTPATIENT)
Dept: MEDICAL GROUP | Facility: PHYSICIAN GROUP | Age: 51
End: 2021-04-14
Payer: COMMERCIAL

## 2021-04-14 VITALS
HEART RATE: 69 BPM | HEIGHT: 70 IN | TEMPERATURE: 97.9 F | WEIGHT: 222 LBS | OXYGEN SATURATION: 99 % | SYSTOLIC BLOOD PRESSURE: 122 MMHG | DIASTOLIC BLOOD PRESSURE: 78 MMHG | BODY MASS INDEX: 31.78 KG/M2

## 2021-04-14 DIAGNOSIS — I10 ESSENTIAL HYPERTENSION: ICD-10-CM

## 2021-04-14 DIAGNOSIS — R79.89 ELEVATED LFTS: ICD-10-CM

## 2021-04-14 DIAGNOSIS — Z79.899 MEDICATION MANAGEMENT: ICD-10-CM

## 2021-04-14 DIAGNOSIS — Z86.711 HX PULMONARY EMBOLISM: ICD-10-CM

## 2021-04-14 LAB — MPL P.W515 BLD/T QL: NOT DETECTED

## 2021-04-14 PROCEDURE — 99214 OFFICE O/P EST MOD 30 MIN: CPT | Performed by: STUDENT IN AN ORGANIZED HEALTH CARE EDUCATION/TRAINING PROGRAM

## 2021-04-14 RX ORDER — AMLODIPINE BESYLATE 5 MG/1
5 TABLET ORAL DAILY
Qty: 90 TABLET | Refills: 2 | Status: SHIPPED
Start: 2021-04-14 | End: 2021-07-29 | Stop reason: SINTOL

## 2021-04-14 ASSESSMENT — FIBROSIS 4 INDEX: FIB4 SCORE: 0.89

## 2021-04-14 NOTE — ASSESSMENT & PLAN NOTE
Patient notes getting the Covid vaccine on December 30, and then starting to have mild cough on the 31st.  It progressively got worse over the next few days, developed pleuritic chest pain, and went to the hospital on 1/4/2021, where he was diagnosed with bilateral PE.    He was monitored in the hospital, and then sent home on Eliquis, with recommendations for heme-onc evaluation.  Have been working him up for coagulative causes, but without specific findings at this point.  They are currently continuing him on Eliquis.  He is going to follow-up with [facility], for further evaluation in the future.  Additionally, they currently have him on light duty, because he is a , there is the potential for head trauma during routine duties, therefore he is provided a letter by Hem/Onc, for restricted duty, since he is on anticoagulation.  - Hem/Onc to continue to manage.

## 2021-04-14 NOTE — PATIENT INSTRUCTIONS
Please continue to follow-up with your specialists.     Please return in about 6 months.     (Call in a few months, to schedule).   Please get the labs done a week prior to your next visit.  Please get them done while fasting (no food, coffee, or juices, for 8 hours - but water is ok).     Thank you.

## 2021-04-14 NOTE — PROGRESS NOTES
Established Patient     Prakash Kaur is a 50 y.o. male.    Chief Complaint   Patient presents with   • Results     lab review               Problems addressed this visit:     This note uses problem-based documentation.  Subjective HPI is included in Assessment & Plan, at bottom of note.   Problem   Essential Hypertension   Elevated Lfts   Pulmonary Embolism (Hcc)                            Past Medical History:   Diagnosis Date   • Allergic rhinitis    • Anesthesia     doesn't do well with novacaine/lidocaine, ponv   • Arthritis    • Asthma    • Asthma    • Eosinophilic esophagitis     Per GI Biopsy   • GERD (gastroesophageal reflux disease)    • History of pulmonary embolus (PE) 2021   • Indigestion        Patient Active Problem List   Diagnosis   • Allergic rhinitis   • Allergic asthma   • Eosinophilic esophagitis   • GERD (gastroesophageal reflux disease)   • Pulmonary embolism (HCC)   • Essential hypertension   • Elevated LFTs   • HLD (hyperlipidemia)   • Skin lesion of face       Past Surgical History:   Procedure Laterality Date   • INGUINAL HERNIA LAPAROSCOPIC  2012    Performed by ROLAND POLLOCK at SURGERY [facility] ORS   • INGUINAL HERNIA REPAIR  2011    Performed by ROLAND POLLOCK at SURGERY [facility] ORS   • EGD WITH ASP/BX  10/11    Dr Brice.  BX negative for Piper's       Family History   Problem Relation Age of Onset   • Heart Disease Mother    • Hypertension Father    • Stroke Paternal Uncle    • Heart Disease Maternal Grandfather          of heart attack at 55       Social History     Tobacco Use   • Smoking status: Never Smoker   • Smokeless tobacco: Never Used   Substance Use Topics   • Alcohol use: Yes     Comment: often 2/d, but sometimes 4/d on weekends. -       Current Outpatient Medications   Medication Sig Dispense Refill   • amLODIPine (NORVASC) 5 MG Tab Take 1 tablet by mouth every day. 90 tablet 2   • apixaban (ELIQUIS) 5mg Tab Take 1 tablet by mouth  "2 times a day. 60 tablet 0   • Mometasone Furo-Formoterol Fum (DULERA) 100-5 MCG/ACT Aerosol Inhale 2 Puffs every day.     • omeprazole (PRILOSEC) 20 MG delayed-release capsule Take 20 mg by mouth every day.     • albuterol (VENTOLIN OR PROVENTIL) 108 (90 BASE) MCG/ACT Aero Soln inhalation aerosol Inhale 2 Puffs by mouth every four hours as needed for Shortness of Breath. 8.5 g 3   • cetirizine (ZYRTEC) 10 MG TABS Take 10 mg by mouth every day.       No current facility-administered medications for this visit.       Allergies as of 04/14/2021 - Reviewed 04/14/2021   Allergen Reaction Noted   • Nkda [no known drug allergy]  11/30/2011                 Review of Symptoms   (as noted elsewhere, and .....)   GEN/CONST:   Denies fever, chills, fatigue,   CARDIO:   Denies chest pain, palpitations, or edema.    RESP:   Denies shortness of breath, wheezing, or coughing.  GI:    Denies nausea, vomiting, diarrhea,     :   Denies dysuria, hematuria,    MSK:  Denies joint pains  or muscle aches.    NEURO:  Denies numbness or focal weakness.       PSYCH:  Denies anxiety, depression, or substance abuse        Physical Exam  /78   Pulse 69   Temp 36.6 °C (97.9 °F)   Ht 1.778 m (5' 10\")   Wt 101 kg (222 lb)   SpO2 99%   BMI 31.85 kg/m²   General:  Alert and oriented, No apparent distress.    Lungs: Clear to auscultation bilaterally.  No wheezes or rales.  Cardiovascular: Regular rate and rhythm.  No murmurs, rubs or gallops.  Abdomen:  Soft.  Non-tender.  No rebound or guarding.   Extremities:  No pedal edema.  Good general motion of extremities.   Psychological: Appears to have normal mood and affect.        Labs:  Recent / Prior labs reviewed.    CBC, CMP and Lipids     US - noted possible fatty liver.                             Assessment & Plan  (with subjective history and orders):    Problem List Items Addressed This Visit     Elevated LFTs     Patient previously had pulmonary embolism, and at that time had " elevated ALT and alkaline phosphatase.  This may have been due to venous congestion, irritating the liver.  Previously, noted mild alcohol use, but possibly a bit more than he should, on weekends.  Since noting this change and his PEs, he has reduced alcohol and Tylenol.   … Since that time, his LFTs have improved somewhat.  Results for LAMBERTO KNOX (MRN [id number]) as of 4/14/2021 09:06   Ref. Range 1/6/2021 03:08 4/2/2021 12:11   AST(SGOT) Latest Ref Range: 12 - 45 U/L 86 (H) 22   ALT(SGPT) Latest Ref Range: 2 - 50 U/L 135 (H) 38   Alkaline Phosphatase Latest Ref Range: 30 - 99 U/L 139 (H) 68   Total Bilirubin Latest Ref Range: 0.1 - 1.5 mg/dL 0.6 1.3   … Heme-onc had also ordered ultrasound,   which also noted possible fatty liver.  - Can follow-up US at some point in the future.  - no acute change in management.          Essential hypertension     Patient was previously found to have elevated blood pressure, when being evaluated for his pulmonary embolisms.  He was started on amlodipine 5.  He has continued on this and has not noticed any side effect.  Denies:  Headache, dizziness, vision changes.  Denies:  CP, palpitations, or dyspnea.    At home, BP ~120-130s/70-80s.  In office: BP-122/78.  Recent normal BMP.  -Continue amlodipine 5.  -follow-up in 6 months.          Relevant Medications    amLODIPine (NORVASC) 5 MG Tab    Other Relevant Orders    Comp Metabolic Panel    Hx pulmonary embolism     Patient notes getting the Covid vaccine on December 30, and then starting to have mild cough on the 31st.  It progressively got worse over the next few days, developed pleuritic chest pain, and went to the hospital on 1/4/2021, where he was diagnosed with bilateral PE.    He was monitored in the hospital, and then sent home on Eliquis, with recommendations for heme-onc evaluation.  Have been working him up for coagulative causes, but without specific findings at this point.  They are currently continuing him on  Eliquis.  He is going to follow-up with [facility], for further evaluation in the future.  Additionally, they currently have him on light duty, because he is a , there is the potential for head trauma during routine duties, therefore he is provided a letter by Hem/Onc, for restricted duty, since he is on anticoagulation.  - Hem/Onc to continue to manage.          Relevant Orders    CBC WITH DIFFERENTIAL      Other Visit Diagnoses     Medication management        Relevant Orders    CBC WITH DIFFERENTIAL    Comp Metabolic Panel        Of note, the above list is not in a specific nor sortable order.                   Diagnosis Table, with orders:  1. Essential hypertension  amLODIPine (NORVASC) 5 MG Tab    Comp Metabolic Panel   2. Elevated LFTs     3. Hx pulmonary embolism  CBC WITH DIFFERENTIAL   4. Medication management  CBC WITH DIFFERENTIAL    Comp Metabolic Panel                 Follow-up:  6 months (CBC+CMP, before visit).               A computerized dictation system may have been used in this note.    Despite review, there may be some errors in spelling or grammar.    Margarito Adan M.D.  4/14/2021

## 2021-04-14 NOTE — ASSESSMENT & PLAN NOTE
Patient was previously found to have elevated blood pressure, when being evaluated for his pulmonary embolisms.  He was started on amlodipine 5.  He has continued on this and has not noticed any side effect.  Denies:  Headache, dizziness, vision changes.  Denies:  CP, palpitations, or dyspnea.    At home, BP ~120-130s/70-80s.  In office: BP-122/78.  Recent normal BMP.  -Continue amlodipine 5.  -follow-up in 6 months.

## 2021-04-14 NOTE — ASSESSMENT & PLAN NOTE
Patient previously had pulmonary embolism, and at that time had elevated ALT and alkaline phosphatase.  This may have been due to venous congestion, irritating the liver.  Previously, noted mild alcohol use, but possibly a bit more than he should, on weekends.  Since noting this change and his PEs, he has reduced alcohol and Tylenol.   … Since that time, his LFTs have improved somewhat.  Results for LAMBERTO KNOX (MRN [id number]) as of 4/14/2021 09:06   Ref. Range 1/6/2021 03:08 4/2/2021 12:11   AST(SGOT) Latest Ref Range: 12 - 45 U/L 86 (H) 22   ALT(SGPT) Latest Ref Range: 2 - 50 U/L 135 (H) 38   Alkaline Phosphatase Latest Ref Range: 30 - 99 U/L 139 (H) 68   Total Bilirubin Latest Ref Range: 0.1 - 1.5 mg/dL 0.6 1.3   … Heme-onc had also ordered ultrasound,   which also noted possible fatty liver.  - Can follow-up US at some point in the future.  - no acute change in management.

## 2021-07-14 ENCOUNTER — TELEPHONE (OUTPATIENT)
Dept: HEMATOLOGY ONCOLOGY | Facility: MEDICAL CENTER | Age: 51
End: 2021-07-14

## 2021-07-14 DIAGNOSIS — I26.99 BILATERAL PULMONARY EMBOLISM (HCC): ICD-10-CM

## 2021-07-14 NOTE — TELEPHONE ENCOUNTER
Raeann from [facility] called in regards to the patients referral. Raeann states Pt was supposed to see Dr. Noel Morse but is now going to see Dr. Semaj Ayala. Raeann called [facility] and was informed we would need to send a referral to them with the new provider attached to it. Informed Raeann I would relay the information to PATRICK Morgan. Raeann stated that the patient is scheduled for July 19th.   Phone: [phone]  Fax: [phone]

## 2021-07-29 ENCOUNTER — OFFICE VISIT (OUTPATIENT)
Dept: MEDICAL GROUP | Facility: PHYSICIAN GROUP | Age: 51
End: 2021-07-29
Payer: COMMERCIAL

## 2021-07-29 VITALS
WEIGHT: 222 LBS | DIASTOLIC BLOOD PRESSURE: 90 MMHG | SYSTOLIC BLOOD PRESSURE: 146 MMHG | TEMPERATURE: 96.7 F | HEIGHT: 70 IN | HEART RATE: 76 BPM | RESPIRATION RATE: 18 BRPM | BODY MASS INDEX: 31.78 KG/M2 | OXYGEN SATURATION: 97 %

## 2021-07-29 DIAGNOSIS — G47.9 SLEEP TROUBLE: ICD-10-CM

## 2021-07-29 DIAGNOSIS — I10 ESSENTIAL HYPERTENSION: ICD-10-CM

## 2021-07-29 PROBLEM — I26.99 PULMONARY EMBOLISM (HCC): Status: ACTIVE | Noted: 2021-07-19

## 2021-07-29 PROCEDURE — 99214 OFFICE O/P EST MOD 30 MIN: CPT | Performed by: STUDENT IN AN ORGANIZED HEALTH CARE EDUCATION/TRAINING PROGRAM

## 2021-07-29 RX ORDER — LOSARTAN POTASSIUM AND HYDROCHLOROTHIAZIDE 12.5; 5 MG/1; MG/1
1-2 TABLET ORAL DAILY
Qty: 90 TABLET | Refills: 1 | Status: SHIPPED | OUTPATIENT
Start: 2021-07-29 | End: 2021-09-14 | Stop reason: SDUPTHER

## 2021-07-29 ASSESSMENT — FIBROSIS 4 INDEX: FIB4 SCORE: 0.89

## 2021-07-29 NOTE — ASSESSMENT & PLAN NOTE
"Patient has also recently noted some difficulty sleeping, at night.  And when he wakes up, he feels very tired, difficult to wake up, and feels \"hung over\" but not having had any alcohol.  Does not recall if he snores, but will ask his wife in future.  Medically it was not elevated before.  -Advised patient on sleep hygiene.  -Advised on timed fan.  -We will get new CBC to check hematocrit, for follow-up.  "

## 2021-07-29 NOTE — ASSESSMENT & PLAN NOTE
Previously found to be hypertensive and started on amlodipine in the hospital, when he had his PEs.  However, ever he has recently noted some occasional leg swellings, predominantly in the evenings.  Also, in the office his blood pressure is still elevated-146/90, despite being on amlodipine 5.  On exam, no swelling to the legs, at this time, and no erythema or warmth.  -We will change medication.  -Stop amlodipine.  -Start losartan-hydrochlorothiazide (HYZAAR) 50-12.5 MG  ... If home BP remains >140/80,       then may increase to 2 tablets, instead of 1.  - new labs and follow-up in 4-5 weeks.

## 2021-07-29 NOTE — PATIENT INSTRUCTIONS
Please start the new medication:  - losartan-hydrochlorothiazide (HYZAAR) 50-12.5 MG per tablet;    Take 1-2 Tablets by mouth every day.    (Take 1 tab daily. ....  If BP consistently > 140/80, then take 2 tabs daily).      Please get the labs done about 1 week prior to next appointment (in about 4-5 weeks).   Thank you.

## 2021-07-29 NOTE — PROGRESS NOTES
Established Patient     Prakash Kaur is a 50 y.o. male.    Chief Complaint   Patient presents with   • Edema     bilaterial feet and ankle   • Sleep Problem             Problems addressed this visit:     This note uses problem-based documentation.  Subjective HPI is included in Assessment & Plan, at bottom of note.   Problem   Sleep Trouble   Pulmonary Embolism (Hcc)   Essential Hypertension                            Past Medical History:   Diagnosis Date   • Allergic rhinitis    • Anesthesia     doesn't do well with novacaine/lidocaine, ponv   • Arthritis    • Asthma    • Asthma    • Eosinophilic esophagitis     Per GI Biopsy   • GERD (gastroesophageal reflux disease)    • History of pulmonary embolus (PE) 2021   • Indigestion        Patient Active Problem List   Diagnosis   • Allergic rhinitis   • Allergic asthma   • Eosinophilic esophagitis   • GERD (gastroesophageal reflux disease)   • Hx pulmonary embolism   • Essential hypertension   • Elevated LFTs   • HLD (hyperlipidemia)   • Skin lesion of face   • Pulmonary embolism (HCC)   • Sleep trouble       Past Surgical History:   Procedure Laterality Date   • INGUINAL HERNIA LAPAROSCOPIC  2012    Performed by ROLAND POLLOCK at SURGERY [facility]   • INGUINAL HERNIA REPAIR  2011    Performed by ROLAND POLLOCK at SURGERY [facility]   • EGD WITH ASP/BX  10/11    Dr Brice.  BX negative for Piper's       Family History   Problem Relation Age of Onset   • Heart Disease Mother    • Hypertension Father    • Stroke Paternal Uncle    • Heart Disease Maternal Grandfather          of heart attack at 55       Social History     Tobacco Use   • Smoking status: Never Smoker   • Smokeless tobacco: Never Used   Substance Use Topics   • Alcohol use: Yes     Comment: often 2/d, but sometimes 4/d on weekends. -       Current medications:  (including new changes):  Current Outpatient Medications   Medication Sig Dispense Refill   •  "losartan-hydrochlorothiazide (HYZAAR) 50-12.5 MG per tablet Take 1-2 Tablets by mouth every day. (Take 1 tab daily.  If BP consistently > 140/80, then take 2 tabs daily). 90 tablet 1   • apixaban (ELIQUIS) 5mg Tab Take 1 tablet by mouth 2 times a day. 60 tablet 0   • Mometasone Furo-Formoterol Fum (DULERA) 100-5 MCG/ACT Aerosol Inhale 2 Puffs every day.     • omeprazole (PRILOSEC) 20 MG delayed-release capsule Take 20 mg by mouth every day.     • albuterol (VENTOLIN OR PROVENTIL) 108 (90 BASE) MCG/ACT Aero Soln inhalation aerosol Inhale 2 Puffs by mouth every four hours as needed for Shortness of Breath. 8.5 g 3   • cetirizine (ZYRTEC) 10 MG TABS Take 10 mg by mouth every day.       No current facility-administered medications for this visit.       Allergies as of 07/29/2021 - Reviewed 07/29/2021   Allergen Reaction Noted   • Nkda [no known drug allergy]  11/30/2011                   Review of Symptoms   (as noted elsewhere, and .....)   GEN/CONST:   Denies fever, chills,       + difficulty waking up, and morning fatigue.   CARDIO:   Denies chest pain, palpitations,       + bilat edema in legs, but only in evenings.      - no warmth, redness.... and symmetrical.   RESP:   Denies shortness of breath, wheezing, or coughing.  GI:    Denies nausea, vomiting, diarrhea,      MSK:  Denies joint pains  or muscle aches.    NEURO:  Denies numbness or focal weakness.      PSYCH:  Denies anxiety, depression,       Physical Exam  /90   Pulse 76   Temp 35.9 °C (96.7 °F) (Temporal)   Resp 18   Ht 1.778 m (5' 10\")   Wt 101 kg (222 lb)   SpO2 97%   BMI 31.85 kg/m²   General:  Alert and oriented, No apparent distress.    Lungs: Clear to auscultation bilaterally.  No wheezes or rales.  Cardiovascular: Regular rate and rhythm.  No murmurs, rubs or gallops.  Abdomen:  Soft.  Non-tender.  No rebound or guarding.   Extremities:   Good general motion of extremities.      - No pedal edema.  No warmth.  No redness. (currently) " "  Psychological: Appears to have normal mood and affect.        Labs:  Recent / Prior labs reviewed.    CMP and Lipids                             Assessment & Plan  (with subjective history and orders):  Of note, the list below is not in a specific nor sortable order.      Problem List Items Addressed This Visit     Essential hypertension     Previously found to be hypertensive and started on amlodipine in the hospital, when he had his PEs.  However, ever he has recently noted some occasional leg swellings, predominantly in the evenings.  Also, in the office his blood pressure is still elevated-146/90, despite being on amlodipine 5.  On exam, no swelling to the legs, at this time, and no erythema or warmth.  -We will change medication.  -Stop amlodipine.  -Start losartan-hydrochlorothiazide (HYZAAR) 50-12.5 MG  ... If home BP remains >140/80,       then may increase to 2 tablets, instead of 1.  - new labs and follow-up in 4-5 weeks.          Relevant Medications    losartan-hydrochlorothiazide (HYZAAR) 50-12.5 MG per tablet    Other Relevant Orders    Basic Metabolic Panel    Sleep trouble     Patient has also recently noted some difficulty sleeping, at night.  And when he wakes up, he feels very tired, difficult to wake up, and feels \"hung over\" but not having had any alcohol.  Does not recall if he snores, but will ask his wife in future.  Medically it was not elevated before.  -Advised patient on sleep hygiene.  -Advised on timed fan.  -We will get new CBC to check hematocrit, for follow-up.         Relevant Orders    CBC WITH DIFFERENTIAL                    Diagnosis Table, with orders:  1. Essential hypertension  losartan-hydrochlorothiazide (HYZAAR) 50-12.5 MG per tablet    Basic Metabolic Panel   2. Sleep trouble  CBC WITH DIFFERENTIAL                 Follow-up:  5 weeks  (HTN, labs, and check sleep issues)               A computerized dictation system may have been used in this note.    Despite review, " there may be some errors in spelling or grammar.    Margarito Adan M.D.  7/29/2021

## 2021-08-09 NOTE — TELEPHONE ENCOUNTER
Requested Prescriptions     Signed Prescriptions Disp Refills   • apixaban (ELIQUIS) 5mg Tab 60 tablet 0     Sig: Take 1 tablet by mouth 2 times a day.     Authorizing Provider: ANDREA BETANCOURT A.P.R.N.

## 2021-08-24 ENCOUNTER — HOSPITAL ENCOUNTER (OUTPATIENT)
Dept: LAB | Facility: MEDICAL CENTER | Age: 51
End: 2021-08-24
Attending: INTERNAL MEDICINE
Payer: COMMERCIAL

## 2021-08-24 ENCOUNTER — HOSPITAL ENCOUNTER (OUTPATIENT)
Dept: LAB | Facility: MEDICAL CENTER | Age: 51
End: 2021-08-24
Attending: STUDENT IN AN ORGANIZED HEALTH CARE EDUCATION/TRAINING PROGRAM
Payer: COMMERCIAL

## 2021-08-24 DIAGNOSIS — G47.9 SLEEP TROUBLE: ICD-10-CM

## 2021-08-24 DIAGNOSIS — I26.99 BILATERAL PULMONARY EMBOLISM (HCC): ICD-10-CM

## 2021-08-24 DIAGNOSIS — Z86.711 HX PULMONARY EMBOLISM: ICD-10-CM

## 2021-08-24 DIAGNOSIS — I10 ESSENTIAL HYPERTENSION: ICD-10-CM

## 2021-08-24 DIAGNOSIS — Z79.899 MEDICATION MANAGEMENT: ICD-10-CM

## 2021-08-24 LAB
ALBUMIN SERPL BCP-MCNC: 4.3 G/DL (ref 3.2–4.9)
ALBUMIN/GLOB SERPL: 1.5 G/DL
ALP SERPL-CCNC: 68 U/L (ref 30–99)
ALT SERPL-CCNC: 28 U/L (ref 2–50)
ANION GAP SERPL CALC-SCNC: 12 MMOL/L (ref 7–16)
AST SERPL-CCNC: 20 U/L (ref 12–45)
BASOPHILS # BLD AUTO: 0.9 % (ref 0–1.8)
BASOPHILS # BLD: 0.04 K/UL (ref 0–0.12)
BILIRUB SERPL-MCNC: 0.9 MG/DL (ref 0.1–1.5)
BUN SERPL-MCNC: 17 MG/DL (ref 8–22)
CALCIUM SERPL-MCNC: 9.5 MG/DL (ref 8.5–10.5)
CHLORIDE SERPL-SCNC: 102 MMOL/L (ref 96–112)
CO2 SERPL-SCNC: 24 MMOL/L (ref 20–33)
CREAT SERPL-MCNC: 1.07 MG/DL (ref 0.5–1.4)
EOSINOPHIL # BLD AUTO: 0.1 K/UL (ref 0–0.51)
EOSINOPHIL NFR BLD: 2.2 % (ref 0–6.9)
ERYTHROCYTE [DISTWIDTH] IN BLOOD BY AUTOMATED COUNT: 39.7 FL (ref 35.9–50)
GLOBULIN SER CALC-MCNC: 2.8 G/DL (ref 1.9–3.5)
GLUCOSE SERPL-MCNC: 96 MG/DL (ref 65–99)
HCT VFR BLD AUTO: 44.7 % (ref 42–52)
HGB BLD-MCNC: 15.7 G/DL (ref 14–18)
IMM GRANULOCYTES # BLD AUTO: 0.02 K/UL (ref 0–0.11)
IMM GRANULOCYTES NFR BLD AUTO: 0.4 % (ref 0–0.9)
LYMPHOCYTES # BLD AUTO: 1.52 K/UL (ref 1–4.8)
LYMPHOCYTES NFR BLD: 32.9 % (ref 22–41)
MCH RBC QN AUTO: 31.5 PG (ref 27–33)
MCHC RBC AUTO-ENTMCNC: 35.1 G/DL (ref 33.7–35.3)
MCV RBC AUTO: 89.6 FL (ref 81.4–97.8)
MONOCYTES # BLD AUTO: 0.27 K/UL (ref 0–0.85)
MONOCYTES NFR BLD AUTO: 5.8 % (ref 0–13.4)
NEUTROPHILS # BLD AUTO: 2.67 K/UL (ref 1.82–7.42)
NEUTROPHILS NFR BLD: 57.8 % (ref 44–72)
NRBC # BLD AUTO: 0 K/UL
NRBC BLD-RTO: 0 /100 WBC
PLATELET # BLD AUTO: 211 K/UL (ref 164–446)
PMV BLD AUTO: 10.9 FL (ref 9–12.9)
POTASSIUM SERPL-SCNC: 3.9 MMOL/L (ref 3.6–5.5)
PROT SERPL-MCNC: 7.1 G/DL (ref 6–8.2)
RBC # BLD AUTO: 4.99 M/UL (ref 4.7–6.1)
SODIUM SERPL-SCNC: 138 MMOL/L (ref 135–145)
T4 FREE SERPL-MCNC: 1.3 NG/DL (ref 0.93–1.7)
TSH SERPL DL<=0.005 MIU/L-ACNC: 1.05 UIU/ML (ref 0.38–5.33)
WBC # BLD AUTO: 4.6 K/UL (ref 4.8–10.8)

## 2021-08-24 PROCEDURE — 84443 ASSAY THYROID STIM HORMONE: CPT

## 2021-08-24 PROCEDURE — 85025 COMPLETE CBC W/AUTO DIFF WBC: CPT

## 2021-08-24 PROCEDURE — 84439 ASSAY OF FREE THYROXINE: CPT

## 2021-08-24 PROCEDURE — 80053 COMPREHEN METABOLIC PANEL: CPT

## 2021-08-24 PROCEDURE — 86022 PLATELET ANTIBODIES: CPT

## 2021-08-24 PROCEDURE — 36415 COLL VENOUS BLD VENIPUNCTURE: CPT

## 2021-08-25 LAB
PF4 HEPARIN CMPLX IGG SER-IMP: NEGATIVE
PF4 HEPARIN CMPLX IGG SERPL IA: 0.25 OD

## 2021-08-25 NOTE — PROGRESS NOTES
Subjective     Prakash Kaur is a 50 y.o. male who presents with Other (Bilateral pulmonary embolism 3mo fv with labs)            HPI   Mr. Kaur presents for evaluation of pulmonary embolism. He is accompanied by his wife for today's visit.    Patient referred 2/2021 per PCP for further evaluation of pulmonary embolism, dx 1/4/21 (unprovoked vs r/t COVID vaccination (Moderna) on 12/30/20). BLE US competed 1/4/21 was negative for DVT and he was initiated on Eliquis 5 mg BID. Patient was negative for COVID, ECHO showed mild dilatation of right ventricle, LVEF was 55%, no pulmonary hypertension, spleen borderline enlarged, mildly elevated liver enzymes, negative hepatitis screening. Hematology work-up was facilitated by Dr. Cooper and included: negative HIT PF4 antibody IgG; negative anticardiolipin and beta-2 glycoproteins; normal iron studies and ferritin; negative OSMANY; CALR exon 9 & MPL codon 515 mutations not detected. Ultrasound completed 4/6/2021 shows mildly echogenic liver, consistent with fatty liver, borderline splenomegaly. Full thrombophilia work-up remains limited as patient continues with anticoagulation. He did receive 2nd Moderna vaccine dose without issue.    Patient was referred for second opinion, completed at [facility], Dr. Semaj Ayala, 7/19/2021. Patient was advised to continue Eliquis until summer 2022, be reevaluated at that time for risk-benefit of continuing anticoagulation vs consider reducing dose to 2.5 mg twice daily.  Patient states that he desires to continue with care locally although he will return to the [facility] should we recommend it.    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 He notes mild cough and shortness of breath that correlates with recent poor local air quality and continues to slowly recover from PEs. His fitness and activity tolerance has not returned to  pre-PE baseline but he does note very slow improvement. Previously reported diaphoresis and BLE edema has resolved with change and blood pressure medication. Patient is otherwise at his baseline, denies bleeding, bruising, epistaxis, hematochezia, hematuria, and is otherwise essentially asymptomatic.        Review of Systems   Constitutional: Positive for diaphoresis (subsided after change in BP meds). Negative for chills, fever, malaise/fatigue and weight loss (stable).   Respiratory: Positive for cough (mild increase with recent air quality) and shortness of breath (mild increase with recent air quality; continues slow improvement r/t PE resolution). Negative for wheezing.    Cardiovascular: Positive for leg swelling (resolved with change in BP meds). Negative for chest pain and palpitations.   Gastrointestinal: Negative for blood in stool, constipation (Last BM last night), diarrhea, melena, nausea and vomiting.   Genitourinary: Negative for dysuria and hematuria.   Musculoskeletal: Positive for joint pain (consistent with baseline) and myalgias (consistent with baseline).   Neurological: Negative for dizziness, tingling and headaches.   Psychiatric/Behavioral: The patient does not have insomnia (better since BP med change).            Allergies   Allergen Reactions   • Nkda [No Known Drug Allergy]            Current Outpatient Medications on File Prior to Visit   Medication Sig Dispense Refill   • apixaban (ELIQUIS) 5mg Tab Take 1 tablet by mouth 2 times a day. 60 tablet 0   • losartan-hydrochlorothiazide (HYZAAR) 50-12.5 MG per tablet Take 1-2 Tablets by mouth every day. (Take 1 tab daily.  If BP consistently > 140/80, then take 2 tabs daily). 90 tablet 1   • Mometasone Furo-Formoterol Fum (DULERA) 100-5 MCG/ACT Aerosol Inhale 2 Puffs every day.     • omeprazole (PRILOSEC) 20 MG delayed-release capsule Take 20 mg by mouth every day.     • albuterol (VENTOLIN OR PROVENTIL) 108 (90 BASE) MCG/ACT Aero Soln  "inhalation aerosol Inhale 2 Puffs by mouth every four hours as needed for Shortness of Breath. 8.5 g 3   • cetirizine (ZYRTEC) 10 MG TABS Take 10 mg by mouth every day.       No current facility-administered medications on file prior to visit.           Objective     /68   Pulse 82   Temp 36.2 °C (97.2 °F) (Temporal)   Resp 16   Ht 1.623 m (5' 3.9\")   Wt 103 kg (226 lb 1.3 oz)   SpO2 98%   BMI 38.93 kg/m²      Physical Exam  Vitals reviewed.   Constitutional:       General: He is not in acute distress.     Appearance: He is well-developed. He is not diaphoretic.   HENT:      Head: Normocephalic and atraumatic.   Eyes:      General: No scleral icterus.        Right eye: No discharge.         Left eye: No discharge.   Cardiovascular:      Rate and Rhythm: Normal rate and regular rhythm.      Heart sounds: Normal heart sounds. No murmur heard.   No friction rub. No gallop.    Pulmonary:      Effort: Pulmonary effort is normal. No respiratory distress.      Breath sounds: Examination of the right-upper field reveals decreased breath sounds. Examination of the left-upper field reveals decreased breath sounds. Decreased breath sounds present. No wheezing.   Abdominal:      General: There is no distension.      Palpations: Abdomen is soft.      Tenderness: There is no abdominal tenderness.   Musculoskeletal:         General: Normal range of motion.      Cervical back: Normal range of motion.   Skin:     General: Skin is warm and dry.      Coloration: Skin is not pale.      Findings: No erythema or rash.   Neurological:      Mental Status: He is alert and oriented to person, place, and time.   Psychiatric:         Behavior: Behavior normal.                                                       US - Abdomen  4/6/2021 8:29 AM     HISTORY/REASON FOR EXAM: Elevated liver function test     TECHNIQUE/EXAM DESCRIPTION AND NUMBER OF VIEWS:  Complete abdomen survey.     COMPARISON: None     FINDINGS:  The liver is " normal in contour. Echotexture appears mildly increased. There is no evidence of solid mass lesion. The liver measures 15.04 cm.     The gallbladder is normal. There is no evidence of cholelithiasis. The gallbladder wall thickness measures 0.26 cm  There is no pericholecystic fluid.     The common duct measures 0.41 cm.     The visualized pancreas is unremarkable.     The visualized aorta is normal in caliber.     Intrahepatic IVC is patent.     The portal vein is patent with hepatopetal flow. The MPV measures 0.8 cm.     The right kidney measures 10.86 cm.  The left kidney measures 11.41 cm.  There is no hydronephrosis.     The spleen measures 12.74 cm maximally.     The bladder demonstrates no focal wall abnormality.     There is no ascites.     IMPRESSION:  1.  Mild increased hepatic echotexture is suggestive of fatty infiltration.     2.  No sonographic evidence for biliary obstruction.           CTA  1/4/2021 4:41 PM  HISTORY/REASON FOR EXAM:  Shortness of breath; PE suspected, intermediate prob, positive D-dimer; after hours number [phone].     TECHNIQUE/EXAM DESCRIPTION:  CT angiogram scan for pulmonary embolism with contrast, with reconstructions.     1.25 mm helical sections were obtained from the lung apices through the lung bases following the rapid bolus administration of 65 mL of Omnipaque 350 nonionic contrast. Thin-section overlapping reconstruction interval was utilized. Coronal   reconstructions were generated from the axial data. MIP post processing was performed and utilized for the interpretation.     Low dose optimization technique was utilized for this CT exam including automated exposure control and adjustment of the mA and/or kV according to patient size.     COMPARISON: None     FINDINGS:  Neck Base:  Normal.     Lungs/Pleura: Small left pleural effusion. No right pleural effusion. Mild bibasilar opacities are nonspecific and probably represent atelectasis although cannot exclude some  pulmonary infarct in the left lower lobe..     Cardiovascular Structures: The pulmonary arteries are adequately opacified through the subsegmental levels. There are segmental and subsegmental bilateral lower lobe pulmonary emboli. RV:LV ratio is normal. Central pulmonary arteries are normal in   caliber. Heart size is normal. No pericardial effusion. Aorta is normal in caliber without aneurysm or dissection.     Mediastinum/ lymph nodes: No lymphadenopathy.     Upper Abdomen:  Spleen is borderline enlarged.     Soft tissues/wall: Within normal limits.     Bones:  No acute or aggressive abnormality.     IMPRESSION:  1.  Bilateral lower lobe segmental and subsegmental pulmonary emboli. No CT evidence for right heart strain.  2.  Small left pleural effusion. Bibasilar atelectasis with some left lower lobe pulmonary infarct not excluded.  3.  Borderline splenomegaly.         US - BLE   Vascular Laboratory   CONCLUSIONS   Normal bilateral superficial and deep venous examination of the lower extremities.       LAMBERTO KNOX      Exam Date:     2021 09:57   Room #:     Inpatient   Priority:     Routine   Ht (in):             Wt (lb):      Ordering Physician:        MARCIA WHITNEY   Referring Physician:       [id number] Owusu   Sonographer:               Constanza Campoverde RVT   Study Type:                Complete Bilateral   Technical Quality:         Adequate      Age:   50    Gender:     M   MRN:    [id number]   :    1970      BSA:      Indications:     Pulmonary Embolus   CPT Codes:       37333   ICD Codes:       415.19   History: Pulmonary embolus seen on CT 20. No prior duplex.      Limitations:      PROCEDURES:   Bilateral lower extremity venous duplex imaging.    The following venous structures were evaluated: common femoral, profunda femoral, proximal greater saphenous, femoral, popliteal, peroneal and posterior tibial veins.    Serial compression, augmentation maneuvers, color and spectral Doppler flow  evaluations were performed.       FINDINGS:   Bilateral lower extremities -   No evidence of deep venous thrombosis.    Complete color filling and compressibility with normal venous flow dynamics including spontaneous flow, response to augmentation maneuvers, and respiratory phasicity.         A&P from 7/19/21 consult at [facility]            Assessment & Plan        1. Multiple subsegmental pulmonary emboli without acute cor pulmonale (HCC)     2. Current use of long term anticoagulation     3. Encounter for hematology follow-up         1.  PE/long term anticoagulation: Patient diagnosed 1/2021 following COVID injection, negative BLE DVT. He initiated Eliquis 5 mg BID and continues tolerating well. Limited thrombophilia workup has been completed and negative to date - proteins C and S; antithrombin III have not been completed given ongoing anticoagulation.     He has been advised to continue anticoagulation until at least next summer (2022) and be re-evaluated for: discontinuation vs continued therapy vs dose reduction. In the interim, Eliquis 5 mg BID will continue to be refilled per PCP. He would prefer local follow up with our office (rather than travelling to [facility]) and will return in 6/2022 for re-evaluation, sooner as needed.       Patient has been educated and verbalized understanding regarding monitoring for signs & symptoms of DVT and recurrent PE, for which he is to proceed to ER. He may increase activity as tolerated and is reminded to be patient with the healing process. 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           The patient verbalized agreement and understanding of current plan. All questions and concerns were addressed at time of visit.    Please note that this dictation was created using voice recognition software. I have made every reasonable attempt to correct  obvious errors, but I expect that there are errors of grammar and possibly content that I did not discover before finalizing the note.

## 2021-08-26 ENCOUNTER — TELEPHONE (OUTPATIENT)
Dept: HEMATOLOGY ONCOLOGY | Facility: MEDICAL CENTER | Age: 51
End: 2021-08-26

## 2021-08-26 ENCOUNTER — OFFICE VISIT (OUTPATIENT)
Dept: HEMATOLOGY ONCOLOGY | Facility: MEDICAL CENTER | Age: 51
End: 2021-08-26
Payer: COMMERCIAL

## 2021-08-26 VITALS
SYSTOLIC BLOOD PRESSURE: 124 MMHG | RESPIRATION RATE: 16 BRPM | BODY MASS INDEX: 38.6 KG/M2 | DIASTOLIC BLOOD PRESSURE: 68 MMHG | HEART RATE: 82 BPM | HEIGHT: 64 IN | TEMPERATURE: 97.2 F | WEIGHT: 226.08 LBS | OXYGEN SATURATION: 98 %

## 2021-08-26 DIAGNOSIS — Z79.01 CURRENT USE OF LONG TERM ANTICOAGULATION: ICD-10-CM

## 2021-08-26 DIAGNOSIS — Z09 ENCOUNTER FOR HEMATOLOGY FOLLOW-UP: ICD-10-CM

## 2021-08-26 DIAGNOSIS — I26.94 MULTIPLE SUBSEGMENTAL PULMONARY EMBOLI WITHOUT ACUTE COR PULMONALE (HCC): ICD-10-CM

## 2021-08-26 PROCEDURE — 99214 OFFICE O/P EST MOD 30 MIN: CPT | Performed by: NURSE PRACTITIONER

## 2021-08-26 ASSESSMENT — ENCOUNTER SYMPTOMS
VOMITING: 0
HEADACHES: 0
BLOOD IN STOOL: 0
DIAPHORESIS: 1
MYALGIAS: 1
WHEEZING: 0
COUGH: 1
TINGLING: 0
FEVER: 0
INSOMNIA: 0
SHORTNESS OF BREATH: 1
CONSTIPATION: 0
NAUSEA: 0
DIARRHEA: 0
PALPITATIONS: 0
WEIGHT LOSS: 0
DIZZINESS: 0
CHILLS: 0

## 2021-08-26 ASSESSMENT — FIBROSIS 4 INDEX: FIB4 SCORE: 0.86

## 2021-08-26 NOTE — LETTER
2021      Date: 2021    Patient:Prakash Kaur    : 1970    Diagnosis: Pulmonary Embolism     Prakash Kaur is a patient with our office. He continues with ongoing anticoagulation with increased risk for intracranial bleeding in setting of potential trauma. He has a follow up appointment in our office on 2022 to reevaluate.    It is recommended he continue with modified duty that lessen the risk of trama.                     __________________         Lanie Schultz, APRN:                  _____   ______         Date:    Time:

## 2021-09-14 ENCOUNTER — OFFICE VISIT (OUTPATIENT)
Dept: MEDICAL GROUP | Facility: PHYSICIAN GROUP | Age: 51
End: 2021-09-14
Payer: COMMERCIAL

## 2021-09-14 VITALS
TEMPERATURE: 98 F | DIASTOLIC BLOOD PRESSURE: 76 MMHG | BODY MASS INDEX: 32.93 KG/M2 | WEIGHT: 230 LBS | HEIGHT: 70 IN | OXYGEN SATURATION: 98 % | SYSTOLIC BLOOD PRESSURE: 124 MMHG | HEART RATE: 78 BPM

## 2021-09-14 DIAGNOSIS — M25.561 CHRONIC PAIN OF RIGHT KNEE: ICD-10-CM

## 2021-09-14 DIAGNOSIS — I10 ESSENTIAL HYPERTENSION: ICD-10-CM

## 2021-09-14 DIAGNOSIS — G89.29 CHRONIC PAIN OF RIGHT KNEE: ICD-10-CM

## 2021-09-14 PROCEDURE — 99214 OFFICE O/P EST MOD 30 MIN: CPT | Performed by: STUDENT IN AN ORGANIZED HEALTH CARE EDUCATION/TRAINING PROGRAM

## 2021-09-14 RX ORDER — LOSARTAN POTASSIUM AND HYDROCHLOROTHIAZIDE 12.5; 5 MG/1; MG/1
1 TABLET ORAL DAILY
Qty: 90 TABLET | Refills: 2 | Status: SHIPPED | OUTPATIENT
Start: 2021-09-14 | End: 2022-10-21 | Stop reason: SDUPTHER

## 2021-09-14 ASSESSMENT — FIBROSIS 4 INDEX: FIB4 SCORE: 0.86

## 2021-09-14 NOTE — ASSESSMENT & PLAN NOTE
"Acute on chronic right knee pain.   Initially heard \"pop\" 2 years ago,      (when jumping into the air for picture).     (No direct trauma at that time).  Recently, more aggravated for the past 1 month.  Notes pain on outside of knee.  Also pain if kneeling on the knee.      (Only in certain positions, and unpredictable).  Also a pressure sensation in back of knee.  Worse after resting.     (Improves after using for a while).  Only occasional Tylenol for this.  … Exam, with lateral knee tenderness, on varus testing.  -Will refer to orthopedics, for further evaluation.  "

## 2021-09-14 NOTE — PROGRESS NOTES
Established Patient     Prakash Kaur is a 50 y.o. male.    Chief Complaint   Patient presents with   • Lab Results   • Hypertension     fv on bp med change    • Knee Injury     right knee pain              Problems addressed this visit:     This note uses problem-based documentation.  Subjective HPI is included in Assessment & Plan, at bottom of note.   Problem   Chronic Pain of Right Knee   Essential Hypertension                              Past Medical History:   Diagnosis Date   • Allergic rhinitis    • Anesthesia     doesn't do well with novacaine/lidocaine, ponv   • Arthritis    • Asthma    • Asthma    • Eosinophilic esophagitis     Per GI Biopsy   • GERD (gastroesophageal reflux disease)    • History of pulmonary embolus (PE) 2021   • Indigestion        Patient Active Problem List   Diagnosis   • Allergic rhinitis   • Allergic asthma   • Eosinophilic esophagitis   • GERD (gastroesophageal reflux disease)   • Hx pulmonary embolism   • Essential hypertension   • Elevated LFTs   • HLD (hyperlipidemia)   • Skin lesion of face   • Pulmonary embolism (HCC)   • Sleep trouble   • Chronic pain of right knee       Past Surgical History:   Procedure Laterality Date   • INGUINAL HERNIA LAPAROSCOPIC  2012    Performed by ROLAND POLLOCK at SURGERY [facility]   • INGUINAL HERNIA REPAIR  2011    Performed by ROLAND POLLOCK at SUR[facility]   • EGD WITH ASP/BX  10/11    Dr Brice.  BX negative for Piper's       Family History   Problem Relation Age of Onset   • Heart Disease Mother    • Hypertension Father    • Stroke Paternal Uncle    • Heart Disease Maternal Grandfather          of heart attack at 55       Social History     Tobacco Use   • Smoking status: Never Smoker   • Smokeless tobacco: Never Used   Substance Use Topics   • Alcohol use: Yes     Comment: often 2/d, but sometimes 4/d on weekends. -         Current medications:  (including new changes):  Current Outpatient  "Medications   Medication Sig Dispense Refill   • losartan-hydrochlorothiazide (HYZAAR) 50-12.5 MG per tablet Take 1 Tablet by mouth every day. 90 Tablet 2   • apixaban (ELIQUIS) 5mg Tab Take 1 tablet by mouth 2 times a day. 60 tablet 0   • Mometasone Furo-Formoterol Fum (DULERA) 100-5 MCG/ACT Aerosol Inhale 2 Puffs every day.     • omeprazole (PRILOSEC) 20 MG delayed-release capsule Take 20 mg by mouth every day.     • albuterol (VENTOLIN OR PROVENTIL) 108 (90 BASE) MCG/ACT Aero Soln inhalation aerosol Inhale 2 Puffs by mouth every four hours as needed for Shortness of Breath. 8.5 g 3   • cetirizine (ZYRTEC) 10 MG TABS Take 10 mg by mouth every day.       No current facility-administered medications for this visit.       Allergies as of 09/14/2021 - Reviewed 09/14/2021   Allergen Reaction Noted   • Amlodipine  09/14/2021                   Review of Symptoms   (as noted elsewhere, and .....)   GEN/CONST:   Denies fever, chills,    CARDIO:   Denies chest pain, or edema.    RESP:   Denies shortness of breath, or coughing.  GI:    Denies nausea, vomiting, diarrhea,      MSK:  + right knee pain (see HPI).....  PSYCH:  Denies anxiety, depression,           Physical Exam  /76 (BP Location: Right arm, Patient Position: Sitting, BP Cuff Size: Large adult)   Pulse 78   Temp 36.7 °C (98 °F) (Temporal)   Ht 1.778 m (5' 10\")   Wt 104 kg (230 lb)   SpO2 98%   BMI 33.00 kg/m²   General:  Alert and oriented, No apparent distress.    Lungs: Clear to auscultation bilaterally.  No wheezes or rales.  Cardiovascular: Regular rate and rhythm.  No murmurs, rubs or gallops.  Abdomen:  Soft.  Non-tender.  No rebound or guarding.   Extremities:  No leg edema.  Good general motion of extremities.      Right Knee: - No obvious swelling or edema.    - No joint-line tenderness.    - Negative ant/post drawer test.     - No ligamentous laxity on varus/valgus testing.    + Varus testing with pain to lateral knee.    - Negative " "Tashia's test.   Psychological: Appears to have normal mood and affect.        Labs:  Recent / Prior labs reviewed.    CBC, CMP and TSH                             Assessment & Plan  (with subjective history and orders):  Of note, the list below is not in a specific nor sortable order.      Problem List Items Addressed This Visit     Chronic pain of right knee     Acute on chronic right knee pain.   Initially heard \"pop\" 2 years ago,      (when jumping into the air for picture).     (No direct trauma at that time).  Recently, more aggravated for the past 1 month.  Notes pain on outside of knee.  Also pain if kneeling on the knee.      (Only in certain positions, and unpredictable).  Also a pressure sensation in back of knee.  Worse after resting.     (Improves after using for a while).  Only occasional Tylenol for this.  … Exam, with lateral knee tenderness, on varus testing.  -Will refer to orthopedics, for further evaluation.         Relevant Orders    REFERRAL TO ORTHOPEDICS    Essential hypertension     Previously hypertensive, in hospital,       when being evaluated for his PEs.  The hospital started him on amlodipine.     However, he then had leg swelling, recently.     And, still borderline elevated BP on that.  Stopped amlodipine. (changed med)....  Started Hyzaar (losartan-HCTZ) 50-12.5.  Reports home BP in good range.  In office BP-124/76.  Electrolytes in normal ranges.  -Continue Hyzaar 50-12.5.  - follow-up as needed, or in 1 year....          Relevant Medications    losartan-hydrochlorothiazide (HYZAAR) 50-12.5 MG per tablet        Of note, the above list is not in a specific nor sortable order.                  Diagnosis Table, with orders:  1. Essential hypertension  losartan-hydrochlorothiazide (HYZAAR) 50-12.5 MG per tablet   2. Chronic pain of right knee  REFERRAL TO ORTHOPEDICS                 Follow-up:  as needed or in one year - (Next Summer).                 A computerized dictation system " may have been used in this note.    Despite review, there may be some errors in spelling or grammar.    Margarito Adan M.D.  9/14/2021

## 2021-09-14 NOTE — ASSESSMENT & PLAN NOTE
Previously hypertensive, in hospital,       when being evaluated for his PEs.  The hospital started him on amlodipine.     However, he then had leg swelling, recently.     And, still borderline elevated BP on that.  Stopped amlodipine. (changed med)....  Started Hyzaar (losartan-HCTZ) 50-12.5.  Reports home BP in good range.  In office BP-124/76.  Electrolytes in normal ranges.  -Continue Hyzaar 50-12.5.  - follow-up as needed, or in 1 year....

## 2021-09-14 NOTE — PATIENT INSTRUCTIONS
Please follow-up with the referral(s) to Orthopedics.  You will likely receive a phone call or [employer] message, with information about what office to contact, in order to schedule.  However, if you do not hear from them in the next week or two, please call [phone], and ask for the referral line, to find out the status of the referral.

## 2021-11-02 ENCOUNTER — TELEPHONE (OUTPATIENT)
Dept: MEDICAL GROUP | Facility: OTHER | Age: 51
End: 2021-11-02

## 2021-11-02 NOTE — TELEPHONE ENCOUNTER
Phone Number Called: [phone]    Call outcome: Spoke to patient regarding message below.    Message: I have return the patient phone call back he was under the impression that he wouldn't get a bill , for the last couple of visit after his claim was denied. These were visit with [employer]. He doesn't mind paying for the visit he was just wondering if he missed understood your message.

## 2021-11-02 NOTE — TELEPHONE ENCOUNTER
Pt called he came and saw Dr Bernal. It was first a w/c but it was denied. Dr Bernal told the pt he would still see him but not under w/c. Pt is calling because he got two bills and he thought he didn't have to pay for those visits. Please call pt @ [phone]

## 2021-11-12 NOTE — TELEPHONE ENCOUNTER
Phone Number Called: [phone]    Call outcome: Left detailed message for patient. Informed to call back with any additional questions.    Message: I have called the patient letting him know, per Dr. Bernal he informed him that workers comp won't pay for it. It would go under the his private ins.no answer left a message.

## 2022-05-16 RX ORDER — APIXABAN 5 MG/1
TABLET, FILM COATED ORAL
Qty: 180 TABLET | Refills: 1 | Status: SHIPPED | OUTPATIENT
Start: 2022-05-16 | End: 2022-08-05

## 2022-06-06 ENCOUNTER — OFFICE VISIT (OUTPATIENT)
Dept: HEMATOLOGY ONCOLOGY | Facility: MEDICAL CENTER | Age: 52
End: 2022-06-06
Payer: COMMERCIAL

## 2022-06-06 VITALS
SYSTOLIC BLOOD PRESSURE: 126 MMHG | HEART RATE: 71 BPM | OXYGEN SATURATION: 95 % | DIASTOLIC BLOOD PRESSURE: 82 MMHG | HEIGHT: 70 IN | TEMPERATURE: 97.6 F | RESPIRATION RATE: 16 BRPM | WEIGHT: 229.4 LBS | BODY MASS INDEX: 32.84 KG/M2

## 2022-06-06 DIAGNOSIS — I26.94 MULTIPLE SUBSEGMENTAL PULMONARY EMBOLI WITHOUT ACUTE COR PULMONALE (HCC): ICD-10-CM

## 2022-06-06 PROCEDURE — 99213 OFFICE O/P EST LOW 20 MIN: CPT | Performed by: STUDENT IN AN ORGANIZED HEALTH CARE EDUCATION/TRAINING PROGRAM

## 2022-06-06 ASSESSMENT — ENCOUNTER SYMPTOMS
ABDOMINAL PAIN: 0
MYALGIAS: 0
BACK PAIN: 0
SPUTUM PRODUCTION: 0
DIARRHEA: 0
TINGLING: 0
DOUBLE VISION: 0
DIAPHORESIS: 0
WEAKNESS: 0
BLURRED VISION: 0
FEVER: 0
BLOOD IN STOOL: 0
SORE THROAT: 0
VOMITING: 0
NERVOUS/ANXIOUS: 0
COUGH: 0
INSOMNIA: 0
WEIGHT LOSS: 0
CONSTIPATION: 0
HEADACHES: 0
CHILLS: 0
PALPITATIONS: 0
ORTHOPNEA: 0
HEARTBURN: 0
BRUISES/BLEEDS EASILY: 0
WHEEZING: 0
NAUSEA: 0
DIZZINESS: 0
SINUS PAIN: 0
SHORTNESS OF BREATH: 0

## 2022-06-06 ASSESSMENT — PATIENT HEALTH QUESTIONNAIRE - PHQ9: CLINICAL INTERPRETATION OF PHQ2 SCORE: 0

## 2022-06-06 ASSESSMENT — FIBROSIS 4 INDEX: FIB4 SCORE: 0.88

## 2022-06-06 ASSESSMENT — PAIN SCALES - GENERAL: PAINLEVEL: NO PAIN

## 2022-06-06 NOTE — PROGRESS NOTES
Follow Up Note:  Hematology/Oncology      Primary Care:  Margarito Adan M.D.    Diagnosis: Pulmonary embolism after COVID 19 vaccination    Chief Complaint: Follow up on anticoagulation    Current Treatment: Apixaban 5 mg PO BID    Prior Treatment: NA    Oncology History of Presenting Illness:  Prakash Kaur is a 51 y.o.  man who presents to the clinic for follow up, having developed pulmonary emboli a few days after receiving the COVID 19 vaccine. He was started on anticoagulation and had a partial hypercoagulable work up, which was negative. He has no family history of clotting disorders. He has tolerated apixaban well and has had no complaints. He has received the second COVID 19 vaccine and had no issues with it. He has been on modified duty as a , and has done well thus far.     Treatment History:   12/30/2020: Moderna vaccine  01/04/21: PE diagnosed, started on apixaban 5 mg PO BID    Interval History:  Patient is here for follow up visit.  He feels well and has no complaints.     Allergies as of 06/06/2022 - Reviewed 06/06/2022   Allergen Reaction Noted   • Amlodipine  09/14/2021         Current Outpatient Medications:   •  ELIQUIS 5 MG Tab, TAKE ONE TABLET BY MOUTH TWICE A DAY, Disp: 180 Tablet, Rfl: 1  •  losartan-hydrochlorothiazide (HYZAAR) 50-12.5 MG per tablet, Take 1 Tablet by mouth every day., Disp: 90 Tablet, Rfl: 2  •  Mometasone Furo-Formoterol Fum (DULERA) 100-5 MCG/ACT Aerosol, Inhale 2 Puffs every day., Disp: , Rfl:   •  omeprazole (PRILOSEC) 20 MG delayed-release capsule, Take 20 mg by mouth every day., Disp: , Rfl:   •  albuterol (VENTOLIN OR PROVENTIL) 108 (90 BASE) MCG/ACT Aero Soln inhalation aerosol, Inhale 2 Puffs by mouth every four hours as needed for Shortness of Breath., Disp: 8.5 g, Rfl: 3  •  cetirizine (ZYRTEC) 10 MG TABS, Take 10 mg by mouth every day., Disp: , Rfl:       Review of Systems:  Review of Systems   Constitutional: Negative for  "chills, diaphoresis, fever, malaise/fatigue and weight loss.   HENT: Negative for hearing loss, nosebleeds, sinus pain and sore throat.    Eyes: Negative for blurred vision and double vision.   Respiratory: Negative for cough, sputum production, shortness of breath and wheezing.    Cardiovascular: Negative for chest pain, palpitations, orthopnea and leg swelling.   Gastrointestinal: Negative for abdominal pain, blood in stool, constipation, diarrhea, heartburn, melena, nausea and vomiting.   Genitourinary: Negative for dysuria, frequency, hematuria and urgency.   Musculoskeletal: Negative for back pain, joint pain and myalgias.   Skin: Negative for rash.   Neurological: Negative for dizziness, tingling, weakness and headaches.   Endo/Heme/Allergies: Does not bruise/bleed easily.   Psychiatric/Behavioral: The patient is not nervous/anxious and does not have insomnia.          Physical Exam:  Vitals:    06/06/22 0918   BP: 126/82   BP Location: Right arm   Patient Position: Sitting   BP Cuff Size: Adult   Pulse: 71   Resp: 16   Temp: 36.4 °C (97.6 °F)   TempSrc: Temporal   SpO2: 95%   Weight: 104 kg (229 lb 6.4 oz)   Height: 1.778 m (5' 10\")       DESC; KARNOFSKY SCALE WITH ECOG EQUIVALENT: 100, Fully active, able to carry on all pre-disease performed without restriction (ECOG equivalent 0)    DISTRESS LEVEL: no apparent distress    Physical Exam  Vitals and nursing note reviewed.   Constitutional:       General: He is awake. He is not in acute distress.     Appearance: Normal appearance. He is normal weight. He is not ill-appearing, toxic-appearing or diaphoretic.   HENT:      Head: Normocephalic and atraumatic.      Nose: Nose normal. No congestion.      Mouth/Throat:      Pharynx: Oropharynx is clear. No oropharyngeal exudate or posterior oropharyngeal erythema.   Eyes:      General: No scleral icterus.     Extraocular Movements: Extraocular movements intact.      Conjunctiva/sclera: Conjunctivae normal.      " Pupils: Pupils are equal, round, and reactive to light.   Cardiovascular:      Rate and Rhythm: Normal rate and regular rhythm.      Pulses: Normal pulses.      Heart sounds: Normal heart sounds. No murmur heard.    No friction rub. No gallop.   Pulmonary:      Effort: Pulmonary effort is normal.      Breath sounds: Normal breath sounds. No decreased air movement. No wheezing, rhonchi or rales.   Chest:   Breasts:      Right: No axillary adenopathy.      Left: No axillary adenopathy.       Abdominal:      General: Bowel sounds are normal. There is no distension.      Tenderness: There is no abdominal tenderness.   Musculoskeletal:         General: No deformity. Normal range of motion.      Cervical back: Normal range of motion and neck supple. No tenderness.      Right lower leg: No edema.      Left lower leg: No edema.   Lymphadenopathy:      Cervical: No cervical adenopathy.      Upper Body:      Right upper body: No axillary adenopathy.      Left upper body: No axillary adenopathy.      Lower Body: No right inguinal adenopathy. No left inguinal adenopathy.   Skin:     General: Skin is warm and dry.      Coloration: Skin is not jaundiced.      Findings: No erythema or rash.   Neurological:      General: No focal deficit present.      Mental Status: He is alert and oriented to person, place, and time.      Cranial Nerves: Cranial nerves are intact.      Sensory: Sensation is intact.      Motor: Motor function is intact. No weakness.      Gait: Gait is intact.   Psychiatric:         Attention and Perception: Attention normal.         Mood and Affect: Mood normal.         Behavior: Behavior normal. Behavior is cooperative.         Thought Content: Thought content normal.         Judgment: Judgment normal.           Labs:  No visits with results within 7 Day(s) from this visit.   Latest known visit with results is:   Hospital Outpatient Visit on 08/24/2021   Component Date Value Ref Range Status   • HIT PF4 Antibody  IgG 08/24/2021 0.252  <=0.399 OD Final   • HIT Interpretation 08/24/2021 Negative  <=0.399 Final    Comment: Negative results have a good negative predictive value  for HIT, although rare false-negative results may occur.    Original FDA approval for this test is for manual testing.  This test was performed on automation which is considered  off label use.     • TSH 08/24/2021 1.050  0.380 - 5.330 uIU/mL Final    Comment: Reference Range:    Pregnant Females, 1st Trimester  0.050-3.700  Pregnant Females, 2nd Trimester  0.310-4.350  Pregnant Females, 3rd Trimester  0.410-5.180     • Free T-4 08/24/2021 1.30  0.93 - 1.70 ng/dL Final       Imaging:     All listed images below have been independently reviewed by me. I agree with the findings as summarized below:    No results found.    Pathology:  NA    Assessment & Plan:  1. Multiple subsegmental pulmonary emboli without acute cor pulmonale (HCC)         This is a 51 year old  man with a history of PE, seemingly provoked by the Moderna COVID vaccine. He has been on apixaban and has tolerated this well.     Current Diagnosis and Staging: Provoked pulmonary embolism    Update: Patient will be reduced to prophylactic dose apixaban, 2.5 mg PO BID. If he tolerates this well over the next 6 months, we will likely take him off of anticoagulation altogether, given that there are reports of VTE associated with the COVID vaccine (though this is very rare). The patient has no family history suggestive of hypercoagulable state, and therefore further work up for hypercoagulability is not indicated.     Treatment Plan: Apixaban 2.5 mg PO BID, with consideration for discontinuation after 6 months.     Treatment Citation: NA    Plan of Care:    1. Primary Therapy: Apixaban 2.5 mg PO BID (prophylactic dosing).   2. Supportive Therapy: NA  3. Labs: CBC with diff, CMP annually  4. Imaging: NA  5. Treatment Planning: Will decrease apixaban to prophylactic dosing, 2.5 mg PO BID.  If patient has no issues over the next 6 months, will discontinue altogether since this VTE event was seemingly provoked.   6. Consultations: NA  7. Code Status: Full  8. Miscellaneous: Patient should remain on modified duty until he comes off of apixaban altogether.   9. Return for Follow Up: 6 months    Any questions and concerns raised by the patient were answered to the best of my ability. Thank you for allowing me to participate in the care for this patient. Please feel free to contact me for any questions or concerns.       Total time spent on chart review, clinic encounter, and documentation: 22 minutes.

## 2022-07-05 ENCOUNTER — TELEPHONE (OUTPATIENT)
Dept: HEMATOLOGY ONCOLOGY | Facility: MEDICAL CENTER | Age: 52
End: 2022-07-05
Payer: COMMERCIAL

## 2022-07-05 ENCOUNTER — TELEPHONE (OUTPATIENT)
Dept: SCHEDULING | Facility: IMAGING CENTER | Age: 52
End: 2022-07-05

## 2022-07-05 DIAGNOSIS — I26.94 MULTIPLE SUBSEGMENTAL PULMONARY EMBOLI WITHOUT ACUTE COR PULMONALE (HCC): ICD-10-CM

## 2022-07-05 NOTE — TELEPHONE ENCOUNTER
Pt called to see if we would be able to prescribe him with his Eliquis Rx. Pt stated that he went on vacation and brought enough with him for the trip, however, he got Covid and hasn't been able to fly back. Pt is hoping to fly back Friday but doesn't have enough of his medication to get him through. Pt also stated that he called his PCP and asked him to refill it but there has been a discrepancy with his workman's comp and Dr. Bernal would not refill it. If we can refill the Rx patient would like it sent to [facility] [id number]

## 2022-08-05 ENCOUNTER — OFFICE VISIT (OUTPATIENT)
Dept: MEDICAL GROUP | Facility: PHYSICIAN GROUP | Age: 52
End: 2022-08-05
Payer: COMMERCIAL

## 2022-08-05 VITALS
OXYGEN SATURATION: 98 % | TEMPERATURE: 98.1 F | HEIGHT: 70 IN | WEIGHT: 222 LBS | HEART RATE: 87 BPM | DIASTOLIC BLOOD PRESSURE: 78 MMHG | BODY MASS INDEX: 31.78 KG/M2 | SYSTOLIC BLOOD PRESSURE: 108 MMHG

## 2022-08-05 DIAGNOSIS — J45.30 MILD PERSISTENT EXTRINSIC ASTHMA WITHOUT COMPLICATION: ICD-10-CM

## 2022-08-05 DIAGNOSIS — R05.9 COUGH: ICD-10-CM

## 2022-08-05 DIAGNOSIS — J45.40 ALLERGIC ASTHMA, MODERATE PERSISTENT, UNCOMPLICATED: ICD-10-CM

## 2022-08-05 DIAGNOSIS — Z87.898 HISTORY OF PALPITATIONS: ICD-10-CM

## 2022-08-05 DIAGNOSIS — I10 ESSENTIAL HYPERTENSION: ICD-10-CM

## 2022-08-05 PROCEDURE — 94640 AIRWAY INHALATION TREATMENT: CPT | Performed by: NURSE PRACTITIONER

## 2022-08-05 PROCEDURE — 99214 OFFICE O/P EST MOD 30 MIN: CPT | Mod: 25 | Performed by: NURSE PRACTITIONER

## 2022-08-05 RX ORDER — ALBUTEROL SULFATE 90 UG/1
2 AEROSOL, METERED RESPIRATORY (INHALATION) EVERY 4 HOURS PRN
Qty: 8.5 G | Refills: 3 | Status: SHIPPED
Start: 2022-08-05 | End: 2023-01-18

## 2022-08-05 RX ORDER — METHYLPREDNISOLONE 4 MG/1
TABLET ORAL
Qty: 21 TABLET | Refills: 0 | Status: SHIPPED | OUTPATIENT
Start: 2022-08-05 | End: 2022-11-13

## 2022-08-05 RX ORDER — IPRATROPIUM BROMIDE AND ALBUTEROL SULFATE 2.5; .5 MG/3ML; MG/3ML
3 SOLUTION RESPIRATORY (INHALATION) ONCE
Status: COMPLETED | OUTPATIENT
Start: 2022-08-05 | End: 2022-08-05

## 2022-08-05 RX ADMIN — IPRATROPIUM BROMIDE AND ALBUTEROL SULFATE 3 ML: 2.5; .5 SOLUTION RESPIRATORY (INHALATION) at 09:21

## 2022-08-05 ASSESSMENT — ENCOUNTER SYMPTOMS
SPUTUM PRODUCTION: 0
COUGH: 1
NEUROLOGICAL NEGATIVE: 1
GASTROINTESTINAL NEGATIVE: 1
EYES NEGATIVE: 1
HEMOPTYSIS: 0
FEVER: 0
CONSTITUTIONAL NEGATIVE: 1
WHEEZING: 0
SHORTNESS OF BREATH: 1
PALPITATIONS: 1
MUSCULOSKELETAL NEGATIVE: 1
PSYCHIATRIC NEGATIVE: 1

## 2022-08-05 ASSESSMENT — FIBROSIS 4 INDEX: FIB4 SCORE: 0.88

## 2022-08-05 NOTE — ASSESSMENT & PLAN NOTE
Last month had while in [place] developed vomiting with fever, cough, SOB; was seen in ER; told he had pleurisy; CT was negative for clots. Did not require hospitalization or oxygen supplementation.   States cough got better and then worse, especially in the mornings when he first wakes up. Cough is non-productive, no fever today.  He is SOB when he walks up stairs.  - lungs are clear today; dry cough noted; room oxygen saturation 98%  - duo neb treatment today in clinic  - continue Dulera as ordered by allergist. He has appt today for monthly allergy shot & then next week with allergist. He will update them on recent COVID infection, lingering cough  - refilled prn albuterol inhaler today; start medrol dose pack  - new referral to see pulmonologist re:hx asthma, persisting cough post COVID; had PFT recently with allergist, will request records

## 2022-08-05 NOTE — ASSESSMENT & PLAN NOTE
BP in clinic today 108/78; No CP, dizziness; has recurring palpitations and exertional SOB re:COVID infection; normal EKG today  - continue Losartan-HCTZ 50-12.5mg QD  - monitor CMP, lipid annually

## 2022-08-05 NOTE — PROGRESS NOTES
Subjective:     CC:    Chief Complaint   Patient presents with   • Establish Care   • Shortness of Breath     Had Covid x 1 month ago/ still SOB and cough / HX of blood clots in the lungs         HISTORY OF THE PRESENT ILLNESS: Patient is a 51 y.o. male, here today to establish care. Prior PCP was Dr Flores. He had COVID infection about a month ago, persisting SOB, recurrent palpitations.  The below problems were discussed/reviewed at this visit:    Problem   History of Palpitations    States had palpitations 2 years after getting COVID vaccine. Saw cardiology, had continous heart monitor for 2 months, no significant findings or etiology. States palpitations stopped and recently feeling it again with COVID infection last month. No chest pain or pressure; he is short of breath with exertion from COVID infection & asthma hx; no edema, no dizziness  - normal EKG today   - will monitor for now  - check CMP, lipid, CBC with annual labs     Essential Hypertension    Taking Losartan-HCTZ 50-12.5mg QD  Had edema with Amlodipine     Hx Pulmonary Embolism    COVID vaccine 12/30/2020; worsening SOB, diagnosed bilateral PE 1/4/2021. Followed by hematology; Eliquis recently reduced to 2.5mg QD in 6/2022.  He has new episode of SOB, palpitations & tested COVID +ve 7/6/2022; CT chest negative for clots. He is getting new referral to pulmonolgist     Allergic Asthma    On daily Dulera 100-5 2puff daily  Followed/managed by allergist         Patient Active Problem List   Diagnosis   • Allergic rhinitis   • Allergic asthma   • Eosinophilic esophagitis   • GERD (gastroesophageal reflux disease)   • Hx pulmonary embolism   • Essential hypertension   • Elevated LFTs   • HLD (hyperlipidemia)   • Skin lesion of face   • Pulmonary embolism (HCC)   • Sleep trouble   • Chronic pain of right knee   • History of palpitations       Past Medical History:   Diagnosis Date   • Allergic rhinitis    • Anesthesia     doesn't do well with  novacaine/lidocaine, ponv   • Arthritis    • Asthma    • Asthma    • Eosinophilic esophagitis 2011    Per GI Biopsy   • GERD (gastroesophageal reflux disease)    • History of pulmonary embolus (PE) 01/2021   • Indigestion         Current Outpatient Medications Ordered in Epic   Medication Sig Dispense Refill   • methylPREDNISolone (MEDROL DOSEPAK) 4 MG Tablet Therapy Pack Per  directions on package 21 Tablet 0   • albuterol 108 (90 Base) MCG/ACT Aero Soln inhalation aerosol Inhale 2 Puffs every four hours as needed for Shortness of Breath. 8.5 g 3   • ELIQUIS 2.5 MG Tab TAKE 1 TABLET BY MOUTH TWICE DAILY 60 Tablet 2   • losartan-hydrochlorothiazide (HYZAAR) 50-12.5 MG per tablet Take 1 Tablet by mouth every day. 90 Tablet 2   • Mometasone Furo-Formoterol Fum (DULERA) 100-5 MCG/ACT Aerosol Inhale 2 Puffs every day.     • omeprazole (PRILOSEC) 20 MG delayed-release capsule Take 20 mg by mouth every day.     • cetirizine (ZYRTEC) 10 MG TABS Take 10 mg by mouth every day.       No current [facility]-ordered facility-administered medications on file.        Past Surgical History:   Procedure Laterality Date   • INGUINAL HERNIA LAPAROSCOPIC  8/29/2012    Performed by ROLAND POLLOCK at SURGERY [facility] ORS   • INGUINAL HERNIA REPAIR  11/30/2011    Performed by ROLAND POLLOCK at SURGERY [facility] ORS   • EGD WITH ASP/BX  10/11    Dr Brice.  BX negative for Piper's        Allergies:  Amlodipine    Health Maintenance: Completed    ROS:   Review of Systems   Constitutional: Negative.  Negative for fever and malaise/fatigue.   HENT: Negative.    Eyes: Negative.    Respiratory: Positive for cough and shortness of breath. Negative for hemoptysis, sputum production and wheezing.    Cardiovascular: Positive for palpitations. Negative for chest pain and leg swelling.   Gastrointestinal: Negative.    Genitourinary: Negative.    Musculoskeletal: Negative.    Neurological: Negative.    Endo/Heme/Allergies: Negative.   "  Psychiatric/Behavioral: Negative.        Objective:     Exam: /78 (BP Location: Left arm, Patient Position: Sitting, BP Cuff Size: Large adult)   Pulse 87   Temp 36.7 °C (98.1 °F) (Temporal)   Ht 1.778 m (5' 10\")   Wt 101 kg (222 lb)   SpO2 98%  Body mass index is 31.85 kg/m².    Physical Exam  Constitutional:       Appearance: Normal appearance.   Cardiovascular:      Rate and Rhythm: Normal rate and regular rhythm.      Pulses: Normal pulses.      Heart sounds: Normal heart sounds.   Pulmonary:      Effort: Pulmonary effort is normal.      Breath sounds: Normal breath sounds.   Musculoskeletal:         General: Normal range of motion.      Right lower leg: No edema.      Left lower leg: No edema.   Skin:     General: Skin is warm and dry.   Neurological:      General: No focal deficit present.      Mental Status: He is alert and oriented to person, place, and time.        EKG Interpretation  Interpreted by myself     Measurements  CA  148  QRSD  87  QT  366  QTc  417    AXIS  P  37  QRS  46  T  36    Rhythm: normal sinus   Rate: 78  Axis: normal  Ectopy: none  Conduction: normal  ST Segments: no acute change and normal  T Waves: no acute change and normal  Q Waves: none    Clinical Impression: no acute changes; NORMAL EKG    Assessment & Plan:   51 y.o. male with the following -    Problem List Items Addressed This Visit     Allergic asthma     Last month had while in [place] developed vomiting with fever, cough, SOB; was seen in ER; told he had pleurisy; CT was negative for clots. Did not require hospitalization or oxygen supplementation.   States cough got better and then worse, especially in the mornings when he first wakes up. Cough is non-productive, no fever today.  He is SOB when he walks up stairs.  - lungs are clear today; dry cough noted; room oxygen saturation 98%  - duo neb treatment today in clinic  - continue Dulera as ordered by allergist. He has appt today for monthly allergy shot & " then next week with allergist. He will update them on recent COVID infection, lingering cough  - refilled prn albuterol inhaler today; start medrol dose pack  - new referral to see pulmonologist re:hx asthma, persisting cough post COVID; had PFT recently with allergist, will request records           Relevant Medications    methylPREDNISolone (MEDROL DOSEPAK) 4 MG Tablet Therapy Pack    albuterol 108 (90 Base) MCG/ACT Aero Soln inhalation aerosol    Other Relevant Orders    Referral to Pulmonary and Sleep Medicine    Essential hypertension     BP in clinic today 108/78; No CP, dizziness; has recurring palpitations and exertional SOB re:COVID infection; normal EKG today  - continue Losartan-HCTZ 50-12.5mg QD  - monitor CMP, lipid annually           Relevant Orders    Comp Metabolic Panel    Lipid Profile    CBC WITHOUT DIFFERENTIAL    Referral to Pulmonary and Sleep Medicine    History of palpitations    Relevant Orders    EKG - Clinic Performed      Other Visit Diagnoses     Cough        Relevant Orders    Referral to Pulmonary and Sleep Medicine    Allergic asthma, moderate persistent, uncomplicated        Relevant Medications    ipratropium-albuterol (DUONEB) nebulizer solution (Completed)    albuterol 108 (90 Base) MCG/ACT Aero Soln inhalation aerosol        Medications Prescribed Today:  1. Mild persistent extrinsic asthma without complication  - ipratropium-albuterol (DUONEB) nebulizer solution  - methylPREDNISolone (MEDROL DOSEPAK) 4 MG Tablet Therapy Pack; Per  directions on package  Dispense: 21 Tablet; Refill: 0  - albuterol 108 (90 Base) MCG/ACT Aero Soln inhalation aerosol; Inhale 2 Puffs every four hours as needed for Shortness of Breath.  Dispense: 8.5 g; Refill: 3    Educated in proper administration of medication(s) ordered today including safety, possible SE, risks, benefits, rationale and alternatives to therapy.     Return in about 3 months (around 11/5/2022) for f-up on cough/pulm  referral .    Please note that this dictation was created using voice recognition software. I have made every reasonable attempt to correct obvious errors, but I expect that there are errors of grammar and possibly content that I did not discover before finalizing the note.

## 2022-10-02 NOTE — PROGRESS NOTES
Pulmonary Clinic- Initial Consult    Date of Service: 10/4/22    Referring Physician: Ivet Danielle D.N.P.    Reason for Consult: Cough, asthma    Chief Complaint:   Chief Complaint   Patient presents with    Lung Cancer Screening Program Low Dose Ct Result    Results     Echo 01/04/2021, CT-CTA 01/04/2021       HPI:   Prakash Kaur is a 51 y.o. male who is followed by Ivet Danielle and is referred to the pulmonary clinic for Cough.  Patient has a lifelong history of asthma diagnosed as a child.  He has most recently been managed with Dulera maintenance therapy with albuterol as needed.  He is a .  He was vaccinated for COVID in January 2021 and developed bilateral PEs after the COVID vaccination.  He was placed on Eliquis and followed by hematology and remains on Eliquis 2.5 twice daily currently.  He then contracted COVID in July 2022 and had severe pleurisy which has since resolved with a severe cough which is better but not gone.  He states he still coughs mostly in the morning with increased phlegm.  He has exercise intolerance and has been unable to work out like he used to.  He has a history of severe allergies for which he has been taking allergy shots for years.  His allergist has recently stopped these shots to see how he does off this therapy.      After his PE he saw Dr. Leonard and then he went to [place] [facility] where it was recommended that he stay on eliquis until summer of 2022 and then follow up.  Dr. Leonard had decreased the eliquis to 2.5 BID.  His wife accompanies him today and states she feels like he never fully recovered from his PE.  He is a never smoker but has been exposed to some wildfires at his job as a .          Past Medical History:   Diagnosis Date    Allergic rhinitis     Anesthesia     doesn't do well with novacaine/lidocaine, ponv    Arthritis     Asthma     Asthma     Cough     Eosinophilic esophagitis 2011    Per GI Biopsy    GERD  (gastroesophageal reflux disease)     History of pulmonary embolus (PE) 2021    Indigestion     Shortness of breath     Sputum production     Wheezing        Past Surgical History:   Procedure Laterality Date    INGUINAL HERNIA LAPAROSCOPIC  2012    Performed by ROLAND POLLOCK at SURGERY [facility]S    INGUINAL HERNIA REPAIR  2011    Performed by ROLAND POLLOCK at [facility]GERY [facility]    EGD WITH ASP/BX  10/11    Dr Brice.  BX negative for Piper's       Social History     Socioeconomic History    Marital status:      Spouse name: Not on file    Number of children: Not on file    Years of education: Not on file    Highest education level: Not on file   Occupational History    Occupation:      Employer: [employer]   Tobacco Use    Smoking status: Former     Types: Cigarettes     Quit date: 2007     Years since quitting: 15.7    Smokeless tobacco: Former     Types: Chew     Quit date: 2007   Vaping Use    Vaping Use: Never used   Substance and Sexual Activity    Alcohol use: Yes     Alcohol/week: 1.8 oz     Types: 1 Glasses of wine, 1 Cans of beer, 1 Shots of liquor per week     Comment: often 2/d, but sometimes 4/d on weekends. -    Drug use: No    Sexual activity: Yes     Partners: Female   Other Topics Concern    Not on file   Social History Narrative    ** Merged History Encounter **          Social Determinants of Health     Financial Resource Strain: Not on file   Food Insecurity: Not on file   Transportation Needs: Not on file   Physical Activity: Not on file   Stress: Not on file   Social Connections: Not on file   Intimate Partner Violence: Not on file   Housing Stability: Not on file          Family History   Problem Relation Age of Onset    Heart Disease Mother     Hypertension Father     Stroke Paternal Uncle     Heart Disease Maternal Grandfather          of heart attack at 55       Current Outpatient Medications on File Prior to  "Visit   Medication Sig Dispense Refill    methylPREDNISolone (MEDROL DOSEPAK) 4 MG Tablet Therapy Pack Per  directions on package 21 Tablet 0    albuterol 108 (90 Base) MCG/ACT Aero Soln inhalation aerosol Inhale 2 Puffs every four hours as needed for Shortness of Breath. 8.5 g 3    ELIQUIS 2.5 MG Tab TAKE 1 TABLET BY MOUTH TWICE DAILY 60 Tablet 2    losartan-hydrochlorothiazide (HYZAAR) 50-12.5 MG per tablet Take 1 Tablet by mouth every day. 90 Tablet 2    Mometasone Furo-Formoterol Fum (DULERA) 100-5 MCG/ACT Aerosol Inhale 2 Puffs every day.      omeprazole (PRILOSEC) 20 MG delayed-release capsule Take 20 mg by mouth every day.      cetirizine (ZYRTEC) 10 MG TABS Take 10 mg by mouth every day.       No current facility-administered medications on file prior to visit.       Allergies: Amlodipine      ROS:   Review of Systems   Constitutional:  Positive for malaise/fatigue. Negative for chills and fever.   HENT:  Negative for congestion and sinus pain.    Respiratory:  Positive for cough, sputum production and shortness of breath. Negative for hemoptysis and wheezing.    Cardiovascular:  Negative for chest pain, palpitations and leg swelling.   Gastrointestinal:  Negative for heartburn.   Genitourinary:  Negative for dysuria.   Musculoskeletal:  Negative for myalgias.   Neurological:  Negative for dizziness and headaches.   Endo/Heme/Allergies:  Positive for environmental allergies.     Vitals:  /70 (BP Location: Left arm, Patient Position: Sitting, BP Cuff Size: Adult)   Pulse 77   Ht 1.803 m (5' 11\")   Wt 105 kg (232 lb)   SpO2 93%     Physical Exam:  Physical Exam  Constitutional:       Appearance: Normal appearance. He is obese.   HENT:      Head: Atraumatic.      Mouth/Throat:      Mouth: Mucous membranes are dry.   Eyes:      Extraocular Movements: Extraocular movements intact.      Pupils: Pupils are equal, round, and reactive to light.   Cardiovascular:      Rate and Rhythm: Normal rate " and regular rhythm.      Heart sounds: Normal heart sounds.   Pulmonary:      Effort: Pulmonary effort is normal. No respiratory distress.      Breath sounds: Normal breath sounds. No wheezing or rales.   Abdominal:      General: Abdomen is flat.      Palpations: Abdomen is soft.   Musculoskeletal:         General: No swelling, tenderness or deformity.   Skin:     General: Skin is warm and dry.   Neurological:      General: No focal deficit present.      Mental Status: He is alert and oriented to person, place, and time.       Laboratory Data:      Pertinent Studies:    PFTs as reviewed by me personally show:    Imaging as reviewed by me personally show:    CTA 8/1/22:    CONCLUSIONS: No pulmonary embolism or other significant findings.     Echo:  1/4/21:  CONCLUSIONS  Compared to the images of the prior study done on 12/31/08 there has   been no significant change.   Left ventricular ejection fraction is visually estimated to be 55%.  Right ventricular systolic pressure is estimated to be 25 mmHg.    Assessment/Plan:    Problem List Items Addressed This Visit       Allergic asthma     Asthma flare since having Covid in July  Plan:  - Increase therapy to Advair 500 for now  - Xopenex as needed - Patient with increased palpitations with albuterol  - PFTs  - Recommend continuing allergy shots for now and he is already struggling with an asthma flare since July.    - IgE and CBC with diff  - Encourage exercise  - Encourage vaccination  - Follow up in 3 months         Relevant Medications    fluticasone-salmeterol (ADVAIR DISKUS) 500-50 MCG/ACT AEROSOL POWDER, BREATH ACTIVATED    levalbuterol (XOPENEX HFA) 45 MCG/ACT inhaler    Pulmonary embolism (HCC)     Pulmonary embolism as a result of Moderna vaccine.  This is a common pulmonary diagnosis after covid and has also been documented in the literature as a result of vaccination.  In general this is treated as a provoked clot.  It appears that Dr. Leonard and Dr. Alvarado  from [place] are ok with discontinuation of Eliquis.  Currently on 2.5BID.    Plan:  - VQ scan to assess for chronic clot as patient continues to have shortness of breath and feels he has not fully recovered  - Continue Eliquis 2.5 BID for now           Relevant Orders    DX-CHEST-LIMITED (1 VIEW)     Other Visit Diagnoses       Moderate persistent asthma, unspecified whether complicated        Relevant Medications    fluticasone-salmeterol (ADVAIR DISKUS) 500-50 MCG/ACT AEROSOL POWDER, BREATH ACTIVATED    levalbuterol (XOPENEX HFA) 45 MCG/ACT inhaler    Other Relevant Orders    PULMONARY FUNCTION TESTS -Test requested: Complete Pulmonary Function Test    NM-LUNG PERFUSION IMAGING    IGE TOTAL C IMMUNOCAP    CBC WITH DIFFERENTIAL             Return in about 3 months (around 1/4/2023).     This note was generated using voice recognition software which has a chance of producing errors of grammar and possibly content.  I have made every reasonable attempt to find and correct any obvious errors, but it should be expected that some may not be found prior to finalization of this note.    Time spent in record review prior to patient arrival, reviewing results, and in face-to-face encounter totaled 45 min, excluding any procedures if performed.      Awa Sprague MD RD  Pulmonary and Critical Care Medicine  Pending sale to [facility]

## 2022-10-04 ENCOUNTER — OFFICE VISIT (OUTPATIENT)
Dept: SLEEP MEDICINE | Facility: MEDICAL CENTER | Age: 52
End: 2022-10-04
Payer: COMMERCIAL

## 2022-10-04 VITALS
DIASTOLIC BLOOD PRESSURE: 70 MMHG | WEIGHT: 232 LBS | HEIGHT: 71 IN | SYSTOLIC BLOOD PRESSURE: 130 MMHG | OXYGEN SATURATION: 93 % | HEART RATE: 77 BPM | BODY MASS INDEX: 32.48 KG/M2

## 2022-10-04 DIAGNOSIS — J45.40 MODERATE PERSISTENT ASTHMA, UNSPECIFIED WHETHER COMPLICATED: ICD-10-CM

## 2022-10-04 DIAGNOSIS — I26.94 MULTIPLE SUBSEGMENTAL PULMONARY EMBOLI WITHOUT ACUTE COR PULMONALE (HCC): ICD-10-CM

## 2022-10-04 DIAGNOSIS — J45.30 MILD PERSISTENT EXTRINSIC ASTHMA WITHOUT COMPLICATION: ICD-10-CM

## 2022-10-04 PROCEDURE — 99204 OFFICE O/P NEW MOD 45 MIN: CPT | Performed by: INTERNAL MEDICINE

## 2022-10-04 RX ORDER — LEVALBUTEROL TARTRATE 45 UG/1
2 AEROSOL, METERED ORAL EVERY 4 HOURS PRN
Qty: 1 EACH | Refills: 3 | Status: SHIPPED | OUTPATIENT
Start: 2022-10-04 | End: 2024-03-01 | Stop reason: SDUPTHER

## 2022-10-04 RX ORDER — FLUTICASONE PROPIONATE AND SALMETEROL 500; 50 UG/1; UG/1
1 POWDER RESPIRATORY (INHALATION) 2 TIMES DAILY
Qty: 1 EACH | Refills: 3 | Status: SHIPPED | OUTPATIENT
Start: 2022-10-04 | End: 2024-03-01

## 2022-10-04 ASSESSMENT — ENCOUNTER SYMPTOMS
HEMOPTYSIS: 0
CHILLS: 0
SPUTUM PRODUCTION: 1
SINUS PAIN: 0
DIZZINESS: 0
HEARTBURN: 0
FEVER: 0
WHEEZING: 0
HEADACHES: 0
COUGH: 1
PALPITATIONS: 0
SHORTNESS OF BREATH: 1
MYALGIAS: 0

## 2022-10-04 ASSESSMENT — FIBROSIS 4 INDEX: FIB4 SCORE: 0.88

## 2022-10-04 NOTE — ASSESSMENT & PLAN NOTE
Pulmonary embolism as a result of Moderna vaccine.  This is a common pulmonary diagnosis after covid and has also been documented in the literature as a result of vaccination.  In general this is treated as a provoked clot.  It appears that Dr. Leonard and Dr. Alvarado from [place] are ok with discontinuation of Eliquis.  Currently on 2.5BID.    Plan:  - VQ scan to assess for chronic clot as patient continues to have shortness of breath and feels he has not fully recovered  - Continue Eliquis 2.5 BID for now

## 2022-10-04 NOTE — ASSESSMENT & PLAN NOTE
Asthma flare since having Covid in July  Plan:  - Increase therapy to Advair 500 for now  - Xopenex as needed - Patient with increased palpitations with albuterol  - PFTs  - Recommend continuing allergy shots for now and he is already struggling with an asthma flare since July.    - IgE and CBC with diff  - Encourage exercise  - Encourage vaccination  - Follow up in 3 months

## 2022-10-06 ENCOUNTER — HOSPITAL ENCOUNTER (OUTPATIENT)
Dept: RADIOLOGY | Facility: MEDICAL CENTER | Age: 52
End: 2022-10-06
Attending: INTERNAL MEDICINE
Payer: COMMERCIAL

## 2022-10-06 DIAGNOSIS — I26.94 MULTIPLE SUBSEGMENTAL PULMONARY EMBOLI WITHOUT ACUTE COR PULMONALE (HCC): ICD-10-CM

## 2022-10-06 DIAGNOSIS — J45.40 MODERATE PERSISTENT ASTHMA, UNSPECIFIED WHETHER COMPLICATED: ICD-10-CM

## 2022-10-06 PROCEDURE — A9540 TC99M MAA: HCPCS

## 2022-10-06 PROCEDURE — 71045 X-RAY EXAM CHEST 1 VIEW: CPT

## 2022-10-21 ENCOUNTER — PATIENT MESSAGE (OUTPATIENT)
Dept: MEDICAL GROUP | Facility: PHYSICIAN GROUP | Age: 52
End: 2022-10-21
Payer: COMMERCIAL

## 2022-10-21 DIAGNOSIS — I10 ESSENTIAL HYPERTENSION: ICD-10-CM

## 2022-10-21 RX ORDER — LOSARTAN POTASSIUM AND HYDROCHLOROTHIAZIDE 12.5; 5 MG/1; MG/1
1 TABLET ORAL DAILY
Qty: 90 TABLET | Refills: 3 | Status: SHIPPED | OUTPATIENT
Start: 2022-10-21 | End: 2023-10-11

## 2022-11-03 ENCOUNTER — HOSPITAL ENCOUNTER (OUTPATIENT)
Dept: LAB | Facility: MEDICAL CENTER | Age: 52
End: 2022-11-03
Attending: INTERNAL MEDICINE
Payer: COMMERCIAL

## 2022-11-03 ENCOUNTER — HOSPITAL ENCOUNTER (OUTPATIENT)
Dept: LAB | Facility: MEDICAL CENTER | Age: 52
End: 2022-11-03
Attending: NURSE PRACTITIONER
Payer: COMMERCIAL

## 2022-11-03 ENCOUNTER — NON-PROVIDER VISIT (OUTPATIENT)
Dept: SLEEP MEDICINE | Facility: MEDICAL CENTER | Age: 52
End: 2022-11-03
Attending: INTERNAL MEDICINE
Payer: COMMERCIAL

## 2022-11-03 VITALS — WEIGHT: 224 LBS | HEIGHT: 71 IN | BODY MASS INDEX: 31.36 KG/M2

## 2022-11-03 DIAGNOSIS — J45.40 MODERATE PERSISTENT ASTHMA, UNSPECIFIED WHETHER COMPLICATED: ICD-10-CM

## 2022-11-03 PROCEDURE — 94726 PLETHYSMOGRAPHY LUNG VOLUMES: CPT | Performed by: INTERNAL MEDICINE

## 2022-11-03 PROCEDURE — 94060 EVALUATION OF WHEEZING: CPT | Performed by: INTERNAL MEDICINE

## 2022-11-03 PROCEDURE — 94729 DIFFUSING CAPACITY: CPT | Performed by: INTERNAL MEDICINE

## 2022-11-03 ASSESSMENT — PULMONARY FUNCTION TESTS
FEV1/FVC_PERCENT_CHANGE: -2
FEV1/FVC_PERCENT_PREDICTED: 79
FEV1/FVC: 66
FVC_LLN: 4.34
FEV1/FVC: 64.21
FEV1/FVC_PERCENT_PREDICTED: 81
FEV1/FVC_PERCENT_PREDICTED: 83
FVC_PERCENT_PREDICTED: 88
FVC_PERCENT_PREDICTED: 94
FEV1_PREDICTED: 4.09
FEV1_PERCENT_PREDICTED: 76
FVC: 4.58
FVC: 4.89
FEV1_LLN: 3.41
FEV1/FVC: 66
FEV1/FVC_PERCENT_LLN: 66
FEV1_PERCENT_CHANGE: 6
FEV1_PERCENT_CHANGE: 3
FEV1: 3.02
FEV1/FVC_PERCENT_PREDICTED: 83
FEV1/FVC: 64
FEV1: 3.14
FVC_PREDICTED: 5.19
FEV1/FVC_PREDICTED: 79
FEV1_PERCENT_PREDICTED: 73
FEV1/FVC_PERCENT_PREDICTED: 81
FEV1/FVC_PERCENT_CHANGE: 50

## 2022-11-03 ASSESSMENT — FIBROSIS 4 INDEX: FIB4 SCORE: 0.88

## 2022-11-03 NOTE — PROCEDURES
Technician: Alva Baker RRT, CPFT  Good patient effort & cooperation.  The results of this test meet the ATS/ERS standards for acceptability & reproducibility.  Test was performed on the [employer] Body Plethysmograph-Elite DX system.  Predicted equations for Spirometry are GLI-2012, ITS for lung volumes, and GLI-2017 for DLCO.  The DLCO was uncorrected for Hgb.  A bronchodilator of Xopenex HFA -2puffs via spacer administered.  DLCO performed during dilation period.    Pulmonary function test  Ordering physician: Awa Sprague  Interpreting physician: Awa Sprague  Reason for study asthma, history of PE    Results:  Spirometry:  FVC is 4.58 which is 88% predicted without a significant change postbronchodilator  FEV1 is 3.02 which is 73% predicted without a significant change postbronchodilator  FEV1/FVC is 64    Lung volumes:  TLC is 7.45 which is 103% predicted  RV is 2.87 which is 138% predicted    Diffusion capacity:  DLCO is 31.00 which is 101% predicted  DL/VA is 5 which is 108% predicted    Interpretation:  1.  Spirometry is significant for a moderate obstruction  2.  There is no significant change postbronchodilator  3.  The flow-volume loop is consistent with the spirometry data  4.  Lung volumes indicate air trapping without hyperinflation  5.  Diffusion capacity is normal  6.  There are no prior studies for comparison    Awa Sprague MD RD  Pulmonary and Critical Care    Available on [facility]

## 2022-11-04 ENCOUNTER — HOSPITAL ENCOUNTER (OUTPATIENT)
Dept: LAB | Facility: MEDICAL CENTER | Age: 52
End: 2022-11-04
Attending: NURSE PRACTITIONER
Payer: COMMERCIAL

## 2022-11-04 DIAGNOSIS — J45.40 MODERATE PERSISTENT ASTHMA, UNSPECIFIED WHETHER COMPLICATED: ICD-10-CM

## 2022-11-04 DIAGNOSIS — I10 ESSENTIAL HYPERTENSION: ICD-10-CM

## 2022-11-04 LAB
BASOPHILS # BLD AUTO: 0.6 % (ref 0–1.8)
BASOPHILS # BLD: 0.03 K/UL (ref 0–0.12)
EOSINOPHIL # BLD AUTO: 0.09 K/UL (ref 0–0.51)
EOSINOPHIL NFR BLD: 1.7 % (ref 0–6.9)
ERYTHROCYTE [DISTWIDTH] IN BLOOD BY AUTOMATED COUNT: 39.7 FL (ref 35.9–50)
ERYTHROCYTE [DISTWIDTH] IN BLOOD BY AUTOMATED COUNT: 39.9 FL (ref 35.9–50)
HCT VFR BLD AUTO: 47.7 % (ref 42–52)
HCT VFR BLD AUTO: 48 % (ref 42–52)
HGB BLD-MCNC: 16.4 G/DL (ref 14–18)
HGB BLD-MCNC: 16.5 G/DL (ref 14–18)
IMM GRANULOCYTES # BLD AUTO: 0.01 K/UL (ref 0–0.11)
IMM GRANULOCYTES NFR BLD AUTO: 0.2 % (ref 0–0.9)
LYMPHOCYTES # BLD AUTO: 1.3 K/UL (ref 1–4.8)
LYMPHOCYTES NFR BLD: 24 % (ref 22–41)
MCH RBC QN AUTO: 31.3 PG (ref 27–33)
MCH RBC QN AUTO: 31.3 PG (ref 27–33)
MCHC RBC AUTO-ENTMCNC: 34.4 G/DL (ref 33.7–35.3)
MCHC RBC AUTO-ENTMCNC: 34.4 G/DL (ref 33.7–35.3)
MCV RBC AUTO: 91 FL (ref 81.4–97.8)
MCV RBC AUTO: 91.1 FL (ref 81.4–97.8)
MONOCYTES # BLD AUTO: 0.35 K/UL (ref 0–0.85)
MONOCYTES NFR BLD AUTO: 6.5 % (ref 0–13.4)
NEUTROPHILS # BLD AUTO: 3.63 K/UL (ref 1.82–7.42)
NEUTROPHILS NFR BLD: 67 % (ref 44–72)
NRBC # BLD AUTO: 0 K/UL
NRBC BLD-RTO: 0 /100 WBC
PLATELET # BLD AUTO: 206 K/UL (ref 164–446)
PLATELET # BLD AUTO: 215 K/UL (ref 164–446)
PMV BLD AUTO: 10.9 FL (ref 9–12.9)
PMV BLD AUTO: 10.9 FL (ref 9–12.9)
RBC # BLD AUTO: 5.24 M/UL (ref 4.7–6.1)
RBC # BLD AUTO: 5.27 M/UL (ref 4.7–6.1)
WBC # BLD AUTO: 5.4 K/UL (ref 4.8–10.8)
WBC # BLD AUTO: 5.5 K/UL (ref 4.8–10.8)

## 2022-11-04 PROCEDURE — 85027 COMPLETE CBC AUTOMATED: CPT

## 2022-11-04 PROCEDURE — 80053 COMPREHEN METABOLIC PANEL: CPT

## 2022-11-04 PROCEDURE — 82785 ASSAY OF IGE: CPT

## 2022-11-04 PROCEDURE — 36415 COLL VENOUS BLD VENIPUNCTURE: CPT

## 2022-11-04 PROCEDURE — 80061 LIPID PANEL: CPT

## 2022-11-04 PROCEDURE — 85025 COMPLETE CBC W/AUTO DIFF WBC: CPT

## 2022-11-05 LAB
ALBUMIN SERPL BCP-MCNC: 4.4 G/DL (ref 3.2–4.9)
ALBUMIN/GLOB SERPL: 1.6 G/DL
ALP SERPL-CCNC: 66 U/L (ref 30–99)
ALT SERPL-CCNC: 21 U/L (ref 2–50)
ANION GAP SERPL CALC-SCNC: 11 MMOL/L (ref 7–16)
AST SERPL-CCNC: 16 U/L (ref 12–45)
BILIRUB SERPL-MCNC: 1.2 MG/DL (ref 0.1–1.5)
BUN SERPL-MCNC: 15 MG/DL (ref 8–22)
CALCIUM SERPL-MCNC: 9.2 MG/DL (ref 8.5–10.5)
CHLORIDE SERPL-SCNC: 103 MMOL/L (ref 96–112)
CHOLEST SERPL-MCNC: 222 MG/DL (ref 100–199)
CO2 SERPL-SCNC: 25 MMOL/L (ref 20–33)
CREAT SERPL-MCNC: 1.12 MG/DL (ref 0.5–1.4)
FASTING STATUS PATIENT QL REPORTED: NORMAL
GFR SERPLBLD CREATININE-BSD FMLA CKD-EPI: 80 ML/MIN/1.73 M 2
GLOBULIN SER CALC-MCNC: 2.8 G/DL (ref 1.9–3.5)
GLUCOSE SERPL-MCNC: 78 MG/DL (ref 65–99)
HDLC SERPL-MCNC: 47 MG/DL
LDLC SERPL CALC-MCNC: 155 MG/DL
POTASSIUM SERPL-SCNC: 4.1 MMOL/L (ref 3.6–5.5)
PROT SERPL-MCNC: 7.2 G/DL (ref 6–8.2)
SODIUM SERPL-SCNC: 139 MMOL/L (ref 135–145)
TRIGL SERPL-MCNC: 101 MG/DL (ref 0–149)

## 2022-11-07 LAB — IGE SERPL-ACNC: 1235 KU/L

## 2022-11-11 ENCOUNTER — OFFICE VISIT (OUTPATIENT)
Dept: MEDICAL GROUP | Facility: PHYSICIAN GROUP | Age: 52
End: 2022-11-11
Payer: COMMERCIAL

## 2022-11-11 VITALS
OXYGEN SATURATION: 94 % | HEART RATE: 84 BPM | RESPIRATION RATE: 16 BRPM | DIASTOLIC BLOOD PRESSURE: 86 MMHG | TEMPERATURE: 98.4 F | BODY MASS INDEX: 30.8 KG/M2 | SYSTOLIC BLOOD PRESSURE: 142 MMHG | HEIGHT: 71 IN | WEIGHT: 220 LBS

## 2022-11-11 DIAGNOSIS — Z12.11 COLON CANCER SCREENING: ICD-10-CM

## 2022-11-11 DIAGNOSIS — J45.30 MILD PERSISTENT EXTRINSIC ASTHMA WITHOUT COMPLICATION: ICD-10-CM

## 2022-11-11 DIAGNOSIS — E78.5 HYPERLIPIDEMIA, UNSPECIFIED HYPERLIPIDEMIA TYPE: ICD-10-CM

## 2022-11-11 PROCEDURE — 99214 OFFICE O/P EST MOD 30 MIN: CPT | Performed by: NURSE PRACTITIONER

## 2022-11-11 ASSESSMENT — FIBROSIS 4 INDEX: FIB4 SCORE: 0.85

## 2022-11-11 NOTE — PROGRESS NOTES
Subjective:     CC:   Chief Complaint   Patient presents with    Follow-Up     Cough post covid.        HPI:   Patient is a 52 y.o. established male patient with medical history listed below here today to review annual labs.   - With regards to his asthma and cough, he was referred to pulmonology at our last visit. He met with Dr Sprague/[facility] on 10/4/2022; he is currently on Advair 500-50 with prn xopenex (dulera d/rosales); he continues to see allergist for IJ, states did not receive any at their last appointment. He also continues on Eliquis 2.5mg BID for post-COVID vaccine PE.    Hyperlipidemia, unspecified hyperlipidemia type  Not currently on cholesterol reducing medication   Also with hx HTN      Patient Active Problem List   Diagnosis    Allergic rhinitis    Allergic asthma    Eosinophilic esophagitis    GERD (gastroesophageal reflux disease)    Hx pulmonary embolism    Essential hypertension    Elevated LFTs    HLD (hyperlipidemia)    Skin lesion of face    Pulmonary embolism (HCC)    Sleep trouble    Chronic pain of right knee    History of palpitations       Past Medical History:   Diagnosis Date    Allergic rhinitis     Anesthesia     doesn't do well with novacaine/lidocaine, ponv    Arthritis     Asthma     Asthma     Cough     Eosinophilic esophagitis 2011    Per GI Biopsy    GERD (gastroesophageal reflux disease)     History of pulmonary embolus (PE) 01/2021    Indigestion     Shortness of breath     Sputum production     Wheezing         Past Surgical History:   Procedure Laterality Date    INGUINAL HERNIA LAPAROSCOPIC  8/29/2012    Performed by ROLAND POLLOCK at SURGERY [facility]S    INGUINAL HERNIA REPAIR  11/30/2011    Performed by ROLAND POLLOCK at SURGERY [facility] ORS    EGD WITH ASP/BX  10/11    Dr Brice.  BX negative for Piper's        Current Outpatient Medications on File Prior to Visit   Medication Sig Dispense Refill    losartan-hydrochlorothiazide (HYZAAR) 50-12.5  "MG per tablet Take 1 Tablet by mouth every day. 90 Tablet 3    fluticasone-salmeterol (ADVAIR DISKUS) 500-50 MCG/ACT AEROSOL POWDER, BREATH ACTIVATED Inhale 1 Puff 2 times a day. Rinse mouth after each use. 1 Each 3    levalbuterol (XOPENEX HFA) 45 MCG/ACT inhaler Inhale 2 Puffs every four hours as needed for Shortness of Breath. 1 Each 3    ELIQUIS 2.5 MG Tab TAKE 1 TABLET BY MOUTH TWICE DAILY 60 Tablet 2    omeprazole (PRILOSEC) 20 MG delayed-release capsule Take 1 Capsule by mouth every day.      cetirizine (ZYRTEC) 10 MG TABS Take 1 Tablet by mouth every day.      albuterol 108 (90 Base) MCG/ACT Aero Soln inhalation aerosol Inhale 2 Puffs every four hours as needed for Shortness of Breath. (Patient not taking: Reported on 11/11/2022) 8.5 g 3     No current facility-administered medications on file prior to visit.        Health Maintenance: Completed    ROS:  Review of Systems   Constitutional: Negative.  Negative for fever and malaise/fatigue.   HENT: Negative.     Eyes: Negative.    Respiratory:  Negative for cough, sputum production and shortness of breath.    Cardiovascular:  Negative for chest pain, palpitations and leg swelling.   Gastrointestinal: Negative.    Genitourinary: Negative.    Musculoskeletal: Negative.    Neurological: Negative.    Endo/Heme/Allergies: Negative.      Objective:     Exam:  BP (!) 142/86   Pulse 84   Temp 36.9 °C (98.4 °F)   Resp 16   Ht 1.803 m (5' 11\")   Wt 99.8 kg (220 lb)   SpO2 94%   BMI 30.68 kg/m²  Body mass index is 30.68 kg/m².    Physical Exam  Constitutional:       Appearance: Normal appearance.   Cardiovascular:      Rate and Rhythm: Normal rate and regular rhythm.      Pulses: Normal pulses.      Heart sounds: Normal heart sounds.   Pulmonary:      Effort: Pulmonary effort is normal.      Breath sounds: Normal breath sounds.   Musculoskeletal:      Cervical back: Normal range of motion and neck supple.      Right lower leg: No edema.      Left lower leg: No " edema.   Skin:     General: Skin is warm and dry.   Neurological:      General: No focal deficit present.      Mental Status: He is alert and oriented to person, place, and time.       Labs: reviewed with patient; questions answered    Assessment & Plan:     52 y.o. male with the following -   1. Hyperlipidemia, unspecified hyperlipidemia type     Latest Reference Range & Units 11/04/22 10:43   Cholesterol,Tot 100 - 199 mg/dL 222 (H)   Triglycerides 0 - 149 mg/dL 101   HDL >=40 mg/dL 47   LDL <100 mg/dL 155 (H)     The 10-year ASCVD risk score (Son CAVANAUGH, et al., 2019) is: 6%  - he is working on lifestyle changes at this time. States he recently joined weight [employer] and has lost weight  - heart healthy eating; stay active  - recheck fasting lipid in 6 months  - Lipid Profile; Future    2. Colon cancer screening  Due for first screen; discussed options for screening and he has elected cologuard testing  - COLOGUARD (FIT DNA)    3. Mild persistent extrinsic asthma without complication  Continue management plan per pulmonology  - Advair 500-50 1puff BID; prn xopenex  - Allergy IJ per allergist    Return in about 6 months (around 5/11/2023) for HTN, hyperlidemia.    Please note that this dictation was created using voice recognition software. I have made every reasonable attempt to correct obvious errors, but I expect that there are errors of grammar and possibly content that I did not discover before finalizing the note.

## 2022-11-13 ASSESSMENT — ENCOUNTER SYMPTOMS
PALPITATIONS: 0
FEVER: 0
NEUROLOGICAL NEGATIVE: 1
GASTROINTESTINAL NEGATIVE: 1
MUSCULOSKELETAL NEGATIVE: 1
COUGH: 0
SPUTUM PRODUCTION: 0
CONSTITUTIONAL NEGATIVE: 1
SHORTNESS OF BREATH: 0
EYES NEGATIVE: 1

## 2022-11-13 NOTE — ASSESSMENT & PLAN NOTE
Latest Reference Range & Units 11/04/22 10:43   Cholesterol,Tot 100 - 199 mg/dL 222 (H)   Triglycerides 0 - 149 mg/dL 101   HDL >=40 mg/dL 47   LDL <100 mg/dL 155 (H)     The 10-year ASCVD risk score (Son CAVANAUGH, et al., 2019) is: 6%  - he is working on lifestyle changes at this time. States he recently joined weight [employer] and has lost weight  - heart healthy eating; stay active  - recheck fasting lipid in 6 months

## 2022-12-07 ENCOUNTER — APPOINTMENT (OUTPATIENT)
Dept: HEMATOLOGY ONCOLOGY | Facility: MEDICAL CENTER | Age: 52
End: 2022-12-07
Payer: COMMERCIAL

## 2023-01-11 ENCOUNTER — APPOINTMENT (OUTPATIENT)
Dept: HEMATOLOGY ONCOLOGY | Facility: MEDICAL CENTER | Age: 53
End: 2023-01-11
Payer: COMMERCIAL

## 2023-01-18 ENCOUNTER — OFFICE VISIT (OUTPATIENT)
Dept: SLEEP MEDICINE | Facility: MEDICAL CENTER | Age: 53
End: 2023-01-18
Payer: COMMERCIAL

## 2023-01-18 VITALS
BODY MASS INDEX: 31.15 KG/M2 | WEIGHT: 222.5 LBS | HEART RATE: 74 BPM | SYSTOLIC BLOOD PRESSURE: 120 MMHG | HEIGHT: 71 IN | OXYGEN SATURATION: 97 % | DIASTOLIC BLOOD PRESSURE: 88 MMHG

## 2023-01-18 DIAGNOSIS — J45.30 MILD PERSISTENT EXTRINSIC ASTHMA WITHOUT COMPLICATION: ICD-10-CM

## 2023-01-18 DIAGNOSIS — Z86.711 HX PULMONARY EMBOLISM: ICD-10-CM

## 2023-01-18 PROCEDURE — 99215 OFFICE O/P EST HI 40 MIN: CPT | Performed by: INTERNAL MEDICINE

## 2023-01-18 ASSESSMENT — PATIENT HEALTH QUESTIONNAIRE - PHQ9: CLINICAL INTERPRETATION OF PHQ2 SCORE: 0

## 2023-01-18 ASSESSMENT — FIBROSIS 4 INDEX: FIB4 SCORE: 0.85

## 2023-01-18 NOTE — PROGRESS NOTES
"Pulmonary Consult    Date of Consult: 1/18/23    Reason for consult: f/u spirometry; history of asthma, history of bilateral PE after COVID 19 vaccination, history of COVID in 2022, seasonal allergies     Chief Complaint:  Chief Complaint   Patient presents with    Asthma     Last seen 10/04/22    Results     Labs 11/04/22, VQ Scan 10/06/22, PFT 11/03/22     HPI:   Prakash Kaur is a very pleasant 52 y.o. male previously seen by Dr. Sprague in pulmonary clinic. He was referred by nurse practitioner Ivet Danielle for persistent cough after COVID 19.     From Oct 2022: \"referred to the pulmonary clinic for Cough.  Patient has a lifelong history of asthma diagnosed as a child.  He has most recently been managed with Dulera maintenance therapy with albuterol as needed.  He is a .  He was vaccinated for COVID in January 2021 and developed bilateral PEs after the COVID vaccination.  He was placed on Eliquis and followed by hematology and remains on Eliquis 2.5 twice daily currently.  He then contracted COVID in July 2022 and had severe pleurisy which has since resolved with a severe cough which is better but not gone.  He states he still coughs mostly in the morning with increased phlegm.  He has exercise intolerance and has been unable to work out like he used to.  He has a history of severe allergies for which he has been taking allergy shots for years.  His allergist has recently stopped these shots to see how he does off this therapy.       After his PE he saw Dr. Leonard and then he went to [place] [facility] where it was recommended that he stay on eliquis until summer of 2022 and then follow up.  Dr. Leonard had decreased the eliquis to 2.5 BID.  His wife accompanies him today and states she feels like he never fully recovered from his PE.  He is a never smoker but has been exposed to some wildfires at his job as a .  \"    Oct 2022: Advair increased to 500 mg; xopenex PRN d/t palpitations " with Albuterol, continue allergy shots, checked CBC and IgE, ok to d/c Eliquis, VQ scan ordered d/t persistent symptoms, and PFT ordered.     2022 PFT: Mild obstruction; NBDR, air trapping on fulls, normal DLCO.  IgE: 1235  CBC: unremarkable    VQ: unremarkable    he has undergone a formal allergic workup and follows with an allergist.   He takes Zyrtec 10 mg daily     Today: States he feels significantly better.  He still on the Eliquis.  Still taking the Advair and as needed Xopenex.  He has no pulmonary review of systems complaints today.      Past Medical History:   Diagnosis Date    Allergic rhinitis     Anesthesia     doesn't do well with novacaine/lidocaine, ponv    Arthritis     Asthma     Asthma     Cough     Eosinophilic esophagitis     Per GI Biopsy    GERD (gastroesophageal reflux disease)     History of pulmonary embolus (PE) 2021    Indigestion     Shortness of breath     Sputum production     Wheezing        Past Surgical History:   Procedure Laterality Date    INGUINAL HERNIA LAPAROSCOPIC  2012    Performed by ROLAND POLLOCK at SURGERY [facility]S    INGUINAL HERNIA REPAIR  2011    Performed by ROLAND POLLOCK at SURGERY [facility] OR[facility]    EGD WITH ASP/BX  10/11    Dr Brice.  BX negative for Piper's       Social History     Socioeconomic History    Marital status:      Spouse name: Not on file    Number of children: Not on file    Years of education: Not on file    Highest education level: Not on file   Occupational History    Occupation:      Employer: [employer]   Tobacco Use    Smoking status: Former     Types: Cigarettes     Quit date: 2007     Years since quittin.0    Smokeless tobacco: Former     Types: Chew     Quit date: 2007   Vaping Use    Vaping Use: Never used   Substance and Sexual Activity    Alcohol use: Yes     Alcohol/week: 1.8 oz     Types: 1 Glasses of wine, 1 Cans of beer, 1 Shots of liquor per week      Comment: often 2/d, but sometimes 4/d on weekends. -    Drug use: No    Sexual activity: Yes     Partners: Female   Other Topics Concern    Not on file   Social History Narrative    ** Merged History Encounter **          Social Determinants of Health     Financial Resource Strain: Not on file   Food Insecurity: Not on file   Transportation Needs: Not on file   Physical Activity: Not on file   Stress: Not on file   Social Connections: Not on file   Intimate Partner Violence: Not on file   Housing Stability: Not on file          Family History   Problem Relation Age of Onset    Heart Disease Mother     Hypertension Father     Stroke Paternal Uncle     Heart Disease Maternal Grandfather          of heart attack at 55       Current Outpatient Medications on File Prior to Visit   Medication Sig Dispense Refill    losartan-hydrochlorothiazide (HYZAAR) 50-12.5 MG per tablet Take 1 Tablet by mouth every day. 90 Tablet 3    fluticasone-salmeterol (ADVAIR DISKUS) 500-50 MCG/ACT AEROSOL POWDER, BREATH ACTIVATED Inhale 1 Puff 2 times a day. Rinse mouth after each use. 1 Each 3    levalbuterol (XOPENEX HFA) 45 MCG/ACT inhaler Inhale 2 Puffs every four hours as needed for Shortness of Breath. 1 Each 3    albuterol 108 (90 Base) MCG/ACT Aero Soln inhalation aerosol Inhale 2 Puffs every four hours as needed for Shortness of Breath. (Patient not taking: Reported on 2022) 8.5 g 3    ELIQUIS 2.5 MG Tab TAKE 1 TABLET BY MOUTH TWICE DAILY 60 Tablet 2    omeprazole (PRILOSEC) 20 MG delayed-release capsule Take 1 Capsule by mouth every day.      cetirizine (ZYRTEC) 10 MG TABS Take 1 Tablet by mouth every day.       No current facility-administered medications on file prior to visit.       Allergies: Amlodipine      ROS: A 12 point ROS was performed on intake and during my interview. ROS negative unless specifically noted in HPI.     Vitals:  /88 (BP Location: Left arm, Patient Position: Sitting, BP Cuff Size: Adult)   " Pulse 74   Ht 1.803 m (5' 11\")   Wt 101 kg (222 lb 8 oz)   SpO2 97%     Physical Exam:  Physical Exam  Vitals reviewed.   Constitutional:       General: He is not in acute distress.     Appearance: Normal appearance. He is not ill-appearing, toxic-appearing or diaphoretic.   Eyes:      General: No scleral icterus.        Right eye: No discharge.         Left eye: No discharge.      Conjunctiva/sclera: Conjunctivae normal.   Cardiovascular:      Rate and Rhythm: Normal rate and regular rhythm.      Heart sounds: No murmur heard.  Pulmonary:      Effort: Pulmonary effort is normal.      Breath sounds: Normal breath sounds.   Musculoskeletal:      Right lower leg: No edema.      Left lower leg: No edema.   Neurological:      General: No focal deficit present.      Mental Status: He is alert and oriented to person, place, and time.   Psychiatric:         Mood and Affect: Mood normal.         Behavior: Behavior normal.       Laboratory Data: I personally reviewed labs including historical lab trends.     Assessment/Plan:    Pulmonary problem list:  #Allergic asthma-severe persistent-controlled  #History of provoke bilateral PE - on chronic anticoagulation  #Seasonal allergies    I went over his spirometry and his VQ scan with him.  His VQ scan was negative.  His spirometry was technically not obstructed by NHANES criteria, but was mildly obstructed by ATS criteria.  He does have scooping of the expiratory loop.  He also has signs of air trapping on full lung volumes.  Emphasized the importance of continued vigilant use of Advair daily.  He should also use Xopenex pretty liberally.  I am reluctant to step down from current Advair dose today as a result of his spirometry in addition to having a rather rough year.  I did tell him that if he continues to be well controlled for the next 9 months consider stepping down therapy.  He does state his last prednisone dose was several years ago. His IgE was significantly " elevated, but he has no symtpoms today to suggest ABPA or uncontrolled asthma.    Discussed his anticoagulation.He is on the fence as to whether or not he wants to continue it.  He is set to see his third hematologist soon.  He states he has had differing advice from various hematologists. Hypercoag workup was negative but he does have history of PE in a 1st degree relative.  I will defer this decision to him and his hematologist, but I did express that continued anticoagulation is a reasonable option if he is not going to be doing high risk activities for bleeding or head trauma.  He did note that he is likely retiring soon.    Total consult time was 45 min. This includes reviewing previous provider notes, personally reviewing previous imaging and spirometry, educating the patient about their disease process, and inhaler education.   __________  Ge Tony, DO  Pulmonary and Critical Care Medicine  [facility]    Please note that this dictation was created using voice recognition software. The accuracy of the dictation is limited to the abilities of the software. I have made every reasonable attempt to correct obvious errors, but I expect that there are errors of grammar and possibly content that I did not discover before finalizing the note.

## 2023-02-03 ENCOUNTER — HOSPITAL ENCOUNTER (OUTPATIENT)
Dept: HEMATOLOGY ONCOLOGY | Facility: MEDICAL CENTER | Age: 53
End: 2023-02-03
Attending: STUDENT IN AN ORGANIZED HEALTH CARE EDUCATION/TRAINING PROGRAM
Payer: COMMERCIAL

## 2023-02-03 VITALS
RESPIRATION RATE: 20 BRPM | HEART RATE: 79 BPM | DIASTOLIC BLOOD PRESSURE: 84 MMHG | TEMPERATURE: 98.4 F | HEIGHT: 71 IN | OXYGEN SATURATION: 95 % | SYSTOLIC BLOOD PRESSURE: 120 MMHG | WEIGHT: 222 LBS | BODY MASS INDEX: 31.08 KG/M2

## 2023-02-03 DIAGNOSIS — I26.94 MULTIPLE SUBSEGMENTAL PULMONARY EMBOLI WITHOUT ACUTE COR PULMONALE (HCC): ICD-10-CM

## 2023-02-03 PROCEDURE — 99213 OFFICE O/P EST LOW 20 MIN: CPT | Performed by: STUDENT IN AN ORGANIZED HEALTH CARE EDUCATION/TRAINING PROGRAM

## 2023-02-03 PROCEDURE — 99212 OFFICE O/P EST SF 10 MIN: CPT | Performed by: STUDENT IN AN ORGANIZED HEALTH CARE EDUCATION/TRAINING PROGRAM

## 2023-02-03 ASSESSMENT — ENCOUNTER SYMPTOMS
DOUBLE VISION: 0
FEVER: 0
ORTHOPNEA: 0
WEIGHT LOSS: 0
SHORTNESS OF BREATH: 0
DIZZINESS: 0
HEADACHES: 0
TINGLING: 0
BACK PAIN: 0
CONSTIPATION: 0
PALPITATIONS: 0
INSOMNIA: 0
SINUS PAIN: 0
ABDOMINAL PAIN: 0
NAUSEA: 0
DIARRHEA: 0
BRUISES/BLEEDS EASILY: 0
HEARTBURN: 0
NERVOUS/ANXIOUS: 0
MYALGIAS: 0
BLURRED VISION: 0
COUGH: 0
SORE THROAT: 0
BLOOD IN STOOL: 0
CHILLS: 0
WHEEZING: 0
VOMITING: 0
DIAPHORESIS: 0
WEAKNESS: 0
SPUTUM PRODUCTION: 0

## 2023-02-03 ASSESSMENT — PAIN SCALES - GENERAL: PAINLEVEL: NO PAIN

## 2023-02-03 ASSESSMENT — FIBROSIS 4 INDEX: FIB4 SCORE: 0.85

## 2023-02-04 NOTE — PROGRESS NOTES
Follow Up Note:  Hematology/Oncology      Primary Care:  Margarito Adan M.D.    Diagnosis: Pulmonary embolism after COVID 19 vaccination    Chief Complaint: Follow up on anticoagulation    Current Treatment: Apixaban 2.5 mg PO BID    Prior Treatment: NA    Oncology History of Presenting Illness:  Prakash Kaur is a 51 y.o.  man who presents to the clinic for follow up, having developed pulmonary emboli a few days after receiving the COVID 19 vaccine. He was started on anticoagulation and had a partial hypercoagulable work up, which was negative. He has no family history of clotting disorders. He has tolerated apixaban well and has had no complaints. He has received the second COVID 19 vaccine and had no issues with it. He has been on modified duty as a , and has done well thus far.     Treatment History:   12/30/2020: Moderna vaccine  01/04/21: PE diagnosed, started on apixaban 5 mg PO BID    Interval History:  Patient is here for follow up visit.  He feels well and has no complaints. He is uncertain about coming off the anticoagulation entirely. He is tolerating it well with no complaints.     Allergies as of 02/03/2023 - Reviewed 02/03/2023   Allergen Reaction Noted    Amlodipine  09/14/2021         Current Outpatient Medications:     losartan-hydrochlorothiazide (HYZAAR) 50-12.5 MG per tablet, Take 1 Tablet by mouth every day., Disp: 90 Tablet, Rfl: 3    fluticasone-salmeterol (ADVAIR DISKUS) 500-50 MCG/ACT AEROSOL POWDER, BREATH ACTIVATED, Inhale 1 Puff 2 times a day. Rinse mouth after each use., Disp: 1 Each, Rfl: 3    levalbuterol (XOPENEX HFA) 45 MCG/ACT inhaler, Inhale 2 Puffs every four hours as needed for Shortness of Breath., Disp: 1 Each, Rfl: 3    ELIQUIS 2.5 MG Tab, TAKE 1 TABLET BY MOUTH TWICE DAILY, Disp: 60 Tablet, Rfl: 2    omeprazole (PRILOSEC) 20 MG delayed-release capsule, Take 1 Capsule by mouth every day., Disp: , Rfl:     cetirizine (ZYRTEC) 10 MG TABS,  "Take 1 Tablet by mouth every day., Disp: , Rfl:       Review of Systems:  Review of Systems   Constitutional:  Negative for chills, diaphoresis, fever, malaise/fatigue and weight loss.   HENT:  Negative for hearing loss, nosebleeds, sinus pain and sore throat.    Eyes:  Negative for blurred vision and double vision.   Respiratory:  Negative for cough, sputum production, shortness of breath and wheezing.    Cardiovascular:  Negative for chest pain, palpitations, orthopnea and leg swelling.   Gastrointestinal:  Negative for abdominal pain, blood in stool, constipation, diarrhea, heartburn, melena, nausea and vomiting.   Genitourinary:  Negative for dysuria, frequency, hematuria and urgency.   Musculoskeletal:  Negative for back pain, joint pain and myalgias.   Skin:  Negative for rash.   Neurological:  Negative for dizziness, tingling, weakness and headaches.   Endo/Heme/Allergies:  Does not bruise/bleed easily.   Psychiatric/Behavioral:  The patient is not nervous/anxious and does not have insomnia.        Physical Exam:  Vitals:    02/03/23 1420   BP: 120/84   BP Location: Left arm   Patient Position: Sitting   BP Cuff Size: Large adult   Pulse: 79   Resp: 20   Temp: 36.9 °C (98.4 °F)   TempSrc: Temporal   SpO2: 95%   Weight: 101 kg (222 lb)   Height: 1.803 m (5' 11\")       DESC; KARNOFSKY SCALE WITH ECOG EQUIVALENT: 100, Fully active, able to carry on all pre-disease performed without restriction (ECOG equivalent 0)    DISTRESS LEVEL: no apparent distress    Physical Exam  Vitals and nursing note reviewed.   Constitutional:       General: He is awake. He is not in acute distress.     Appearance: Normal appearance. He is normal weight. He is not ill-appearing, toxic-appearing or diaphoretic.   HENT:      Head: Normocephalic and atraumatic.      Nose: Nose normal. No congestion.      Mouth/Throat:      Pharynx: Oropharynx is clear. No oropharyngeal exudate or posterior oropharyngeal erythema.   Eyes:      General: No " scleral icterus.     Extraocular Movements: Extraocular movements intact.      Conjunctiva/sclera: Conjunctivae normal.      Pupils: Pupils are equal, round, and reactive to light.   Cardiovascular:      Rate and Rhythm: Normal rate and regular rhythm.      Pulses: Normal pulses.      Heart sounds: Normal heart sounds. No murmur heard.    No friction rub. No gallop.   Pulmonary:      Effort: Pulmonary effort is normal.      Breath sounds: Normal breath sounds. No decreased air movement. No wheezing, rhonchi or rales.   Abdominal:      General: Bowel sounds are normal. There is no distension.      Tenderness: There is no abdominal tenderness.   Musculoskeletal:         General: No deformity. Normal range of motion.      Cervical back: Normal range of motion and neck supple. No tenderness.      Right lower leg: No edema.      Left lower leg: No edema.   Lymphadenopathy:      Cervical: No cervical adenopathy.      Upper Body:      Right upper body: No axillary adenopathy.      Left upper body: No axillary adenopathy.      Lower Body: No right inguinal adenopathy. No left inguinal adenopathy.   Skin:     General: Skin is warm and dry.      Coloration: Skin is not jaundiced.      Findings: No erythema or rash.   Neurological:      General: No focal deficit present.      Mental Status: He is alert and oriented to person, place, and time.      Sensory: Sensation is intact.      Motor: Motor function is intact. No weakness.      Gait: Gait is intact.   Psychiatric:         Attention and Perception: Attention normal.         Mood and Affect: Mood normal.         Behavior: Behavior normal. Behavior is cooperative.         Thought Content: Thought content normal.         Judgment: Judgment normal.         Labs:  No visits with results within 7 Day(s) from this visit.   Latest known visit with results is:   Hospital Outpatient Visit on 11/04/2022   Component Date Value Ref Range Status    WBC 11/04/2022 5.4  4.8 - 10.8 K/uL  Final    RBC 11/04/2022 5.24  4.70 - 6.10 M/uL Final    Hemoglobin 11/04/2022 16.4  14.0 - 18.0 g/dL Final    Hematocrit 11/04/2022 47.7  42.0 - 52.0 % Final    MCV 11/04/2022 91.0  81.4 - 97.8 fL Final    MCH 11/04/2022 31.3  27.0 - 33.0 pg Final    MCHC 11/04/2022 34.4  33.7 - 35.3 g/dL Final    RDW 11/04/2022 39.7  35.9 - 50.0 fL Final    Platelet Count 11/04/2022 215  164 - 446 K/uL Final    MPV 11/04/2022 10.9  9.0 - 12.9 fL Final    Neutrophils-Polys 11/04/2022 67.00  44.00 - 72.00 % Final    Lymphocytes 11/04/2022 24.00  22.00 - 41.00 % Final    Monocytes 11/04/2022 6.50  0.00 - 13.40 % Final    Eosinophils 11/04/2022 1.70  0.00 - 6.90 % Final    Basophils 11/04/2022 0.60  0.00 - 1.80 % Final    Immature Granulocytes 11/04/2022 0.20  0.00 - 0.90 % Final    Nucleated RBC 11/04/2022 0.00  /100 WBC Final    Neutrophils (Absolute) 11/04/2022 3.63  1.82 - 7.42 K/uL Final    Includes immature neutrophils, if present.    Lymphs (Absolute) 11/04/2022 1.30  1.00 - 4.80 K/uL Final    Monos (Absolute) 11/04/2022 0.35  0.00 - 0.85 K/uL Final    Eos (Absolute) 11/04/2022 0.09  0.00 - 0.51 K/uL Final    Baso (Absolute) 11/04/2022 0.03  0.00 - 0.12 K/uL Final    Immature Granulocytes (abs) 11/04/2022 0.01  0.00 - 0.11 K/uL Final    NRBC (Absolute) 11/04/2022 0.00  K/uL Final    Ige, Qn 11/04/2022 1235 (H)  <=214 kU/L Final    Comment: REFERENCE INTERVAL: Immunoglobulin E, Serum  Access complete set of age- and/or gender-specific reference  intervals for this test in the [facility]  ([email or web address]).  Performed By: [facility]  [address] [place] [id number]  : Zackery Bowman MD, PhD           Imaging:     All listed images below have been independently reviewed by me. I agree with the findings as summarized below:    No results found.    Pathology:  NA    Assessment & Plan:  1. Multiple subsegmental pulmonary emboli without acute cor pulmonale (HCC)            This  is a now 52 year old  man with a history of PE, seemingly provoked by the Moderna COVID vaccine. He has been on apixaban and has tolerated this well.     Current Diagnosis and Staging: Provoked pulmonary embolism    Update: Patient tolerating prophylactic dosing well. As his PE was seemingly provoked by the COVID vaccine, we discussed the pros and cons of coming off of therapy and he has elected to stay on the prophylactic dose at this time, considering that he developed pleurisy when he got infected with COVID in the summer time. He will return in 6 months for follow up and reassessment.    Treatment Plan: Apixaban 2.5 mg PO BID, with consideration for discontinuation after 6 months.     Treatment Citation: NA    Plan of Care:    Primary Therapy: Apixaban 2.5 mg PO BID (prophylactic dosing).   Supportive Therapy: NA  Toxicity: NA  Labs: CBC with diff, CMP annually  Imaging: NA  Treatment Planning: Possibly provoked PE in setting of COVID vaccine (though rare occurrence). Patient wishes to remain on prophylactic dosing of apixaban at this time. Reassess in 6 months.   Consultations: NA  Code Status: Full  Miscellaneous: Patient should remain on modified duty until he comes off of apixaban altogether.   Return for Follow Up: 6 months    Any questions and concerns raised by the patient were answered to the best of my ability. Thank you for allowing me to participate in the care for this patient. Please feel free to contact me for any questions or concerns.       Total time spent on chart review, clinic encounter, and documentation: 25 minutes.

## 2023-04-17 ENCOUNTER — TELEPHONE (OUTPATIENT)
Dept: HEMATOLOGY ONCOLOGY | Facility: MEDICAL CENTER | Age: 53
End: 2023-04-17
Payer: COMMERCIAL

## 2023-06-22 RX ORDER — APIXABAN 5 MG/1
TABLET, FILM COATED ORAL
COMMUNITY
Start: 2023-04-17 | End: 2023-06-22

## 2023-06-22 RX ORDER — FLUTICASONE FUROATE, UMECLIDINIUM BROMIDE AND VILANTEROL TRIFENATATE 200; 62.5; 25 UG/1; UG/1; UG/1
POWDER RESPIRATORY (INHALATION)
COMMUNITY
Start: 2023-04-14

## 2023-06-22 RX ORDER — OMEPRAZOLE 40 MG/1
CAPSULE, DELAYED RELEASE ORAL
COMMUNITY
Start: 2023-04-14

## 2023-06-23 ENCOUNTER — OFFICE VISIT (OUTPATIENT)
Dept: MEDICAL GROUP | Facility: PHYSICIAN GROUP | Age: 53
End: 2023-06-23
Payer: COMMERCIAL

## 2023-06-23 VITALS
SYSTOLIC BLOOD PRESSURE: 122 MMHG | HEART RATE: 93 BPM | BODY MASS INDEX: 29.96 KG/M2 | OXYGEN SATURATION: 100 % | WEIGHT: 214 LBS | DIASTOLIC BLOOD PRESSURE: 74 MMHG | TEMPERATURE: 98.2 F | HEIGHT: 71 IN

## 2023-06-23 DIAGNOSIS — B02.9 HERPES ZOSTER WITHOUT COMPLICATION: ICD-10-CM

## 2023-06-23 PROCEDURE — 3074F SYST BP LT 130 MM HG: CPT | Performed by: NURSE PRACTITIONER

## 2023-06-23 PROCEDURE — 3078F DIAST BP <80 MM HG: CPT | Performed by: NURSE PRACTITIONER

## 2023-06-23 PROCEDURE — 99214 OFFICE O/P EST MOD 30 MIN: CPT | Performed by: NURSE PRACTITIONER

## 2023-06-23 RX ORDER — GABAPENTIN 100 MG/1
100 CAPSULE ORAL 3 TIMES DAILY
Qty: 60 CAPSULE | Refills: 0 | Status: SHIPPED | OUTPATIENT
Start: 2023-06-23 | End: 2023-06-23

## 2023-06-23 RX ORDER — VALACYCLOVIR HYDROCHLORIDE 1 G/1
1000 TABLET, FILM COATED ORAL 3 TIMES DAILY
Qty: 21 TABLET | Refills: 0 | Status: SHIPPED | OUTPATIENT
Start: 2023-06-23 | End: 2023-06-30

## 2023-06-23 RX ORDER — GABAPENTIN 100 MG/1
100 CAPSULE ORAL 3 TIMES DAILY PRN
Qty: 60 CAPSULE | Refills: 0 | Status: SHIPPED | OUTPATIENT
Start: 2023-06-23 | End: 2023-07-06

## 2023-06-23 ASSESSMENT — ENCOUNTER SYMPTOMS
PALPITATIONS: 0
COUGH: 0
EYES NEGATIVE: 1
MUSCULOSKELETAL NEGATIVE: 1
GASTROINTESTINAL NEGATIVE: 1
FEVER: 0
CONSTITUTIONAL NEGATIVE: 1
NEUROLOGICAL NEGATIVE: 1
SPUTUM PRODUCTION: 0
SHORTNESS OF BREATH: 0

## 2023-06-23 ASSESSMENT — FIBROSIS 4 INDEX: FIB4 SCORE: 0.85

## 2023-06-24 NOTE — PROGRESS NOTES
Subjective       CC:   Chief Complaint   Patient presents with    Rash     All across his back and hips. Going on for about 2 weeks. Gets painful at times. Not taken any meds        HPI:   Patient is a 52 y.o. established male patient with medical history listed below here today with new concern of burning sensation on his skin the past 2 weeks. States sensation goes from his back across both sides of his torso. It is worse at night or when his clothes rub against his skin. No visible rash. He had chicken pox twice as a child.     Patient Active Problem List   Diagnosis    Allergic rhinitis    Allergic asthma    Eosinophilic esophagitis    GERD (gastroesophageal reflux disease)    Hx pulmonary embolism    Essential hypertension    Elevated LFTs    HLD (hyperlipidemia)    Skin lesion of face    Pulmonary embolism (HCC)    Sleep trouble    Chronic pain of right knee    History of palpitations    Herpes zoster without complication       Past Medical History:   Diagnosis Date    Allergic rhinitis     Anesthesia     doesn't do well with novacaine/lidocaine, ponv    Arthritis     Asthma     Asthma     Cough     Eosinophilic esophagitis 2011    Per GI Biopsy    GERD (gastroesophageal reflux disease)     History of pulmonary embolus (PE) 01/2021    Indigestion     Shortness of breath     Sputum production     Wheezing         Past Surgical History:   Procedure Laterality Date    INGUINAL HERNIA LAPAROSCOPIC  8/29/2012    Performed by ROLAND POLLOCK at SUR[facility]    INGUINAL HERNIA REPAIR  11/30/2011    Performed by ROLAND POLLOCK at [facility]    EGD WITH ASP/BX  10/11    Dr Brice.  BX negative for Piper's        Current Outpatient Medications on File Prior to Visit   Medication Sig Dispense Refill    TRELEGY ELLIPTA 200-62.5-25 MCG/ACT AEROSOL POWDER, BREATH ACTIVATED       omeprazole (PRILOSEC) 40 MG delayed-release capsule       losartan-hydrochlorothiazide (HYZAAR) 50-12.5 MG per tablet  "Take 1 Tablet by mouth every day. 90 Tablet 3    levalbuterol (XOPENEX HFA) 45 MCG/ACT inhaler Inhale 2 Puffs every four hours as needed for Shortness of Breath. 1 Each 3    ELIQUIS 2.5 MG Tab TAKE 1 TABLET BY MOUTH TWICE DAILY 60 Tablet 2    cetirizine (ZYRTEC) 10 MG TABS Take 1 Tablet by mouth every day.      fluticasone-salmeterol (ADVAIR DISKUS) 500-50 MCG/ACT AEROSOL POWDER, BREATH ACTIVATED Inhale 1 Puff 2 times a day. Rinse mouth after each use. (Patient not taking: Reported on 6/23/2023) 1 Each 3     No current facility-administered medications on file prior to visit.        ROS:  Review of Systems   Constitutional: Negative.  Negative for fever and malaise/fatigue.   HENT: Negative.     Eyes: Negative.    Respiratory:  Negative for cough, sputum production and shortness of breath.    Cardiovascular:  Negative for chest pain, palpitations and leg swelling.   Gastrointestinal: Negative.    Genitourinary: Negative.    Musculoskeletal: Negative.    Skin:         Burning sensation on skin   Neurological: Negative.    Endo/Heme/Allergies: Negative.        Objective       Exam:  /74 (BP Location: Right arm, Patient Position: Sitting, BP Cuff Size: Adult)   Pulse 93   Temp 36.8 °C (98.2 °F) (Temporal)   Ht 1.803 m (5' 11\")   Wt 97.1 kg (214 lb)   SpO2 100%   BMI 29.85 kg/m²  Body mass index is 29.85 kg/m².    Physical Exam  Skin:            Comments: Burning sensation; skin sensitive to touch         Assessment & Plan       52 y.o. male with the following -     Problem List Items Addressed This Visit       Herpes zoster without complication     Burning sensation across both sides of his torso x 2 weeks; no visible rash today. Area is sensitive to touch. Given his history of chicken pox twice in childhood and skin sensitivity in dermatone pattern, high suspicion for shingles flare. He has not been vaccinated against shingles.   - We will start treatment with valtrex 1G TID for 7days, adding gabapentin " 100mg TID prn pain.   - He understands to wait till symptoms have completely cleared before getting vaccine.            Relevant Medications    valacyclovir (VALTREX) 1 GM Tab    gabapentin (NEURONTIN) 100 MG Cap       Medications Prescribed Today:  1. Herpes zoster without complication  - valacyclovir (VALTREX) 1 GM Tab; Take 1 Tablet by mouth 3 times a day for 7 days.  Dispense: 21 Tablet; Refill: 0  - gabapentin (NEURONTIN) 100 MG Cap; Take 1 Capsule by mouth 3 times a day as needed (pain).  Dispense: 60 Capsule; Refill: 0    Educated in proper administration of medication(s) ordered today including safety, possible SE, risks, benefits, rationale and alternatives to therapy.     Return if symptoms worsen or fail to improve.    Please note that this dictation was created using voice recognition software. I have made every reasonable attempt to correct obvious errors, but I expect that there are errors of grammar and possibly content that I did not discover before finalizing the note.

## 2023-07-05 ENCOUNTER — PATIENT MESSAGE (OUTPATIENT)
Dept: MEDICAL GROUP | Facility: PHYSICIAN GROUP | Age: 53
End: 2023-07-05
Payer: COMMERCIAL

## 2023-07-05 DIAGNOSIS — B02.29 POSTHERPETIC NEURALGIA: ICD-10-CM

## 2023-07-06 RX ORDER — GABAPENTIN 100 MG/1
200 CAPSULE ORAL 3 TIMES DAILY
Qty: 180 CAPSULE | Refills: 0 | Status: SHIPPED
Start: 2023-07-06 | End: 2024-03-01

## 2023-07-06 RX ORDER — METHYLPREDNISOLONE 4 MG/1
TABLET ORAL
Qty: 21 TABLET | Refills: 0 | Status: SHIPPED
Start: 2023-07-06 | End: 2024-02-22

## 2023-07-07 NOTE — PROGRESS NOTES
1. Postherpetic neuralgia  Pain worse when he finished gabapentin, valcyclovir. Will increase dose and add steroid  - gabapentin (NEURONTIN) 100 MG Cap; Take 2 Capsules by mouth 3 times a day.  Dispense: 180 Capsule; Refill: 0  - methylPREDNISolone (MEDROL DOSEPAK) 4 MG Tablet Therapy Pack; As directed on the packaging label.  Dispense: 21 Tablet; Refill: 0

## 2023-07-27 ENCOUNTER — APPOINTMENT (OUTPATIENT)
Dept: MEDICAL GROUP | Facility: PHYSICIAN GROUP | Age: 53
End: 2023-07-27
Payer: COMMERCIAL

## 2023-07-28 ENCOUNTER — APPOINTMENT (OUTPATIENT)
Dept: RADIOLOGY | Facility: MEDICAL CENTER | Age: 53
End: 2023-07-28
Attending: EMERGENCY MEDICINE
Payer: COMMERCIAL

## 2023-07-28 ENCOUNTER — HOSPITAL ENCOUNTER (EMERGENCY)
Facility: MEDICAL CENTER | Age: 53
End: 2023-07-28
Attending: EMERGENCY MEDICINE
Payer: COMMERCIAL

## 2023-07-28 ENCOUNTER — OFFICE VISIT (OUTPATIENT)
Dept: URGENT CARE | Facility: PHYSICIAN GROUP | Age: 53
End: 2023-07-28
Payer: COMMERCIAL

## 2023-07-28 VITALS
BODY MASS INDEX: 30.06 KG/M2 | WEIGHT: 214.73 LBS | SYSTOLIC BLOOD PRESSURE: 135 MMHG | RESPIRATION RATE: 15 BRPM | HEART RATE: 78 BPM | DIASTOLIC BLOOD PRESSURE: 92 MMHG | OXYGEN SATURATION: 94 % | TEMPERATURE: 98.1 F | HEIGHT: 71 IN

## 2023-07-28 VITALS
RESPIRATION RATE: 18 BRPM | BODY MASS INDEX: 29.4 KG/M2 | TEMPERATURE: 98.8 F | HEIGHT: 71 IN | DIASTOLIC BLOOD PRESSURE: 90 MMHG | HEART RATE: 92 BPM | OXYGEN SATURATION: 96 % | SYSTOLIC BLOOD PRESSURE: 138 MMHG | WEIGHT: 210 LBS

## 2023-07-28 DIAGNOSIS — M54.31 SCIATICA OF RIGHT SIDE: ICD-10-CM

## 2023-07-28 DIAGNOSIS — M54.16 LUMBAR RADICULOPATHY: ICD-10-CM

## 2023-07-28 DIAGNOSIS — M54.31 SCIATIC NERVE PAIN, RIGHT: ICD-10-CM

## 2023-07-28 LAB
ALBUMIN SERPL BCP-MCNC: 4.4 G/DL (ref 3.2–4.9)
ALBUMIN/GLOB SERPL: 1.6 G/DL
ALP SERPL-CCNC: 58 U/L (ref 30–99)
ALT SERPL-CCNC: 23 U/L (ref 2–50)
ANION GAP SERPL CALC-SCNC: 13 MMOL/L (ref 7–16)
APPEARANCE UR: CLEAR
APTT PPP: 23.9 SEC (ref 24.7–36)
AST SERPL-CCNC: 14 U/L (ref 12–45)
BASOPHILS # BLD AUTO: 0.1 % (ref 0–1.8)
BASOPHILS # BLD: 0.01 K/UL (ref 0–0.12)
BILIRUB SERPL-MCNC: 0.8 MG/DL (ref 0.1–1.5)
BILIRUB UR QL STRIP.AUTO: NEGATIVE
BUN SERPL-MCNC: 17 MG/DL (ref 8–22)
CALCIUM ALBUM COR SERPL-MCNC: 9.2 MG/DL (ref 8.5–10.5)
CALCIUM SERPL-MCNC: 9.5 MG/DL (ref 8.4–10.2)
CHLORIDE SERPL-SCNC: 105 MMOL/L (ref 96–112)
CO2 SERPL-SCNC: 22 MMOL/L (ref 20–33)
COLOR UR: YELLOW
CREAT SERPL-MCNC: 1.02 MG/DL (ref 0.5–1.4)
EOSINOPHIL # BLD AUTO: 0.04 K/UL (ref 0–0.51)
EOSINOPHIL NFR BLD: 0.5 % (ref 0–6.9)
ERYTHROCYTE [DISTWIDTH] IN BLOOD BY AUTOMATED COUNT: 41.6 FL (ref 35.9–50)
GFR SERPLBLD CREATININE-BSD FMLA CKD-EPI: 89 ML/MIN/1.73 M 2
GLOBULIN SER CALC-MCNC: 2.7 G/DL (ref 1.9–3.5)
GLUCOSE SERPL-MCNC: 98 MG/DL (ref 65–99)
GLUCOSE UR STRIP.AUTO-MCNC: NEGATIVE MG/DL
HCT VFR BLD AUTO: 46.1 % (ref 42–52)
HGB BLD-MCNC: 16.1 G/DL (ref 14–18)
IMM GRANULOCYTES # BLD AUTO: 0.05 K/UL (ref 0–0.11)
IMM GRANULOCYTES NFR BLD AUTO: 0.6 % (ref 0–0.9)
INR PPP: 0.96 (ref 0.87–1.13)
KETONES UR STRIP.AUTO-MCNC: NEGATIVE MG/DL
LEUKOCYTE ESTERASE UR QL STRIP.AUTO: NEGATIVE
LYMPHOCYTES # BLD AUTO: 0.85 K/UL (ref 1–4.8)
LYMPHOCYTES NFR BLD: 9.7 % (ref 22–41)
MCH RBC QN AUTO: 31.8 PG (ref 27–33)
MCHC RBC AUTO-ENTMCNC: 34.9 G/DL (ref 32.3–36.5)
MCV RBC AUTO: 91.1 FL (ref 81.4–97.8)
MICRO URNS: NORMAL
MONOCYTES # BLD AUTO: 0.21 K/UL (ref 0–0.85)
MONOCYTES NFR BLD AUTO: 2.4 % (ref 0–13.4)
NEUTROPHILS # BLD AUTO: 7.63 K/UL (ref 1.82–7.42)
NEUTROPHILS NFR BLD: 86.7 % (ref 44–72)
NITRITE UR QL STRIP.AUTO: NEGATIVE
NRBC # BLD AUTO: 0 K/UL
NRBC BLD-RTO: 0 /100 WBC (ref 0–0.2)
PH UR STRIP.AUTO: 5.5 [PH] (ref 5–8)
PLATELET # BLD AUTO: 180 K/UL (ref 164–446)
PMV BLD AUTO: 9.8 FL (ref 9–12.9)
POTASSIUM SERPL-SCNC: 4.1 MMOL/L (ref 3.6–5.5)
PROT SERPL-MCNC: 7.1 G/DL (ref 6–8.2)
PROT UR QL STRIP: NEGATIVE MG/DL
PROTHROMBIN TIME: 13.2 SEC (ref 12–14.6)
RBC # BLD AUTO: 5.06 M/UL (ref 4.7–6.1)
RBC UR QL AUTO: NEGATIVE
SODIUM SERPL-SCNC: 140 MMOL/L (ref 135–145)
SP GR UR STRIP.AUTO: 1.01
WBC # BLD AUTO: 8.8 K/UL (ref 4.8–10.8)

## 2023-07-28 PROCEDURE — 93971 EXTREMITY STUDY: CPT | Mod: 26,RT | Performed by: INTERNAL MEDICINE

## 2023-07-28 PROCEDURE — 74177 CT ABD & PELVIS W/CONTRAST: CPT

## 2023-07-28 PROCEDURE — 700111 HCHG RX REV CODE 636 W/ 250 OVERRIDE (IP): Mod: JZ | Performed by: EMERGENCY MEDICINE

## 2023-07-28 PROCEDURE — 3080F DIAST BP >= 90 MM HG: CPT

## 2023-07-28 PROCEDURE — 99284 EMERGENCY DEPT VISIT MOD MDM: CPT

## 2023-07-28 PROCEDURE — 85610 PROTHROMBIN TIME: CPT

## 2023-07-28 PROCEDURE — 99213 OFFICE O/P EST LOW 20 MIN: CPT

## 2023-07-28 PROCEDURE — 3075F SYST BP GE 130 - 139MM HG: CPT

## 2023-07-28 PROCEDURE — 36415 COLL VENOUS BLD VENIPUNCTURE: CPT

## 2023-07-28 PROCEDURE — 81003 URINALYSIS AUTO W/O SCOPE: CPT

## 2023-07-28 PROCEDURE — 85730 THROMBOPLASTIN TIME PARTIAL: CPT

## 2023-07-28 PROCEDURE — 93971 EXTREMITY STUDY: CPT | Mod: RT

## 2023-07-28 PROCEDURE — 85025 COMPLETE CBC W/AUTO DIFF WBC: CPT

## 2023-07-28 PROCEDURE — 700117 HCHG RX CONTRAST REV CODE 255: Performed by: EMERGENCY MEDICINE

## 2023-07-28 PROCEDURE — 80053 COMPREHEN METABOLIC PANEL: CPT

## 2023-07-28 PROCEDURE — 96374 THER/PROPH/DIAG INJ IV PUSH: CPT

## 2023-07-28 RX ORDER — HYDROMORPHONE HYDROCHLORIDE 1 MG/ML
0.5 INJECTION, SOLUTION INTRAMUSCULAR; INTRAVENOUS; SUBCUTANEOUS ONCE
Status: COMPLETED | OUTPATIENT
Start: 2023-07-28 | End: 2023-07-28

## 2023-07-28 RX ORDER — OXYCODONE HYDROCHLORIDE AND ACETAMINOPHEN 5; 325 MG/1; MG/1
1 TABLET ORAL EVERY 6 HOURS PRN
Qty: 16 TABLET | Refills: 0 | Status: SHIPPED | OUTPATIENT
Start: 2023-07-28 | End: 2023-08-01

## 2023-07-28 RX ORDER — KETOROLAC TROMETHAMINE 30 MG/ML
30 INJECTION, SOLUTION INTRAMUSCULAR; INTRAVENOUS ONCE
Status: CANCELLED | OUTPATIENT
Start: 2023-07-28 | End: 2023-07-28

## 2023-07-28 RX ADMIN — HYDROMORPHONE HYDROCHLORIDE 0.5 MG: 1 INJECTION, SOLUTION INTRAMUSCULAR; INTRAVENOUS; SUBCUTANEOUS at 11:34

## 2023-07-28 RX ADMIN — IOHEXOL 100 ML: 350 INJECTION, SOLUTION INTRAVENOUS at 11:58

## 2023-07-28 ASSESSMENT — FIBROSIS 4 INDEX
FIB4 SCORE: 0.85
FIB4 SCORE: 0.85

## 2023-07-28 ASSESSMENT — PAIN DESCRIPTION - PAIN TYPE: TYPE: ACUTE PAIN

## 2023-07-28 NOTE — DISCHARGE INSTRUCTIONS
Your test today showed no dangerous cause for your symptoms, no findings of blood clots or bleeding.  This is likely coming from your back and inflammation to your sciatic nerve.  Please continue to take the steroid, take the new pain medication to help with your symptoms and follow-up at your appointment on Tuesday.  Seek more immediate medical attention for any new weakness or numbness, episodes of incontinence, fevers uncontrolled pain or other concerns

## 2023-07-28 NOTE — ED PROVIDER NOTES
ED Provider Note    CHIEF COMPLAINT  Chief Complaint   Patient presents with    Leg Pain       EXTERNAL RECORDS REVIEWED  Outpatient Notes from [facility] on  for back pain, started on Medrol Dosepak, went to urgent care today with continued pain    HPI/ROS  LIMITATION TO HISTORY   Select: : None  OUTSIDE HISTORIAN(S):  none    Prakash Kaur is a 52 y.o. male who presents with leg and back pain.  Patient reports that his symptoms initially started on Tuesday, with some pain to the outside of his leg.  He states he had been lifting some heavy equipment but no known injury to that area.  The pain seems to be sharp, with a tingling sensation as well as some low back pain that was not too severe.  The pain continued to increase, he then went to [facility], was diagnosed with sciatica, had x-rays that showed degenerative disease.  The pain has continued and continued to worsen prompting his visit today.  He reports the pain to his leg is the primary pain, and there is no numbness or weakness.  No bowel or bladder incontinence or retention.  No fevers or chills.  No abdominal pain.  No other focal areas of pain.    He does take blood thinners for history of pulmonary embolism    PAST MEDICAL HISTORY   has a past medical history of Allergic rhinitis, Anesthesia, Arthritis, Asthma, Asthma, Cough, Eosinophilic esophagitis (), GERD (gastroesophageal reflux disease), History of pulmonary embolus (PE) (2021), Indigestion, Shortness of breath, Sputum production, and Wheezing.    SURGICAL HISTORY   has a past surgical history that includes egd with asp/bx (10/11); inguinal hernia repair (2011); and inguinal hernia laparoscopic (2012).    FAMILY HISTORY  Family History   Problem Relation Age of Onset    Heart Disease Mother     Hypertension Father     Stroke Paternal Uncle     Heart Disease Maternal Grandfather          of heart attack at 55       SOCIAL HISTORY  Social  "History     Tobacco Use    Smoking status: Former     Types: Cigarettes     Quit date: 2007     Years since quittin.5     Passive exposure: Never    Smokeless tobacco: Former     Types: Chew     Quit date: 2007   Vaping Use    Vaping Use: Never used   Substance and Sexual Activity    Alcohol use: Yes     Alcohol/week: 1.8 oz     Types: 1 Glasses of wine, 1 Cans of beer, 1 Shots of liquor per week     Comment: 3 days a week maybe    Drug use: No    Sexual activity: Yes     Partners: Female       CURRENT MEDICATIONS  Home Medications    **Home medications have not yet been reviewed for this encounter**         ALLERGIES  Allergies   Allergen Reactions    Amlodipine      Leg swelling and headaches.        PHYSICAL EXAM  VITAL SIGNS: BP (!) 135/92   Pulse 78   Temp 36.7 °C (98.1 °F) (Temporal)   Resp 15   Ht 1.803 m (5' 11\")   Wt 97.4 kg (214 lb 11.7 oz)   SpO2 94%   BMI 29.95 kg/m²      Pulse ox interpretation: I interpret this pulse ox as normal.  Constitutional: Alert uncomfortable  HENT: No signs of trauma, Bilateral external ears normal, Nose normal.   Eyes: Pupils are equal and reactive, Conjunctiva normal, Non-icteric.   Neck: Normal range of motion, No tenderness, Supple, No stridor.   Cardiovascular: Regular rate and rhythm, no murmurs.   Thorax & Lungs: Normal breath sounds, No respiratory distress, No wheezing, No chest tenderness.   Abdomen: Bowel sounds normal, Soft, No tenderness, No masses, No pulsatile masses. No peritoneal signs.  Skin: Warm, Dry, No erythema, No rash to the leg or back  Back: No bony tenderness, No CVA tenderness.   Extremities: Intact distal pulses, No edema, tenderness noted in the popliteal fossa, calf as well as right lateral leg, No cyanosis,  Negative Eyad's sign.   Musculoskeletal: Good range of motion in all major joints. No tenderness to palpation or major deformities noted.   Neurologic: Alert , strength is 5 out of 5 with bilateral lower extremities, hip " flexion and extension, knee flexion extension, dorsiflexion and plantarflexion at the ankle, sensation is intact to light touch throughout normal motor function, Normal sensory function, No focal deficits noted.   Psychiatric: Affect normal, Judgment normal, Mood normal.               DIAGNOSTIC STUDIES / PROCEDURES  Labs Reviewed   CBC WITH DIFFERENTIAL - Abnormal; Notable for the following components:       Result Value    Neutrophils-Polys 86.70 (*)     Lymphocytes 9.70 (*)     Neutrophils (Absolute) 7.63 (*)     Lymphs (Absolute) 0.85 (*)     All other components within normal limits    Narrative:     Indicate which anticoagulants the patient is on:->UNKNOWN   APTT - Abnormal; Notable for the following components:    APTT 23.9 (*)     All other components within normal limits    Narrative:     Indicate which anticoagulants the patient is on:->UNKNOWN   COMP METABOLIC PANEL    Narrative:     Indicate which anticoagulants the patient is on:->UNKNOWN   PROTHROMBIN TIME    Narrative:     Indicate which anticoagulants the patient is on:->UNKNOWN   URINALYSIS   ESTIMATED GFR    Narrative:     Indicate which anticoagulants the patient is on:->UNKNOWN         RADIOLOGY  I have independently interpreted the diagnostic imaging associated with this visit and am waiting the final reading from the radiologist.   My preliminary interpretation is as follows: No retroperitoneal bleed  Radiologist interpretation:   CT-ABDOMEN-PELVIS WITH   Final Result         1. No acute abnormality in the abdomen or pelvis.   2. No retroperitoneal hemorrhage      US-EXTREMITY VENOUS LOWER UNILAT RIGHT   Final Result            COURSE & MEDICAL DECISION MAKING    ED Observation Status? Yes; I am placing the patient in to an observation status due to a diagnostic uncertainty as well as therapeutic intensity. Patient placed in observation status at 11:21 AM, 7/28/2023.     Observation plan is as follows: Pain management, diagnostic evaluation as  below    Upon Reevaluation, the patient's condition has: Improved; and will be discharged.    Patient discharged from ED Observation status at 12:31 PM   (Time) 07/28/23   (Date).     INITIAL ASSESSMENT, COURSE AND PLAN  Care Narrative: 11:14 AM  Patient is evaluated at bedside, at this point differential includes acute pathology such as DVT, sciatica, disc bulge, also consideration for retroperitoneal bleed as he is on a blood thinner, I also considered spinal compression syndrome although this seems highly unlikely as he has no focal deficits noted on exam and his symptoms are not consistent with this diagnosis, also consider potential infectious pathology such as discitis or potential abscess but again he has no symptoms highly suggestive of this diagnosis, no fevers or other infectious symptoms, no history of immunocompromise.  I have ordered for ultrasound, CT, diagnostic labs, hydromorphone for pain    12:31 PM  Patient is reevaluated, pain is greatly improved.  Updated on all results and he is comfortable and agreeable with discharge          ADDITIONAL PROBLEM LIST  #Low back pain with sciatica.  Patient recently started on Medrol Dosepak and will continue this, he has not had improvement with prior prescribed pain medications so we will prescribe a short course of Percocet he does have follow-up to see pain specialist on Tuesday.  There is no finding of neurologic compromise or spinal compression syndrome.  Given his leg pain as well as calf pain and history of PE I did obtain an ultrasound shows no evidence of DVT, CT with no evidence of fracture or bleed, or other acute pathology.  Interestingly he was recently diagnosed with zoster although he has no rash on exam or findings of this today to suggest this is cause for his symptoms    #Pulmonary embolism, history of on Eliquis      DISPOSITION AND DISCUSSIONS    Barriers to care at this time, including but not limited to:  none .     Decision tools and  prescription drugs considered including, but not limited to: Pain Medications Percocet as prescribed for pain .    I reviewed prescription monitoring program for patient's narcotic use before prescribing a scheduled drug.The patient will not drink alcohol nor drive with prescribed medications.The patient will return for new or worsening symptoms and is stable at the time of discharge.    The patient is referred to a primary physician for blood pressure management, diabetic screening, and for all other preventative health concerns.    In prescribing controlled substances to this patient, I certify that I have obtained and reviewed the medical history of Prakash Kaur. I have also made a good david effort to obtain applicable records from other providers who have treated the patient and records did not demonstrate any increased risk of substance abuse that would prevent me from prescribing controlled substances.     I have conducted a physical exam and documented it. I have reviewed Mr. Kaur’s prescription history as maintained by the [place] Prescription Monitoring Program.     I have assessed the patient’s risk for abuse, dependency, and addiction using the validated Opioid Risk Tool available at https://www.mdcalc.com/tgyrbo-eypn-zsjc-ort-narcotic-abuse.     Given the above, I believe the benefits of controlled substance therapy outweigh the risks. The reasons for prescribing controlled substances include non-narcotic, oral analgesic alternatives have been inadequate for pain control. Accordingly, I have discussed the risk and benefits, treatment plan, and alternative therapies with the patient.       DISPOSITION:  Patient will be discharged home in stable condition.    FOLLOW UP:  At your pain management appointment on Tuesday            OUTPATIENT MEDICATIONS:  New Prescriptions    OXYCODONE-ACETAMINOPHEN (PERCOCET) 5-325 MG TAB    Take 1 Tablet by mouth every 6 hours as needed for Moderate Pain for up to 4  days.         FINAL DIAGNOSIS  1. Sciatica of right side           Electronically signed by: Fermin Moody M.D., 7/28/2023 11:01 AM

## 2023-07-28 NOTE — ED TRIAGE NOTES
"Pt ambulates into ER with chief complaint of right leg pain that radiates to hip, \"x 2 days.\"  Pt reports that he was, lifting heavy equipment in his trailer Tuesday and then noticed pain while he was golfing later that day.\"  Pt went to [facility] yesterday and was diagnosed with sciatic nerve pain.  Pt reports pain to be, \"intolerable.\"  Pedal pulses are present in both feet.  Pt denies any other acute pain.   "

## 2023-07-28 NOTE — PROGRESS NOTES
Subjective     Prakash Kaur is a 52 y.o. male who presents with Back Pain (Low back pain ,right side has dx but cant get into pain doctor until Tuesday. )            HPI patient stated he was seen at [facility] on 7/26/2023.  At that time he had lumbar x-rays and was diagnosed with pinched sciatic nerve.  Patient states he was started on a Medrol Dosepak, and Flexeril.  He was referred to [facility], and has an appointment next Tuesday.  He presents today because he states he is just getting worse and cannot stand the pain.  Pain is right-sided, right lower back posterior thigh.  He states he has some radiation down into the calf intermittently.  He complains of intermittent numbness and tingling.  He states the only time he is comfortable is if he is flat on a hard floor, with his right leg is slightly elevated on pillows.  He states this is how he slept last night.  He denies any numbness or tingling to the inner thighs, he denies any loss of bowel or bladder control.    He states this all started about a week and a half ago with a feeling of tightness to the area.  This week he went golfing on Tuesday, Wednesday and was done he states he could barely walk.  He was doing the rock next day.        ROS Same as above      Allergies   Allergen Reactions    Amlodipine      Leg swelling and headaches.        Current Outpatient Medications   Medication Sig    methylPREDNISolone (MEDROL DOSEPAK) 4 MG Tablet Therapy Pack Follow schedule on package instructions.    cyclobenzaprine (FLEXERIL) 10 mg Tab Take 1 Tablet by mouth 3 times a day as needed for Moderate Pain or Muscle Spasms.    TRELEGY ELLIPTA 200-62.5-25 MCG/ACT AEROSOL POWDER, BREATH ACTIVATED     omeprazole (PRILOSEC) 40 MG delayed-release capsule     losartan-hydrochlorothiazide (HYZAAR) 50-12.5 MG per tablet Take 1 Tablet by mouth every day.    levalbuterol (XOPENEX HFA) 45 MCG/ACT inhaler Inhale 2 Puffs every four hours as needed for Shortness of  "Breath.    ELIQUIS 2.5 MG Tab TAKE 1 TABLET BY MOUTH TWICE DAILY    cetirizine (ZYRTEC) 10 MG TABS Take 1 Tablet by mouth every day.    gabapentin (NEURONTIN) 100 MG Cap Take 2 Capsules by mouth 3 times a day. (Patient not taking: Reported on 7/28/2023)    methylPREDNISolone (MEDROL DOSEPAK) 4 MG Tablet Therapy Pack As directed on the packaging label. (Patient not taking: Reported on 7/28/2023)    fluticasone-salmeterol (ADVAIR DISKUS) 500-50 MCG/ACT AEROSOL POWDER, BREATH ACTIVATED Inhale 1 Puff 2 times a day. Rinse mouth after each use. (Patient not taking: Reported on 6/23/2023)        Past Medical History:   Diagnosis Date    Allergic rhinitis     Anesthesia     doesn't do well with novacaine/lidocaine, ponv    Arthritis     Asthma     Asthma     Cough     Eosinophilic esophagitis 2011    Per GI Biopsy    GERD (gastroesophageal reflux disease)     History of pulmonary embolus (PE) 01/2021    Indigestion     Shortness of breath     Sputum production     Wheezing           Objective     BP (!) 138/90   Pulse 92   Temp 37.1 °C (98.8 °F) (Temporal)   Resp 18   Ht 1.803 m (5' 11\")   Wt 95.3 kg (210 lb)   SpO2 96%   BMI 29.29 kg/m²      Physical Exam  Vitals reviewed.   Constitutional:       General: He is not in acute distress.     Appearance: Normal appearance.      Comments: In moderate distress, unable to remain seated, unable to lay down due to pain.   HENT:      Head: Normocephalic and atraumatic.      Right Ear: External ear normal.      Left Ear: External ear normal.      Nose: Nose normal.      Mouth/Throat:      Mouth: Mucous membranes are moist.   Eyes:      Conjunctiva/sclera: Conjunctivae normal.   Cardiovascular:      Rate and Rhythm: Normal rate.   Pulmonary:      Effort: Pulmonary effort is normal. No respiratory distress.   Musculoskeletal:      Cervical back: Normal range of motion.      Lumbar back: Spasms present. No swelling, deformity, tenderness or bony tenderness. Decreased range of " motion.      Comments: He has no bony tenderness with palpation to the lumbar spine, there is no edema, no erythema no deformity, no bony step-offs.  There is decreased range of motion due to pain.  There is spasms to the right lower back with palpation.  Patient unable to lie flat in clinic to perform SLR.   Skin:     General: Skin is warm and dry.      Capillary Refill: Capillary refill takes less than 2 seconds.   Neurological:      Mental Status: He is alert and oriented to person, place, and time.      Motor: Motor function is intact.      Gait: Gait abnormal.      Comments: Antalgic gait   Psychiatric:         Attention and Perception: Attention normal.                Assessment & Plan      Patient presents today with his wife due to recent diagnosis, 2 days ago, of pinched right sciatic nerve.  He states at that time he was placed on a Medrol Dosepak and Flexeril.  He states he is getting worse, and cannot stand the pain.  He states his appointment with the pain and spinal specialist is not till next Tuesday and he does not feel capable of waiting.  Patient states driving to the [facility] for [facility] had to pull over 3 times in order for him to be able to stand up.  His pain is increased with sitting.      On exam he has decreased range of motion due to pain, there is spasm to the right lower back with palpation.  There is no bony tenderness to the lumbar spine with palpation, no bony step-offs, no crepitus.  Sensation is currently intact.  Patient is unable to lie flat in order to perform SLR.   BP elevated in clinic 138/90, patient in pain, and states he has not slept overnight.    Patient currently on Eliquis.  Discussed risks as patient is already on Medrol Dosepak.  No Toradol injection given today in clinic.  Discussed inability to manage patient current pain level in an outpatient setting at this time, concerns for increasing symptoms while on Medrol Dosepak.  Discussed options of return  to [facility] for further testing and imaging, versus presenting to the ED for further evaluation and symptom management.  Patient and spouse wish to return to [facility] today.  Differential diagnosis, natural history, supportive care, and indications for immediate follow-up were discussed.        1. Lumbar radiculopathy        2. Sciatic nerve pain, right

## 2023-07-28 NOTE — ED NOTES
Patient given instructions, v/u instructions and prescriptions.  Patient ambulated out of ER with steady gate w/o any acute changes or complaints.

## 2023-08-03 ENCOUNTER — HOSPITAL ENCOUNTER (OUTPATIENT)
Dept: HEMATOLOGY ONCOLOGY | Facility: MEDICAL CENTER | Age: 53
End: 2023-08-03
Attending: PAIN MEDICINE
Payer: COMMERCIAL

## 2023-08-03 ENCOUNTER — HOSPITAL ENCOUNTER (OUTPATIENT)
Dept: RADIOLOGY | Facility: MEDICAL CENTER | Age: 53
End: 2023-08-03
Attending: PAIN MEDICINE
Payer: COMMERCIAL

## 2023-08-03 VITALS
OXYGEN SATURATION: 95 % | TEMPERATURE: 98.4 F | HEART RATE: 98 BPM | DIASTOLIC BLOOD PRESSURE: 70 MMHG | HEIGHT: 71 IN | WEIGHT: 213 LBS | BODY MASS INDEX: 29.82 KG/M2 | SYSTOLIC BLOOD PRESSURE: 110 MMHG

## 2023-08-03 DIAGNOSIS — M54.17 LUMBOSACRAL NEURITIS: ICD-10-CM

## 2023-08-03 DIAGNOSIS — Z79.01 CURRENT USE OF LONG TERM ANTICOAGULATION: ICD-10-CM

## 2023-08-03 DIAGNOSIS — Z86.711 HX PULMONARY EMBOLISM: ICD-10-CM

## 2023-08-03 PROCEDURE — 99212 OFFICE O/P EST SF 10 MIN: CPT | Performed by: NURSE PRACTITIONER

## 2023-08-03 PROCEDURE — 99212 OFFICE O/P EST SF 10 MIN: CPT

## 2023-08-03 PROCEDURE — 72148 MRI LUMBAR SPINE W/O DYE: CPT

## 2023-08-03 PROCEDURE — 99214 OFFICE O/P EST MOD 30 MIN: CPT | Performed by: NURSE PRACTITIONER

## 2023-08-03 RX ORDER — OXYCODONE AND ACETAMINOPHEN 7.5; 325 MG/1; MG/1
1 TABLET ORAL EVERY 6 HOURS PRN
COMMUNITY
Start: 2023-08-01

## 2023-08-03 RX ORDER — GABAPENTIN 300 MG/1
CAPSULE ORAL
COMMUNITY
Start: 2023-08-01

## 2023-08-03 ASSESSMENT — ENCOUNTER SYMPTOMS
BLOOD IN STOOL: 0
COUGH: 0
CONSTIPATION: 0
WHEEZING: 0
CHILLS: 0
TINGLING: 1
INSOMNIA: 0
PALPITATIONS: 0
DIZZINESS: 0
NAUSEA: 0
VOMITING: 0
WEIGHT LOSS: 1
HEADACHES: 0
MYALGIAS: 1
FEVER: 0
SHORTNESS OF BREATH: 0
DIARRHEA: 0

## 2023-08-03 ASSESSMENT — FIBROSIS 4 INDEX: FIB4 SCORE: 0.81

## 2023-08-03 NOTE — PROGRESS NOTES
Subjective     Prakash Kaur is a 52 y.o. male who presents with Follow-Up (Horacio/workers comp / 6mo fv labs prior)            HPI  Mr. Kaur presents for evaluation of pulmonary embolism and continued anticoagulation with Eliquis. He is accompanied by his wife for today's visit.     Patient referred 2/2021 per PCP for further evaluation of pulmonary embolism, dx 1/4/21 (unprovoked vs r/t COVID vaccination (Moderna) on 12/30/20). BLE US competed 1/4/21 was negative for DVT and he was initiated on Eliquis 5 mg BID. Patient was negative for COVID, ECHO showed mild dilatation of right ventricle, LVEF was 55%, no pulmonary hypertension, spleen borderline enlarged, mildly elevated liver enzymes, negative hepatitis screening. Hematology work-up was facilitated by Dr. Cooper and included: negative HIT PF4 antibody IgG; negative anticardiolipin and beta-2 glycoproteins; normal iron studies and ferritin; negative OSMANY; CALR exon 9 & MPL codon 515 mutations not detected. Ultrasound completed 4/6/2021 shows mildly echogenic liver, consistent with fatty liver, borderline splenomegaly. Full thrombophilia work-up remains limited as patient continues with anticoagulation. He received 2nd Moderna vaccine dose without issue.     Patient was referred for second opinion, completed at [facility], Dr. Semaj Ayala, 7/19/2021. Patient was advised to continue Eliquis until summer 2022, be reevaluated at that time for risk-benefit of continuing anticoagulation vs consider reducing dose to 2.5 mg twice daily. Patient states that he desired to continue with care locally, he will reserve f/v w/ [facility] to PRN. It was determined he would continue on 2.5 mg BID, indefinitely.     Patient has retired from police work. He has lost 9 pounds since his February visit, purposefully. He is dealing with right sciatica but is otherwise doing well and at his baseline.       Review of Systems    Constitutional:  Positive for weight loss (9# purposeful since Feb). Negative for chills, fever and malaise/fatigue (retired now).   Respiratory:  Negative for cough, shortness of breath and wheezing.    Cardiovascular:  Negative for chest pain, palpitations and leg swelling.   Gastrointestinal:  Negative for blood in stool, constipation (Last BM yesterday), diarrhea, melena, nausea and vomiting.   Genitourinary:  Negative for dysuria and hematuria.   Musculoskeletal:  Positive for myalgias (R sciatic pain, workup per ortho). Negative for joint pain.   Neurological:  Positive for tingling (RLE r/t sciatica). Negative for dizziness and headaches.   Psychiatric/Behavioral:  The patient does not have insomnia.        Allergies   Allergen Reactions    Amlodipine      Leg swelling and headaches.          Current Outpatient Medications on File Prior to Encounter   Medication Sig Dispense Refill    gabapentin (NEURONTIN) 300 MG Cap TAKE 1 CAPSULE BY MOUTH THREE TIMES DAILY. START WITH 1 CAPSULE AT NIGHT AND. INCREASE AS TOLERATED UP TO 3 TIMES DAILY      oxyCODONE-acetaminophen (PERCOCET) 7.5-325 MG per tablet Take 1 Tablet by mouth every 6 hours as needed.      cyclobenzaprine (FLEXERIL) 10 mg Tab Take 1 Tablet by mouth 3 times a day as needed for Moderate Pain or Muscle Spasms. 30 Tablet 0    TRELEGY ELLIPTA 200-62.5-25 MCG/ACT AEROSOL POWDER, BREATH ACTIVATED       omeprazole (PRILOSEC) 40 MG delayed-release capsule       losartan-hydrochlorothiazide (HYZAAR) 50-12.5 MG per tablet Take 1 Tablet by mouth every day. 90 Tablet 3    levalbuterol (XOPENEX HFA) 45 MCG/ACT inhaler Inhale 2 Puffs every four hours as needed for Shortness of Breath. 1 Each 3    ELIQUIS 2.5 MG Tab TAKE 1 TABLET BY MOUTH TWICE DAILY 60 Tablet 2    cetirizine (ZYRTEC) 10 MG TABS Take 1 Tablet by mouth every day.      methylPREDNISolone (MEDROL DOSEPAK) 4 MG Tablet Therapy Pack Follow schedule on package instructions. (Patient not taking: Reported  "on 8/3/2023) 21 Tablet 0    gabapentin (NEURONTIN) 100 MG Cap Take 2 Capsules by mouth 3 times a day. (Patient not taking: Reported on 7/28/2023) 180 Capsule 0    methylPREDNISolone (MEDROL DOSEPAK) 4 MG Tablet Therapy Pack As directed on the packaging label. (Patient not taking: Reported on 7/28/2023) 21 Tablet 0    fluticasone-salmeterol (ADVAIR DISKUS) 500-50 MCG/ACT AEROSOL POWDER, BREATH ACTIVATED Inhale 1 Puff 2 times a day. Rinse mouth after each use. (Patient not taking: Reported on 6/23/2023) 1 Each 3     No current facility-administered medications on file prior to encounter.              Objective     /70 (BP Location: Left arm, Patient Position: Sitting, BP Cuff Size: Adult)   Pulse 98   Temp 36.9 °C (98.4 °F) (Temporal)   Ht 1.803 m (5' 11\")   Wt 96.6 kg (213 lb)   SpO2 95%   BMI 29.71 kg/m²      Physical Exam  Vitals reviewed.   Constitutional:       General: He is not in acute distress.     Appearance: He is well-developed. He is not diaphoretic.   HENT:      Head: Normocephalic and atraumatic.   Eyes:      General: No scleral icterus.        Right eye: No discharge.         Left eye: No discharge.   Cardiovascular:      Rate and Rhythm: Normal rate and regular rhythm.      Heart sounds: Normal heart sounds. No murmur heard.     No friction rub. No gallop.   Pulmonary:      Effort: Pulmonary effort is normal. No respiratory distress.      Breath sounds: Normal breath sounds. No wheezing.   Abdominal:      General: There is no distension.      Palpations: Abdomen is soft.      Tenderness: There is no abdominal tenderness.   Musculoskeletal:         General: Tenderness (right sciatic pain, cane for stability; s/p internal shingles, alternate sides at back - already on gabapentin, resolving discomfort) present. Normal range of motion.      Cervical back: Normal range of motion.   Skin:     General: Skin is warm and dry.      Coloration: Skin is not pale.      Findings: No erythema or rash. "   Neurological:      Mental Status: He is alert and oriented to person, place, and time.   Psychiatric:         Behavior: Behavior normal.       No visits with results within 1 Day(s) from this visit.   Latest known visit with results is:   Admission on 07/28/2023, Discharged on 07/28/2023   Component Date Value Ref Range Status    WBC 07/28/2023 8.8  4.8 - 10.8 K/uL Final    RBC 07/28/2023 5.06  4.70 - 6.10 M/uL Final    Hemoglobin 07/28/2023 16.1  14.0 - 18.0 g/dL Final    Hematocrit 07/28/2023 46.1  42.0 - 52.0 % Final    MCV 07/28/2023 91.1  81.4 - 97.8 fL Final    MCH 07/28/2023 31.8  27.0 - 33.0 pg Final    MCHC 07/28/2023 34.9  32.3 - 36.5 g/dL Final    Please note new reference range effective 05/22/2023.    RDW 07/28/2023 41.6  35.9 - 50.0 fL Final    Platelet Count 07/28/2023 180  164 - 446 K/uL Final    MPV 07/28/2023 9.8  9.0 - 12.9 fL Final    Neutrophils-Polys 07/28/2023 86.70 (H)  44.00 - 72.00 % Final    Lymphocytes 07/28/2023 9.70 (L)  22.00 - 41.00 % Final    Monocytes 07/28/2023 2.40  0.00 - 13.40 % Final    Eosinophils 07/28/2023 0.50  0.00 - 6.90 % Final    Basophils 07/28/2023 0.10  0.00 - 1.80 % Final    Immature Granulocytes 07/28/2023 0.60  0.00 - 0.90 % Final    Nucleated RBC 07/28/2023 0.00  0.00 - 0.20 /100 WBC Final    Please note new reference range effective 05/22/2023.    Neutrophils (Absolute) 07/28/2023 7.63 (H)  1.82 - 7.42 K/uL Final    Comment: Includes immature neutrophils, if present.  Please note new reference range effective 05/22/2023.      Lymphs (Absolute) 07/28/2023 0.85 (L)  1.00 - 4.80 K/uL Final    Monos (Absolute) 07/28/2023 0.21  0.00 - 0.85 K/uL Final    Eos (Absolute) 07/28/2023 0.04  0.00 - 0.51 K/uL Final    Baso (Absolute) 07/28/2023 0.01  0.00 - 0.12 K/uL Final    Immature Granulocytes (abs) 07/28/2023 0.05  0.00 - 0.11 K/uL Final    NRBC (Absolute) 07/28/2023 0.00  K/uL Final    Sodium 07/28/2023 140  135 - 145 mmol/L Final    Potassium 07/28/2023 4.1  3.6 -  5.5 mmol/L Final    Chloride 07/28/2023 105  96 - 112 mmol/L Final    Co2 07/28/2023 22  20 - 33 mmol/L Final    Anion Gap 07/28/2023 13.0  7.0 - 16.0 Final    Glucose 07/28/2023 98  65 - 99 mg/dL Final    Bun 07/28/2023 17  8 - 22 mg/dL Final    Creatinine 07/28/2023 1.02  0.50 - 1.40 mg/dL Final    Calcium 07/28/2023 9.5  8.4 - 10.2 mg/dL Final    Correct Calcium 07/28/2023 9.2  8.5 - 10.5 mg/dL Final    AST(SGOT) 07/28/2023 14  12 - 45 U/L Final    ALT(SGPT) 07/28/2023 23  2 - 50 U/L Final    Alkaline Phosphatase 07/28/2023 58  30 - 99 U/L Final    Total Bilirubin 07/28/2023 0.8  0.1 - 1.5 mg/dL Final    Albumin 07/28/2023 4.4  3.2 - 4.9 g/dL Final    Total Protein 07/28/2023 7.1  6.0 - 8.2 g/dL Final    Globulin 07/28/2023 2.7  1.9 - 3.5 g/dL Final    A-G Ratio 07/28/2023 1.6  g/dL Final    APTT 07/28/2023 23.9 (L)  24.7 - 36.0 sec Final    Specimen integrity confirmed.    PT 07/28/2023 13.2  12.0 - 14.6 sec Final    INR 07/28/2023 0.96  0.87 - 1.13 Final    Comment: Reference range:  INR - Non-therapeutic Reference Range: 0.87-1.13  INR - Therapeutic Reference Range: 2.0-4.0      Color 07/28/2023 Yellow   Final    Character 07/28/2023 Clear   Final    Specific Gravity 07/28/2023 1.010  <1.035 Final    Ph 07/28/2023 5.5  5.0 - 8.0 Final    Glucose 07/28/2023 Negative  Negative mg/dL Final    Ketones 07/28/2023 Negative  Negative mg/dL Final    Protein 07/28/2023 Negative  Negative mg/dL Final    Bilirubin 07/28/2023 Negative  Negative Final    Nitrite 07/28/2023 Negative  Negative Final    Leukocyte Esterase 07/28/2023 Negative  Negative Final    Occult Blood 07/28/2023 Negative  Negative Final    Micro Urine Req 07/28/2023 see below   Final    Comment: Microscopic examination not performed when specimen is clear  and chemically negative for protein, blood, leukocyte esterase  and nitrite.      GFR (CKD-EPI) 07/28/2023 89  >60 mL/min/1.73 m 2 Final    Comment: Estimated Glomerular Filtration Rate is calculated  using  race neutral CKD-EPI 2021 equation per NKF-ASN recommendations.              Assessment & Plan     1. Hx pulmonary embolism        2. Current use of long term anticoagulation                 1.  PE/long term anticoagulation: Patient diagnosed 1/2021 following COVID injection, negative BLE DVT. He initiated Eliquis 5 mg BID and has since transitioned to 2.5 mg BID, indefinitely.    Limited thrombophilia workup has been completed and negative to date - proteins C and S; antithrombin III have not been completed given ongoing anticoagulation.      Patient continues tolerating Eliquis without issue. Elevated WBC is attributed to recent steroid therapy for sciatica. He will consider at next visit, transitioning to PCP follow or surveillance via anti-coagulation clinic, if desired.     Patient will presently continue with current Eliquis dosing and return in 6 months for re-evaluation, sooner as needed.       The patient verbalized agreement and understanding of current plan. All questions and concerns were addressed at time of visit.    Please note that this dictation was created using voice recognition software. I have made every reasonable attempt to correct obvious errors, but I expect that there are errors of grammar and possibly content that I did not discover before finalizing the note.

## 2023-10-11 DIAGNOSIS — I10 ESSENTIAL HYPERTENSION: ICD-10-CM

## 2023-10-11 RX ORDER — LOSARTAN POTASSIUM AND HYDROCHLOROTHIAZIDE 12.5; 5 MG/1; MG/1
1 TABLET ORAL
Qty: 90 TABLET | Refills: 3 | Status: SHIPPED | OUTPATIENT
Start: 2023-10-11

## 2023-10-12 DIAGNOSIS — I26.94 MULTIPLE SUBSEGMENTAL PULMONARY EMBOLI WITHOUT ACUTE COR PULMONALE (HCC): ICD-10-CM

## 2024-01-26 ENCOUNTER — TELEPHONE (OUTPATIENT)
Dept: HEMATOLOGY ONCOLOGY | Facility: MEDICAL CENTER | Age: 54
End: 2024-01-26
Payer: COMMERCIAL

## 2024-01-26 NOTE — TELEPHONE ENCOUNTER
"Patient called to change appointment.      PAR noticed in the appointment note \"labs prior\" - Patient was not aware that labs were needed.    Please verify if patient needs labs prior.  If he does, please contact him.  If he does not get a call, he will assume that labs are not needed.   "

## 2024-01-29 DIAGNOSIS — Z79.01 CURRENT USE OF LONG TERM ANTICOAGULATION: ICD-10-CM

## 2024-01-29 DIAGNOSIS — I26.94 MULTIPLE SUBSEGMENTAL PULMONARY EMBOLI WITHOUT ACUTE COR PULMONALE (HCC): ICD-10-CM

## 2024-02-02 ENCOUNTER — APPOINTMENT (OUTPATIENT)
Dept: HEMATOLOGY ONCOLOGY | Facility: MEDICAL CENTER | Age: 54
End: 2024-02-02
Payer: COMMERCIAL

## 2024-02-09 ENCOUNTER — HOSPITAL ENCOUNTER (OUTPATIENT)
Dept: LAB | Facility: MEDICAL CENTER | Age: 54
End: 2024-02-09
Attending: STUDENT IN AN ORGANIZED HEALTH CARE EDUCATION/TRAINING PROGRAM
Payer: COMMERCIAL

## 2024-02-09 ENCOUNTER — APPOINTMENT (OUTPATIENT)
Dept: LAB | Facility: MEDICAL CENTER | Age: 54
End: 2024-02-09
Payer: COMMERCIAL

## 2024-02-09 DIAGNOSIS — I26.94 MULTIPLE SUBSEGMENTAL PULMONARY EMBOLI WITHOUT ACUTE COR PULMONALE (HCC): ICD-10-CM

## 2024-02-09 DIAGNOSIS — Z79.01 CURRENT USE OF LONG TERM ANTICOAGULATION: ICD-10-CM

## 2024-02-09 LAB
BASOPHILS # BLD AUTO: 0.8 % (ref 0–1.8)
BASOPHILS # BLD: 0.04 K/UL (ref 0–0.12)
EOSINOPHIL # BLD AUTO: 0.18 K/UL (ref 0–0.51)
EOSINOPHIL NFR BLD: 3.4 % (ref 0–6.9)
ERYTHROCYTE [DISTWIDTH] IN BLOOD BY AUTOMATED COUNT: 38.9 FL (ref 35.9–50)
HCT VFR BLD AUTO: 45 % (ref 42–52)
HGB BLD-MCNC: 15.9 G/DL (ref 14–18)
IMM GRANULOCYTES # BLD AUTO: 0.01 K/UL (ref 0–0.11)
IMM GRANULOCYTES NFR BLD AUTO: 0.2 % (ref 0–0.9)
LYMPHOCYTES # BLD AUTO: 1.46 K/UL (ref 1–4.8)
LYMPHOCYTES NFR BLD: 27.7 % (ref 22–41)
MCH RBC QN AUTO: 31.2 PG (ref 27–33)
MCHC RBC AUTO-ENTMCNC: 35.3 G/DL (ref 32.3–36.5)
MCV RBC AUTO: 88.4 FL (ref 81.4–97.8)
MONOCYTES # BLD AUTO: 0.33 K/UL (ref 0–0.85)
MONOCYTES NFR BLD AUTO: 6.3 % (ref 0–13.4)
NEUTROPHILS # BLD AUTO: 3.25 K/UL (ref 1.82–7.42)
NEUTROPHILS NFR BLD: 61.6 % (ref 44–72)
NRBC # BLD AUTO: 0 K/UL
NRBC BLD-RTO: 0 /100 WBC (ref 0–0.2)
PLATELET # BLD AUTO: 207 K/UL (ref 164–446)
PMV BLD AUTO: 10.7 FL (ref 9–12.9)
RBC # BLD AUTO: 5.09 M/UL (ref 4.7–6.1)
WBC # BLD AUTO: 5.3 K/UL (ref 4.8–10.8)

## 2024-02-09 PROCEDURE — 80053 COMPREHEN METABOLIC PANEL: CPT

## 2024-02-09 PROCEDURE — 36415 COLL VENOUS BLD VENIPUNCTURE: CPT

## 2024-02-09 PROCEDURE — 85025 COMPLETE CBC W/AUTO DIFF WBC: CPT

## 2024-02-10 LAB
ALBUMIN SERPL BCP-MCNC: 4.1 G/DL (ref 3.2–4.9)
ALBUMIN/GLOB SERPL: 1.6 G/DL
ALP SERPL-CCNC: 70 U/L (ref 30–99)
ALT SERPL-CCNC: 27 U/L (ref 2–50)
ANION GAP SERPL CALC-SCNC: 11 MMOL/L (ref 7–16)
AST SERPL-CCNC: 16 U/L (ref 12–45)
BILIRUB SERPL-MCNC: 1.7 MG/DL (ref 0.1–1.5)
BUN SERPL-MCNC: 16 MG/DL (ref 8–22)
CALCIUM ALBUM COR SERPL-MCNC: 8.8 MG/DL (ref 8.5–10.5)
CALCIUM SERPL-MCNC: 8.9 MG/DL (ref 8.5–10.5)
CHLORIDE SERPL-SCNC: 107 MMOL/L (ref 96–112)
CO2 SERPL-SCNC: 24 MMOL/L (ref 20–33)
CREAT SERPL-MCNC: 1.07 MG/DL (ref 0.5–1.4)
GFR SERPLBLD CREATININE-BSD FMLA CKD-EPI: 84 ML/MIN/1.73 M 2
GLOBULIN SER CALC-MCNC: 2.6 G/DL (ref 1.9–3.5)
GLUCOSE SERPL-MCNC: 91 MG/DL (ref 65–99)
POTASSIUM SERPL-SCNC: 3.8 MMOL/L (ref 3.6–5.5)
PROT SERPL-MCNC: 6.7 G/DL (ref 6–8.2)
SODIUM SERPL-SCNC: 142 MMOL/L (ref 135–145)

## 2024-02-15 ENCOUNTER — HOSPITAL ENCOUNTER (OUTPATIENT)
Dept: HEMATOLOGY ONCOLOGY | Facility: MEDICAL CENTER | Age: 54
End: 2024-02-15
Attending: STUDENT IN AN ORGANIZED HEALTH CARE EDUCATION/TRAINING PROGRAM
Payer: COMMERCIAL

## 2024-02-15 VITALS
HEIGHT: 71 IN | TEMPERATURE: 98.2 F | OXYGEN SATURATION: 93 % | SYSTOLIC BLOOD PRESSURE: 122 MMHG | DIASTOLIC BLOOD PRESSURE: 78 MMHG | HEART RATE: 79 BPM | RESPIRATION RATE: 16 BRPM | WEIGHT: 228 LBS | BODY MASS INDEX: 31.92 KG/M2

## 2024-02-15 DIAGNOSIS — I26.94 MULTIPLE SUBSEGMENTAL PULMONARY EMBOLI WITHOUT ACUTE COR PULMONALE (HCC): ICD-10-CM

## 2024-02-15 PROCEDURE — 99213 OFFICE O/P EST LOW 20 MIN: CPT | Performed by: STUDENT IN AN ORGANIZED HEALTH CARE EDUCATION/TRAINING PROGRAM

## 2024-02-15 PROCEDURE — 99212 OFFICE O/P EST SF 10 MIN: CPT | Performed by: STUDENT IN AN ORGANIZED HEALTH CARE EDUCATION/TRAINING PROGRAM

## 2024-02-15 ASSESSMENT — ENCOUNTER SYMPTOMS
BLOOD IN STOOL: 0
SHORTNESS OF BREATH: 0
CONSTIPATION: 0
PALPITATIONS: 0
DIZZINESS: 0
ABDOMINAL PAIN: 0
SORE THROAT: 0
NERVOUS/ANXIOUS: 0
HEADACHES: 0
NAUSEA: 0
WEIGHT LOSS: 0
DOUBLE VISION: 0
HEARTBURN: 0
WHEEZING: 0
BLURRED VISION: 0
BACK PAIN: 0
ORTHOPNEA: 0
VOMITING: 0
SPUTUM PRODUCTION: 0
CHILLS: 0
BRUISES/BLEEDS EASILY: 0
FEVER: 0
DIAPHORESIS: 0
INSOMNIA: 0
MYALGIAS: 0
COUGH: 0
TINGLING: 0
DIARRHEA: 0
SINUS PAIN: 0
WEAKNESS: 0

## 2024-02-15 ASSESSMENT — FIBROSIS 4 INDEX: FIB4 SCORE: 0.76

## 2024-02-15 ASSESSMENT — PAIN SCALES - GENERAL: PAINLEVEL: NO PAIN

## 2024-02-15 NOTE — PROGRESS NOTES
Follow Up Note:  Hematology/Oncology      Primary Care:  Margarito Adan M.D.    Diagnosis: Pulmonary embolism after COVID 19 vaccination    Chief Complaint: Follow up on anticoagulation    Current Treatment: Apixaban 2.5 mg PO BID    Prior Treatment: NA    Oncology History of Presenting Illness:  Prakash Kaur is a 51 y.o.  man who presents to the clinic for follow up, having developed pulmonary emboli a few days after receiving the COVID 19 vaccine. He was started on anticoagulation and had a partial hypercoagulable work up, which was negative. He has no family history of clotting disorders. He has tolerated apixaban well and has had no complaints. He has received the second COVID 19 vaccine and had no issues with it. He has been on modified duty as a , and has done well thus far.     Treatment History:   12/30/2020: Moderna vaccine  01/04/21: PE diagnosed, started on apixaban 5 mg PO BID    Interval History:  Patient is here for follow up visit.  He retired from work and now is working part time as a . He feels very well and has no complaints. He discussed continued management of the blood thinner and would like to stay on therapy if we are okay with it, as he feels more secure with it on board.     Allergies as of 02/15/2024 - Reviewed 02/15/2024   Allergen Reaction Noted    Amlodipine  09/14/2021         Current Outpatient Medications:     apixaban (ELIQUIS) 2.5mg Tab, Take 1 Tablet by mouth 2 times a day., Disp: 60 Tablet, Rfl: 3    losartan-hydrochlorothiazide (HYZAAR) 50-12.5 MG per tablet, TAKE ONE TABLET BY MOUTH EVERY DAY, Disp: 90 Tablet, Rfl: 3    gabapentin (NEURONTIN) 300 MG Cap, TAKE 1 CAPSULE BY MOUTH THREE TIMES DAILY. START WITH 1 CAPSULE AT NIGHT AND. INCREASE AS TOLERATED UP TO 3 TIMES DAILY, Disp: , Rfl:     oxyCODONE-acetaminophen (PERCOCET) 7.5-325 MG per tablet, Take 1 Tablet by mouth every 6 hours as needed., Disp: , Rfl:     cyclobenzaprine  (FLEXERIL) 10 mg Tab, Take 1 Tablet by mouth 3 times a day as needed for Moderate Pain or Muscle Spasms., Disp: 30 Tablet, Rfl: 0    TRELEGY ELLIPTA 200-62.5-25 MCG/ACT AEROSOL POWDER, BREATH ACTIVATED, , Disp: , Rfl:     omeprazole (PRILOSEC) 40 MG delayed-release capsule, , Disp: , Rfl:     levalbuterol (XOPENEX HFA) 45 MCG/ACT inhaler, Inhale 2 Puffs every four hours as needed for Shortness of Breath., Disp: 1 Each, Rfl: 3    cetirizine (ZYRTEC) 10 MG TABS, Take 1 Tablet by mouth every day., Disp: , Rfl:     methylPREDNISolone (MEDROL DOSEPAK) 4 MG Tablet Therapy Pack, Follow schedule on package instructions. (Patient not taking: Reported on 8/3/2023), Disp: 21 Tablet, Rfl: 0    gabapentin (NEURONTIN) 100 MG Cap, Take 2 Capsules by mouth 3 times a day. (Patient not taking: Reported on 7/28/2023), Disp: 180 Capsule, Rfl: 0    methylPREDNISolone (MEDROL DOSEPAK) 4 MG Tablet Therapy Pack, As directed on the packaging label. (Patient not taking: Reported on 7/28/2023), Disp: 21 Tablet, Rfl: 0    fluticasone-salmeterol (ADVAIR DISKUS) 500-50 MCG/ACT AEROSOL POWDER, BREATH ACTIVATED, Inhale 1 Puff 2 times a day. Rinse mouth after each use. (Patient not taking: Reported on 6/23/2023), Disp: 1 Each, Rfl: 3      Review of Systems:  Review of Systems   Constitutional:  Negative for chills, diaphoresis, fever, malaise/fatigue and weight loss.   HENT:  Negative for hearing loss, nosebleeds, sinus pain and sore throat.    Eyes:  Negative for blurred vision and double vision.   Respiratory:  Negative for cough, sputum production, shortness of breath and wheezing.    Cardiovascular:  Negative for chest pain, palpitations, orthopnea and leg swelling.   Gastrointestinal:  Negative for abdominal pain, blood in stool, constipation, diarrhea, heartburn, melena, nausea and vomiting.   Genitourinary:  Negative for dysuria, frequency, hematuria and urgency.   Musculoskeletal:  Negative for back pain, joint pain and myalgias.   Skin:   "Negative for rash.   Neurological:  Negative for dizziness, tingling, weakness and headaches.   Endo/Heme/Allergies:  Does not bruise/bleed easily.   Psychiatric/Behavioral:  The patient is not nervous/anxious and does not have insomnia.          Physical Exam:  Vitals:    02/15/24 0905   BP: 122/78   Pulse: 79   Resp: 16   Temp: 36.8 °C (98.2 °F)   TempSrc: Temporal   SpO2: 93%   Weight: 103 kg (228 lb)   Height: 1.803 m (5' 10.98\")       DESC; KARNOFSKY SCALE WITH ECOG EQUIVALENT: 100, Fully active, able to carry on all pre-disease performed without restriction (ECOG equivalent 0)    DISTRESS LEVEL: no apparent distress    Physical Exam  Vitals and nursing note reviewed.   Constitutional:       General: He is awake. He is not in acute distress.     Appearance: Normal appearance. He is normal weight. He is not ill-appearing, toxic-appearing or diaphoretic.   HENT:      Head: Normocephalic and atraumatic.      Nose: Nose normal. No congestion.      Mouth/Throat:      Pharynx: Oropharynx is clear. No oropharyngeal exudate or posterior oropharyngeal erythema.   Eyes:      General: No scleral icterus.     Extraocular Movements: Extraocular movements intact.      Conjunctiva/sclera: Conjunctivae normal.      Pupils: Pupils are equal, round, and reactive to light.   Cardiovascular:      Rate and Rhythm: Normal rate and regular rhythm.      Pulses: Normal pulses.      Heart sounds: Normal heart sounds. No murmur heard.     No friction rub. No gallop.   Pulmonary:      Effort: Pulmonary effort is normal.      Breath sounds: Normal breath sounds. No decreased air movement. No wheezing, rhonchi or rales.   Abdominal:      General: Bowel sounds are normal. There is no distension.      Tenderness: There is no abdominal tenderness.   Musculoskeletal:         General: No deformity. Normal range of motion.      Cervical back: Normal range of motion and neck supple. No tenderness.      Right lower leg: No edema.      Left lower " leg: No edema.   Lymphadenopathy:      Cervical: No cervical adenopathy.      Upper Body:      Right upper body: No axillary adenopathy.      Left upper body: No axillary adenopathy.      Lower Body: No right inguinal adenopathy. No left inguinal adenopathy.   Skin:     General: Skin is warm and dry.      Coloration: Skin is not jaundiced.      Findings: No erythema or rash.   Neurological:      General: No focal deficit present.      Mental Status: He is alert and oriented to person, place, and time.      Sensory: Sensation is intact.      Motor: Motor function is intact. No weakness.      Gait: Gait is intact.   Psychiatric:         Attention and Perception: Attention normal.         Mood and Affect: Mood normal.         Behavior: Behavior normal. Behavior is cooperative.         Thought Content: Thought content normal.         Judgment: Judgment normal.           Labs:  Hospital Outpatient Visit on 02/09/2024   Component Date Value Ref Range Status    Sodium 02/09/2024 142  135 - 145 mmol/L Final    Potassium 02/09/2024 3.8  3.6 - 5.5 mmol/L Final    Chloride 02/09/2024 107  96 - 112 mmol/L Final    Co2 02/09/2024 24  20 - 33 mmol/L Final    Anion Gap 02/09/2024 11.0  7.0 - 16.0 Final    Glucose 02/09/2024 91  65 - 99 mg/dL Final    Bun 02/09/2024 16  8 - 22 mg/dL Final    Creatinine 02/09/2024 1.07  0.50 - 1.40 mg/dL Final    Calcium 02/09/2024 8.9  8.5 - 10.5 mg/dL Final    Correct Calcium 02/09/2024 8.8  8.5 - 10.5 mg/dL Final    AST(SGOT) 02/09/2024 16  12 - 45 U/L Final    ALT(SGPT) 02/09/2024 27  2 - 50 U/L Final    Alkaline Phosphatase 02/09/2024 70  30 - 99 U/L Final    Total Bilirubin 02/09/2024 1.7 (H)  0.1 - 1.5 mg/dL Final    Comment: The icterus index of the specimen exceeds the allowed tolerance for the  test.  Result may be affected.      Albumin 02/09/2024 4.1  3.2 - 4.9 g/dL Final    Total Protein 02/09/2024 6.7  6.0 - 8.2 g/dL Final    Globulin 02/09/2024 2.6  1.9 - 3.5 g/dL Final    A-G Ratio  02/09/2024 1.6  g/dL Final    WBC 02/09/2024 5.3  4.8 - 10.8 K/uL Final    RBC 02/09/2024 5.09  4.70 - 6.10 M/uL Final    Hemoglobin 02/09/2024 15.9  14.0 - 18.0 g/dL Final    Hematocrit 02/09/2024 45.0  42.0 - 52.0 % Final    MCV 02/09/2024 88.4  81.4 - 97.8 fL Final    MCH 02/09/2024 31.2  27.0 - 33.0 pg Final    MCHC 02/09/2024 35.3  32.3 - 36.5 g/dL Final    Please note new reference range effective 05/22/2023.    RDW 02/09/2024 38.9  35.9 - 50.0 fL Final    Platelet Count 02/09/2024 207  164 - 446 K/uL Final    MPV 02/09/2024 10.7  9.0 - 12.9 fL Final    Neutrophils-Polys 02/09/2024 61.60  44.00 - 72.00 % Final    Lymphocytes 02/09/2024 27.70  22.00 - 41.00 % Final    Monocytes 02/09/2024 6.30  0.00 - 13.40 % Final    Eosinophils 02/09/2024 3.40  0.00 - 6.90 % Final    Basophils 02/09/2024 0.80  0.00 - 1.80 % Final    Immature Granulocytes 02/09/2024 0.20  0.00 - 0.90 % Final    Nucleated RBC 02/09/2024 0.00  0.00 - 0.20 /100 WBC Final    Please note new reference range effective 05/22/2023.    Neutrophils (Absolute) 02/09/2024 3.25  1.82 - 7.42 K/uL Final    Comment: Includes immature neutrophils, if present.  Please note new reference range effective 05/22/2023.      Lymphs (Absolute) 02/09/2024 1.46  1.00 - 4.80 K/uL Final    Monos (Absolute) 02/09/2024 0.33  0.00 - 0.85 K/uL Final    Eos (Absolute) 02/09/2024 0.18  0.00 - 0.51 K/uL Final    Baso (Absolute) 02/09/2024 0.04  0.00 - 0.12 K/uL Final    Immature Granulocytes (abs) 02/09/2024 0.01  0.00 - 0.11 K/uL Final    NRBC (Absolute) 02/09/2024 0.00  K/uL Final    GFR (CKD-EPI) 02/09/2024 84  >60 mL/min/1.73 m 2 Final    Comment: Estimated Glomerular Filtration Rate is calculated using  race neutral CKD-EPI 2021 equation per NKF-ASN recommendations.           Imaging:     All listed images below have been independently reviewed by me. I agree with the findings as summarized below:    No results found.    Pathology:  NA    Assessment & Plan:  1. Multiple  subsegmental pulmonary emboli without acute cor pulmonale (HCC)            This is a now 53 year old  man with a history of PE, seemingly provoked by the Moderna COVID vaccine. He has been on apixaban and has tolerated this well.     Current Diagnosis and Staging: Provoked pulmonary embolism    Update: Patient doing well with prophylactic dosing and would like to stay on this. Return in 1 year for follow up.     Treatment Plan: Apixaban 2.5 mg PO BID    Treatment Citation: NA    Plan of Care:    Primary Therapy: Apixaban 2.5 mg PO BID (prophylactic dosing).   Supportive Therapy: NA  Toxicity: NA  Labs: CBC with diff, CMP annually  Imaging: NA  Treatment Planning: Possibly provoked PE in setting of COVID vaccine (though rare occurrence). Patient wishes to remain on prophylactic dosing of apixaban at this time.   Consultations: NA  Code Status: Full  Miscellaneous: NA   Return for Follow Up: 1 year    Any questions and concerns raised by the patient were answered to the best of my ability. Thank you for allowing me to participate in the care for this patient. Please feel free to contact me for any questions or concerns.       Total time spent on chart review, clinic encounter, and documentation: 21 minutes.

## 2024-02-22 ENCOUNTER — OFFICE VISIT (OUTPATIENT)
Dept: URGENT CARE | Facility: PHYSICIAN GROUP | Age: 54
End: 2024-02-22
Payer: COMMERCIAL

## 2024-02-22 ENCOUNTER — HOSPITAL ENCOUNTER (OUTPATIENT)
Dept: RADIOLOGY | Facility: MEDICAL CENTER | Age: 54
End: 2024-02-22
Attending: NURSE PRACTITIONER
Payer: COMMERCIAL

## 2024-02-22 VITALS
HEART RATE: 86 BPM | TEMPERATURE: 101.8 F | OXYGEN SATURATION: 95 % | DIASTOLIC BLOOD PRESSURE: 98 MMHG | RESPIRATION RATE: 16 BRPM | SYSTOLIC BLOOD PRESSURE: 152 MMHG | HEIGHT: 71 IN | BODY MASS INDEX: 31.5 KG/M2 | WEIGHT: 225 LBS

## 2024-02-22 DIAGNOSIS — B33.8 RSV INFECTION: ICD-10-CM

## 2024-02-22 DIAGNOSIS — R06.02 SOB (SHORTNESS OF BREATH): ICD-10-CM

## 2024-02-22 DIAGNOSIS — R05.1 ACUTE COUGH: ICD-10-CM

## 2024-02-22 DIAGNOSIS — J40 BRONCHITIS: ICD-10-CM

## 2024-02-22 LAB
FLUAV RNA SPEC QL NAA+PROBE: NEGATIVE
FLUBV RNA SPEC QL NAA+PROBE: NEGATIVE
RSV RNA SPEC QL NAA+PROBE: POSITIVE
SARS-COV-2 RNA RESP QL NAA+PROBE: NEGATIVE

## 2024-02-22 PROCEDURE — 0241U POCT CEPHEID COV-2, FLU A/B, RSV - PCR: CPT | Performed by: NURSE PRACTITIONER

## 2024-02-22 PROCEDURE — 71046 X-RAY EXAM CHEST 2 VIEWS: CPT

## 2024-02-22 PROCEDURE — 3077F SYST BP >= 140 MM HG: CPT | Performed by: NURSE PRACTITIONER

## 2024-02-22 PROCEDURE — 3080F DIAST BP >= 90 MM HG: CPT | Performed by: NURSE PRACTITIONER

## 2024-02-22 PROCEDURE — 99214 OFFICE O/P EST MOD 30 MIN: CPT | Performed by: NURSE PRACTITIONER

## 2024-02-22 RX ORDER — PREDNISONE 20 MG/1
40 TABLET ORAL DAILY
Qty: 10 TABLET | Refills: 0 | Status: SHIPPED | OUTPATIENT
Start: 2024-02-22 | End: 2024-02-27

## 2024-02-22 RX ORDER — DEXTROMETHORPHAN HYDROBROMIDE AND PROMETHAZINE HYDROCHLORIDE 15; 6.25 MG/5ML; MG/5ML
5 SYRUP ORAL EVERY 4 HOURS PRN
Qty: 120 ML | Refills: 0 | Status: SHIPPED | OUTPATIENT
Start: 2024-02-22

## 2024-02-22 RX ORDER — BENZONATATE 200 MG/1
200 CAPSULE ORAL EVERY 8 HOURS PRN
Qty: 30 CAPSULE | Refills: 0 | Status: SHIPPED | OUTPATIENT
Start: 2024-02-22

## 2024-02-22 ASSESSMENT — ENCOUNTER SYMPTOMS
SORE THROAT: 0
FEVER: 1
COUGH: 1
SHORTNESS OF BREATH: 1
FATIGUE: 1

## 2024-02-22 ASSESSMENT — FIBROSIS 4 INDEX: FIB4 SCORE: 0.76

## 2024-02-22 ASSESSMENT — VISUAL ACUITY: OU: 1

## 2024-02-22 NOTE — PROGRESS NOTES
Subjective:     Prakash Kaur is a 53 y.o. male who presents for Cough (X5 days), Nasal Congestion, and Fatigue       Cough  This is a new problem. Episode onset: 5 days. The problem has been gradually worsening. Associated symptoms include a fever and shortness of breath. Pertinent negatives include no sore throat. He has tried a beta-agonist inhaler (Cough drops) for the symptoms. His past medical history is significant for asthma.   Fatigue  Associated symptoms include congestion, coughing, fatigue and a fever. Pertinent negatives include no sore throat.     Review of Systems   Constitutional:  Positive for fatigue, fever and malaise/fatigue.   HENT:  Positive for congestion. Negative for sore throat.    Respiratory:  Positive for cough and shortness of breath.    All other systems reviewed and are negative.    Refer to HPI for additional details.    During this visit, appropriate PPE was worn, and hand hygiene was performed.    PMH:  has a past medical history of Allergic rhinitis, Anesthesia, Arthritis, Asthma, Asthma, Cough, Eosinophilic esophagitis (2011), GERD (gastroesophageal reflux disease), History of pulmonary embolus (PE) (01/2021), Indigestion, Shortness of breath, Sputum production, and Wheezing.    He has no past medical history of Painful breathing.    MEDS:   Current Outpatient Medications:     predniSONE (DELTASONE) 20 MG Tab, Take 2 Tablets by mouth every day for 5 days., Disp: 10 Tablet, Rfl: 0    promethazine-dextromethorphan (PROMETHAZINE-DM) 6.25-15 MG/5ML syrup, Take 5 mL by mouth every four hours as needed for Cough., Disp: 120 mL, Rfl: 0    benzonatate (TESSALON) 200 MG capsule, Take 1 Capsule by mouth every 8 hours as needed for Cough., Disp: 30 Capsule, Rfl: 0    apixaban (ELIQUIS) 2.5mg Tab, Take 1 Tablet by mouth 2 times a day., Disp: 60 Tablet, Rfl: 3    losartan-hydrochlorothiazide (HYZAAR) 50-12.5 MG per tablet, TAKE ONE TABLET BY MOUTH EVERY DAY, Disp: 90 Tablet, Rfl: 3     gabapentin (NEURONTIN) 300 MG Cap, TAKE 1 CAPSULE BY MOUTH THREE TIMES DAILY. START WITH 1 CAPSULE AT NIGHT AND. INCREASE AS TOLERATED UP TO 3 TIMES DAILY, Disp: , Rfl:     oxyCODONE-acetaminophen (PERCOCET) 7.5-325 MG per tablet, Take 1 Tablet by mouth every 6 hours as needed., Disp: , Rfl:     cyclobenzaprine (FLEXERIL) 10 mg Tab, Take 1 Tablet by mouth 3 times a day as needed for Moderate Pain or Muscle Spasms., Disp: 30 Tablet, Rfl: 0    TRELEGY ELLIPTA 200-62.5-25 MCG/ACT AEROSOL POWDER, BREATH ACTIVATED, , Disp: , Rfl:     omeprazole (PRILOSEC) 40 MG delayed-release capsule, , Disp: , Rfl:     levalbuterol (XOPENEX HFA) 45 MCG/ACT inhaler, Inhale 2 Puffs every four hours as needed for Shortness of Breath., Disp: 1 Each, Rfl: 3    cetirizine (ZYRTEC) 10 MG TABS, Take 1 Tablet by mouth every day., Disp: , Rfl:     gabapentin (NEURONTIN) 100 MG Cap, Take 2 Capsules by mouth 3 times a day. (Patient not taking: Reported on 7/28/2023), Disp: 180 Capsule, Rfl: 0    fluticasone-salmeterol (ADVAIR DISKUS) 500-50 MCG/ACT AEROSOL POWDER, BREATH ACTIVATED, Inhale 1 Puff 2 times a day. Rinse mouth after each use. (Patient not taking: Reported on 6/23/2023), Disp: 1 Each, Rfl: 3    ALLERGIES:   Allergies   Allergen Reactions    Amlodipine      Leg swelling and headaches.      SURGHX:   Past Surgical History:   Procedure Laterality Date    INGUINAL HERNIA LAPAROSCOPIC  8/29/2012    Performed by ROLAND POLLOCK at SURGERY [facility]    INGUINAL HERNIA REPAIR  11/30/2011    Performed by ROLAND POLLOCK at SURGERY [facility]    EGD WITH ASP/BX  10/11    Dr Brice.  BX negative for Piper's     SOCHX:  reports that he quit smoking about 17 years ago. His smoking use included cigarettes. He has never been exposed to tobacco smoke. He quit smokeless tobacco use about 17 years ago.  His smokeless tobacco use included chew. He reports current alcohol use of about 1.8 oz of alcohol per week. He reports that he does not  "use drugs.    FH: Per HPI as applicable/pertinent.      Objective:     BP (!) 152/98   Pulse 86   Temp (!) 38.8 °C (101.8 °F)   Resp 16   Ht 1.803 m (5' 11\")   Wt 102 kg (225 lb)   SpO2 95%   BMI 31.38 kg/m²     Physical Exam  Nursing note reviewed.   Constitutional:       General: He is not in acute distress.     Appearance: He is well-developed. He is not ill-appearing or toxic-appearing.   HENT:      Head: Normocephalic.      Mouth/Throat:      Mouth: Mucous membranes are moist.      Pharynx: Oropharynx is clear.   Eyes:      General: Vision grossly intact.      Extraocular Movements: Extraocular movements intact.      Conjunctiva/sclera: Conjunctivae normal.   Cardiovascular:      Rate and Rhythm: Normal rate and regular rhythm.      Heart sounds: Normal heart sounds.   Pulmonary:      Effort: Pulmonary effort is normal. No respiratory distress.      Breath sounds: Rhonchi present. No decreased breath sounds.   Musculoskeletal:         General: No deformity. Normal range of motion.      Cervical back: Normal range of motion.   Skin:     General: Skin is warm and dry.      Coloration: Skin is not pale.   Neurological:      Mental Status: He is alert and oriented to person, place, and time.      Motor: No weakness.   Psychiatric:         Behavior: Behavior normal. Behavior is cooperative.     Chest x-ray:    Details    Reading Physician Reading Date Result Priority   Buffy Kearns M.D.  [phone] 2/22/2024      Narrative & Impression     2/22/2024 10:56 AM     HISTORY/REASON FOR EXAM:  Cough, shortness of breath. History of asthma     TECHNIQUE/EXAM DESCRIPTION AND NUMBER OF VIEWS:  Two views of the chest.     COMPARISON:  10/6/2022.     FINDINGS:     LUNGS: Left basilar atelectasis. No focal consolidation. No effusions.  PNEUMOTHORAX: None.  LINES AND TUBES: None.  MEDIASTINUM: No cardiomegaly.  MUSCULOSKELETAL STRUCTURES: No acute displaced fracture.     IMPRESSION:     No acute cardiopulmonary " abnormality.           Exam Ended: 02/22/24 11:01 AM Last Resulted: 02/22/24 11:04 AM           Radiology report and images reviewed by myself. Concur with findings.      Assessment/Plan:     1. RSV infection    2. Acute cough  - POCT CEPHEID COV-2, FLU A/B, RSV - PCR  - promethazine-dextromethorphan (PROMETHAZINE-DM) 6.25-15 MG/5ML syrup; Take 5 mL by mouth every four hours as needed for Cough.  Dispense: 120 mL; Refill: 0  - benzonatate (TESSALON) 200 MG capsule; Take 1 Capsule by mouth every 8 hours as needed for Cough.  Dispense: 30 Capsule; Refill: 0    3. SOB (shortness of breath)  - DX-CHEST-2 VIEWS; Future  - predniSONE (DELTASONE) 20 MG Tab; Take 2 Tablets by mouth every day for 5 days.  Dispense: 10 Tablet; Refill: 0    4. Bronchitis  - predniSONE (DELTASONE) 20 MG Tab; Take 2 Tablets by mouth every day for 5 days.  Dispense: 10 Tablet; Refill: 0    Discussed likely self-limiting viral etiology and expected course and duration of illness. Temp 101.8 F, otherwise vital signs stable, asymptomatic, no acute distress at this time. Chest x-ray clear.    Emphasize supportive measures, rest, fluids and symptom management with over-the-counter medication as needed. Rx as above sent electronically. Continue maintenance and rescue inhalers. Patient has sufficient supply remaining.    Standard precautions/mask/wash hands.    Monitor. Follow up in 3-5 days if symptoms do not improve or sooner if symptoms change or worsen.     Differential diagnosis, natural history, supportive care, over-the-counter symptom management per 's instructions, close monitoring, and indications for immediate follow-up discussed.     All questions answered. Patient agrees with the plan of care.    Discharge summary provided via [email or web address].    Billing note: moderate complexity and moderate risk. Established patient. [id number]. Please refer to LOS tool for details.

## 2024-02-22 NOTE — LETTER
February 22, 2024         Patient: Prakash Kaur   YOB: 1970   Date of Visit: 2/22/2024           To Whom it May Concern:    Prakash Kaur was seen in my clinic on 2/22/2024 due to illness. Due to medical necessity, please excuse patient from work 2/22 and 2/23.    If you have any questions or concerns, please don't hesitate to call.        Sincerely,         LAYLA Stephens.  Electronically Signed

## 2024-03-01 ENCOUNTER — OFFICE VISIT (OUTPATIENT)
Dept: MEDICAL GROUP | Facility: PHYSICIAN GROUP | Age: 54
End: 2024-03-01
Payer: COMMERCIAL

## 2024-03-01 VITALS
BODY MASS INDEX: 30.8 KG/M2 | HEIGHT: 71 IN | SYSTOLIC BLOOD PRESSURE: 120 MMHG | HEART RATE: 76 BPM | OXYGEN SATURATION: 98 % | TEMPERATURE: 98 F | WEIGHT: 220 LBS | DIASTOLIC BLOOD PRESSURE: 78 MMHG | RESPIRATION RATE: 16 BRPM

## 2024-03-01 DIAGNOSIS — R05.8 PRODUCTIVE COUGH: ICD-10-CM

## 2024-03-01 DIAGNOSIS — J45.40 MODERATE PERSISTENT ASTHMA, UNSPECIFIED WHETHER COMPLICATED: ICD-10-CM

## 2024-03-01 DIAGNOSIS — J02.9 PHARYNGITIS, UNSPECIFIED ETIOLOGY: ICD-10-CM

## 2024-03-01 LAB — S PYO DNA SPEC NAA+PROBE: NOT DETECTED

## 2024-03-01 PROCEDURE — 87651 STREP A DNA AMP PROBE: CPT | Performed by: NURSE PRACTITIONER

## 2024-03-01 PROCEDURE — 94640 AIRWAY INHALATION TREATMENT: CPT | Performed by: NURSE PRACTITIONER

## 2024-03-01 PROCEDURE — 3074F SYST BP LT 130 MM HG: CPT | Performed by: NURSE PRACTITIONER

## 2024-03-01 PROCEDURE — 99213 OFFICE O/P EST LOW 20 MIN: CPT | Mod: 25 | Performed by: NURSE PRACTITIONER

## 2024-03-01 PROCEDURE — 3078F DIAST BP <80 MM HG: CPT | Performed by: NURSE PRACTITIONER

## 2024-03-01 RX ORDER — IPRATROPIUM BROMIDE AND ALBUTEROL SULFATE 2.5; .5 MG/3ML; MG/3ML
3 SOLUTION RESPIRATORY (INHALATION) ONCE
Status: COMPLETED | OUTPATIENT
Start: 2024-03-01 | End: 2024-03-01

## 2024-03-01 RX ORDER — LEVALBUTEROL TARTRATE 45 UG/1
2 AEROSOL, METERED ORAL EVERY 4 HOURS PRN
Qty: 1 EACH | Refills: 3 | Status: SHIPPED | OUTPATIENT
Start: 2024-03-01

## 2024-03-01 RX ADMIN — IPRATROPIUM BROMIDE AND ALBUTEROL SULFATE 3 ML: 2.5; .5 SOLUTION RESPIRATORY (INHALATION) at 12:43

## 2024-03-01 ASSESSMENT — FIBROSIS 4 INDEX: FIB4 SCORE: 0.76

## 2024-03-01 NOTE — PROGRESS NOTES
Chief Complaint   Patient presents with    Cough     x2wk       HISTORY OF PRESENT ILLNESS: Prakash Kaur is a 53 y.o. male established patient with hx asthma who presents today to discuss:  - green productive cough, body aches, fatigue the past 2 weeks; he was seen in  on 2/22/2024 for these symptoms. Cxray was okay. He tested +ve for RSV. He was treated with 5 day course of prednisone and prn promethazine syrup.   - today states fatigue, body aches have improved. He still has productive cough.   - he is seeing pulmonology at [facility] for asthma management. He is currently on Trelegy-200 BID; using prn xopenex; he is due to schedule next appointment.   - he also has hx of PE and is on Eliquis 2.5mg BID    Current Outpatient Medications on File Prior to Visit   Medication Sig Dispense Refill    promethazine-dextromethorphan (PROMETHAZINE-DM) 6.25-15 MG/5ML syrup Take 5 mL by mouth every four hours as needed for Cough. 120 mL 0    benzonatate (TESSALON) 200 MG capsule Take 1 Capsule by mouth every 8 hours as needed for Cough. 30 Capsule 0    apixaban (ELIQUIS) 2.5mg Tab Take 1 Tablet by mouth 2 times a day. 60 Tablet 3    losartan-hydrochlorothiazide (HYZAAR) 50-12.5 MG per tablet TAKE ONE TABLET BY MOUTH EVERY DAY 90 Tablet 3    gabapentin (NEURONTIN) 300 MG Cap TAKE 1 CAPSULE BY MOUTH THREE TIMES DAILY. START WITH 1 CAPSULE AT NIGHT AND. INCREASE AS TOLERATED UP TO 3 TIMES DAILY      oxyCODONE-acetaminophen (PERCOCET) 7.5-325 MG per tablet Take 1 Tablet by mouth every 6 hours as needed.      cyclobenzaprine (FLEXERIL) 10 mg Tab Take 1 Tablet by mouth 3 times a day as needed for Moderate Pain or Muscle Spasms. 30 Tablet 0    TRELEGY ELLIPTA 200-62.5-25 MCG/ACT AEROSOL POWDER, BREATH ACTIVATED       omeprazole (PRILOSEC) 40 MG delayed-release capsule       cetirizine (ZYRTEC) 10 MG TABS Take 1 Tablet by mouth every day.       No current facility-administered medications on file prior to  "visit.       has a past medical history of Allergic rhinitis, Anesthesia, Arthritis, Asthma, Asthma, Cough, Eosinophilic esophagitis (2011), GERD (gastroesophageal reflux disease), History of pulmonary embolus (PE) (01/2021), Indigestion, Shortness of breath, Sputum production, and Wheezing.     Patient Active Problem List   Diagnosis    Allergic rhinitis    Allergic asthma    Eosinophilic esophagitis    GERD (gastroesophageal reflux disease)    Hx pulmonary embolism    Essential hypertension    Elevated LFTs    HLD (hyperlipidemia)    Skin lesion of face    Pulmonary embolism (HCC)    Sleep trouble    Chronic pain of right knee    History of palpitations    Herpes zoster without complication        Allergies:Amlodipine    Health Maintenance: deferred  Review of Systems -included above  Exam:   /78   Pulse 76   Temp 36.7 °C (98 °F) (Temporal)   Resp 16   Ht 1.803 m (5' 11\")   Wt 99.8 kg (220 lb)   SpO2 98%   Body mass index is 30.68 kg/m².   Physical Exam  Constitutional:       Appearance: Normal appearance.   HENT:      Head: Normocephalic and atraumatic.      Right Ear: Hearing, tympanic membrane, ear canal and external ear normal.      Left Ear: Hearing, tympanic membrane, ear canal and external ear normal.      Nose: Nose normal. No congestion or rhinorrhea.      Right Sinus: No maxillary sinus tenderness or frontal sinus tenderness.      Left Sinus: No maxillary sinus tenderness or frontal sinus tenderness.      Mouth/Throat:      Lips: Pink.      Mouth: Mucous membranes are moist.      Pharynx: Posterior oropharyngeal erythema present.      Tonsils: 2+ on the right. 2+ on the left.   Cardiovascular:      Rate and Rhythm: Normal rate and regular rhythm.      Pulses: Normal pulses.      Heart sounds: Normal heart sounds.   Pulmonary:      Effort: Pulmonary effort is normal.      Breath sounds: Normal breath sounds.   Musculoskeletal:      Cervical back: Normal range of motion and neck supple.      " Right lower leg: No edema.      Left lower leg: No edema.   Skin:     General: Skin is warm and dry.   Neurological:      Mental Status: He is alert.       Assessment/Plan:  1. Productive cough  2. Pharyngitis, unspecified etiology  Productive cough x 2 weeks. +ve RSV last week. Some improvement in fatigue, body aches with prednisone. Cough syrup not helping. Switch to BID mucinex. BS are clear on exam today. Continue Trelegy BID inhaler. Received breathing treatment today in clinic. I dont feel he needs redosing of steroid at this time. He will call pulm office and schedule follow up within the next 2 weeks. We swabbed for strep today as he has posterior pharyngeal erythema. I will add antibiotics if strep is positive.    - POCT CEPHEID GROUP A STREP - PCR    3. Moderate persistent asthma, unspecified whether complicated  - levalbuterol (XOPENEX HFA) 45 MCG/ACT inhaler; Inhale 2 Puffs every four hours as needed for Shortness of Breath.  Dispense: 1 Each; Refill: 3  - ipratropium-albuterol (DUONEB) nebulizer solution       Follow up:  Return if symptoms worsen or fail to improve.    Educated in proper administration of medication(s) ordered today including safety, possible SE, risks, benefits, rationale and alternatives to therapy.       Please note that this dictation was created using voice recognition software. I have made every reasonable attempt to correct obvious errors, but I expect that there are errors of grammar and possibly content that I did not discover before finalizing the note.

## 2024-03-15 DIAGNOSIS — I26.94 MULTIPLE SUBSEGMENTAL PULMONARY EMBOLI WITHOUT ACUTE COR PULMONALE (HCC): ICD-10-CM

## 2024-03-15 RX ORDER — APIXABAN 2.5 MG/1
2.5 TABLET, FILM COATED ORAL 2 TIMES DAILY
Qty: 60 TABLET | Refills: 3 | Status: SHIPPED | OUTPATIENT
Start: 2024-03-15

## 2024-05-06 ENCOUNTER — OFFICE VISIT (OUTPATIENT)
Dept: MEDICAL GROUP | Facility: PHYSICIAN GROUP | Age: 54
End: 2024-05-06
Payer: COMMERCIAL

## 2024-05-06 VITALS
OXYGEN SATURATION: 97 % | WEIGHT: 226 LBS | HEART RATE: 75 BPM | HEIGHT: 71 IN | DIASTOLIC BLOOD PRESSURE: 90 MMHG | TEMPERATURE: 99.4 F | SYSTOLIC BLOOD PRESSURE: 140 MMHG | BODY MASS INDEX: 31.64 KG/M2

## 2024-05-06 DIAGNOSIS — I10 ESSENTIAL HYPERTENSION: ICD-10-CM

## 2024-05-06 PROCEDURE — 3080F DIAST BP >= 90 MM HG: CPT | Performed by: NURSE PRACTITIONER

## 2024-05-06 PROCEDURE — 3077F SYST BP >= 140 MM HG: CPT | Performed by: NURSE PRACTITIONER

## 2024-05-06 PROCEDURE — 99214 OFFICE O/P EST MOD 30 MIN: CPT | Performed by: NURSE PRACTITIONER

## 2024-05-06 RX ORDER — LOSARTAN POTASSIUM AND HYDROCHLOROTHIAZIDE 12.5; 5 MG/1; MG/1
1 TABLET ORAL 2 TIMES DAILY
Qty: 180 TABLET | Refills: 1 | Status: SHIPPED | OUTPATIENT
Start: 2024-05-06

## 2024-05-06 ASSESSMENT — ENCOUNTER SYMPTOMS
COUGH: 0
GASTROINTESTINAL NEGATIVE: 1
MUSCULOSKELETAL NEGATIVE: 1
EYES NEGATIVE: 1
SPUTUM PRODUCTION: 0
SHORTNESS OF BREATH: 0
NEUROLOGICAL NEGATIVE: 1
CONSTITUTIONAL NEGATIVE: 1
PSYCHIATRIC NEGATIVE: 1
PALPITATIONS: 0
FEVER: 0

## 2024-05-06 ASSESSMENT — PATIENT HEALTH QUESTIONNAIRE - PHQ9: CLINICAL INTERPRETATION OF PHQ2 SCORE: 0

## 2024-05-06 ASSESSMENT — FIBROSIS 4 INDEX: FIB4 SCORE: 0.76

## 2024-05-06 NOTE — ASSESSMENT & PLAN NOTE
Chronic; goal BP < 130/80  Currently Taking Losartan-HCTZ 50-12.5mg QD; Had edema with Amlodipine  He had recurring palpitations and exertional SOB re:COVID infection; normal EKG at last visit. He has noticed recent increase in BP reading at home. Causing insomnia and nightmares  BP in clinic today 140/90; No CP, dizziness;   - increase Losartan-HCTZ 50-12.5mg to BID   - monitor CMP, lipid annually

## 2024-05-06 NOTE — PROGRESS NOTES
Subjective       CC:   Chief Complaint   Patient presents with    Hypertension    Sleep Problem     Sleeps 5 hours          HPI:   Patient is a 53 y.o. established male patient with medical history listed below here today for evaluation and management of hypertension. He has noticed increased blood pressure readings recently at home.     Patient Active Problem List   Diagnosis    Allergic rhinitis    Allergic asthma    Eosinophilic esophagitis    GERD (gastroesophageal reflux disease)    Hx pulmonary embolism    Essential hypertension    Elevated LFTs    HLD (hyperlipidemia)    Skin lesion of face    Pulmonary embolism (HCC)    Sleep trouble    Chronic pain of right knee    History of palpitations    Herpes zoster without complication       Past Medical History:   Diagnosis Date    Allergic rhinitis     Anesthesia     doesn't do well with novacaine/lidocaine, ponv    Arthritis     Asthma     Asthma     Cough     Eosinophilic esophagitis 2011    Per GI Biopsy    GERD (gastroesophageal reflux disease)     History of pulmonary embolus (PE) 01/2021    Indigestion     Shortness of breath     Sputum production     Wheezing         Past Surgical History:   Procedure Laterality Date    INGUINAL HERNIA LAPAROSCOPIC  8/29/2012    Performed by ROLAND POLLOCK at SURGERY [facility]    INGUINAL HERNIA REPAIR  11/30/2011    Performed by ROLAND POLLOCK at SURGERY [facility]    EGD WITH ASP/BX  10/11    Dr Brice.  BX negative for Piper's        Current Outpatient Medications on File Prior to Visit   Medication Sig Dispense Refill    ELIQUIS 2.5 MG Tab TAKE ONE TABLET BY MOUTH TWICE A DAY 60 Tablet 3    levalbuterol (XOPENEX HFA) 45 MCG/ACT inhaler Inhale 2 Puffs every four hours as needed for Shortness of Breath. 1 Each 3    promethazine-dextromethorphan (PROMETHAZINE-DM) 6.25-15 MG/5ML syrup Take 5 mL by mouth every four hours as needed for Cough. 120 mL 0    benzonatate (TESSALON) 200 MG capsule Take 1 Capsule by  "mouth every 8 hours as needed for Cough. 30 Capsule 0    gabapentin (NEURONTIN) 300 MG Cap TAKE 1 CAPSULE BY MOUTH THREE TIMES DAILY. START WITH 1 CAPSULE AT NIGHT AND. INCREASE AS TOLERATED UP TO 3 TIMES DAILY      oxyCODONE-acetaminophen (PERCOCET) 7.5-325 MG per tablet Take 1 Tablet by mouth every 6 hours as needed.      cyclobenzaprine (FLEXERIL) 10 mg Tab Take 1 Tablet by mouth 3 times a day as needed for Moderate Pain or Muscle Spasms. 30 Tablet 0    TRELEGY ELLIPTA 200-62.5-25 MCG/ACT AEROSOL POWDER, BREATH ACTIVATED       omeprazole (PRILOSEC) 40 MG delayed-release capsule       cetirizine (ZYRTEC) 10 MG TABS Take 1 Tablet by mouth every day.       No current facility-administered medications on file prior to visit.        Health Maintenance: Completed    ROS:  Review of Systems   Constitutional: Negative.  Negative for fever and malaise/fatigue.   HENT: Negative.     Eyes: Negative.    Respiratory:  Negative for cough, sputum production and shortness of breath.    Cardiovascular:  Negative for chest pain, palpitations and leg swelling.   Gastrointestinal: Negative.    Genitourinary: Negative.    Musculoskeletal: Negative.    Neurological: Negative.    Endo/Heme/Allergies: Negative.    Psychiatric/Behavioral: Negative.       Objective       Exam:  BP (!) 140/90 (BP Location: Left arm, Patient Position: Sitting, BP Cuff Size: Adult)   Pulse 75   Temp 37.4 °C (99.4 °F) (Temporal)   Ht 1.803 m (5' 11\")   Wt 103 kg (226 lb)   SpO2 97%   BMI 31.52 kg/m²  Body mass index is 31.52 kg/m².    Physical Exam  Constitutional:       Appearance: Normal appearance.   Cardiovascular:      Rate and Rhythm: Normal rate and regular rhythm.      Pulses: Normal pulses.      Heart sounds: Normal heart sounds.   Pulmonary:      Effort: Pulmonary effort is normal.      Breath sounds: Normal breath sounds.   Musculoskeletal:      Right lower leg: No edema.      Left lower leg: No edema.   Skin:     General: Skin is warm and " dry.   Neurological:      Mental Status: He is alert.   Psychiatric:         Mood and Affect: Mood normal.         Behavior: Behavior normal.         Thought Content: Thought content normal.         Judgment: Judgment normal.         Assessment & Plan       53 y.o. male with the following -     Problem List Items Addressed This Visit       Essential hypertension     Chronic; goal BP < 130/80  Currently Taking Losartan-HCTZ 50-12.5mg QD; Had edema with Amlodipine  He had recurring palpitations and exertional SOB re:COVID infection; normal EKG at last visit. He has noticed recent increase in BP reading at home. Causing insomnia and nightmares  BP in clinic today 140/90; No CP, dizziness;   - increase Losartan-HCTZ 50-12.5mg to BID   - monitor CMP, lipid annually         Relevant Medications    losartan-hydrochlorothiazide (HYZAAR) 50-12.5 MG per tablet       Medications Prescribed Today:  1. Essential hypertension  - losartan-hydrochlorothiazide (HYZAAR) 50-12.5 MG per tablet; Take 1 Tablet by mouth 2 times a day.  Dispense: 180 Tablet; Refill: 1        Educated in proper administration of medication(s) ordered today including safety, possible SE, risks, benefits, rationale and alternatives to therapy.       Return in about 2 weeks (around 5/20/2024) for htn.    Please note that this dictation was created using voice recognition software. I have made every reasonable attempt to correct obvious errors, but I expect that there are errors of grammar and possibly content that I did not discover before finalizing the note.

## 2024-05-08 ENCOUNTER — DOCUMENTATION (OUTPATIENT)
Dept: HEALTH INFORMATION MANAGEMENT | Facility: OTHER | Age: 54
End: 2024-05-08
Payer: COMMERCIAL

## 2024-07-30 ENCOUNTER — TELEPHONE (OUTPATIENT)
Dept: HEALTH INFORMATION MANAGEMENT | Facility: OTHER | Age: 54
End: 2024-07-30
Payer: COMMERCIAL

## 2024-08-01 ENCOUNTER — OFFICE VISIT (OUTPATIENT)
Dept: URGENT CARE | Facility: PHYSICIAN GROUP | Age: 54
End: 2024-08-01
Payer: COMMERCIAL

## 2024-08-01 ENCOUNTER — APPOINTMENT (OUTPATIENT)
Dept: RADIOLOGY | Facility: MEDICAL CENTER | Age: 54
End: 2024-08-01
Attending: EMERGENCY MEDICINE
Payer: COMMERCIAL

## 2024-08-01 ENCOUNTER — HOSPITAL ENCOUNTER (EMERGENCY)
Facility: MEDICAL CENTER | Age: 54
End: 2024-08-01
Attending: EMERGENCY MEDICINE
Payer: COMMERCIAL

## 2024-08-01 VITALS
DIASTOLIC BLOOD PRESSURE: 78 MMHG | HEART RATE: 79 BPM | SYSTOLIC BLOOD PRESSURE: 136 MMHG | WEIGHT: 222 LBS | OXYGEN SATURATION: 95 % | RESPIRATION RATE: 14 BRPM | BODY MASS INDEX: 31.08 KG/M2 | TEMPERATURE: 97 F | HEIGHT: 71 IN

## 2024-08-01 VITALS
WEIGHT: 225.09 LBS | HEIGHT: 71 IN | OXYGEN SATURATION: 92 % | BODY MASS INDEX: 31.51 KG/M2 | SYSTOLIC BLOOD PRESSURE: 139 MMHG | HEART RATE: 67 BPM | TEMPERATURE: 98.4 F | RESPIRATION RATE: 18 BRPM | DIASTOLIC BLOOD PRESSURE: 91 MMHG

## 2024-08-01 DIAGNOSIS — R07.9 CHEST PAIN, UNSPECIFIED TYPE: ICD-10-CM

## 2024-08-01 DIAGNOSIS — M62.838 MUSCLE SPASM: ICD-10-CM

## 2024-08-01 DIAGNOSIS — S16.1XXA STRAIN OF NECK MUSCLE, INITIAL ENCOUNTER: ICD-10-CM

## 2024-08-01 LAB
ALBUMIN SERPL BCP-MCNC: 4.3 G/DL (ref 3.2–4.9)
ALBUMIN/GLOB SERPL: 1.4 G/DL
ALP SERPL-CCNC: 84 U/L (ref 30–99)
ALT SERPL-CCNC: 37 U/L (ref 2–50)
ANION GAP SERPL CALC-SCNC: 12 MMOL/L (ref 7–16)
AST SERPL-CCNC: 24 U/L (ref 12–45)
BASOPHILS # BLD AUTO: 0.7 % (ref 0–1.8)
BASOPHILS # BLD: 0.05 K/UL (ref 0–0.12)
BILIRUB SERPL-MCNC: 1.3 MG/DL (ref 0.1–1.5)
BUN SERPL-MCNC: 16 MG/DL (ref 8–22)
CALCIUM ALBUM COR SERPL-MCNC: 9.2 MG/DL (ref 8.5–10.5)
CALCIUM SERPL-MCNC: 9.4 MG/DL (ref 8.4–10.2)
CHLORIDE SERPL-SCNC: 103 MMOL/L (ref 96–112)
CO2 SERPL-SCNC: 24 MMOL/L (ref 20–33)
CREAT SERPL-MCNC: 1.09 MG/DL (ref 0.5–1.4)
D DIMER PPP IA.FEU-MCNC: 0.38 UG/ML (FEU) (ref 0–0.5)
EKG IMPRESSION: NORMAL
EOSINOPHIL # BLD AUTO: 0.1 K/UL (ref 0–0.51)
EOSINOPHIL NFR BLD: 1.4 % (ref 0–6.9)
ERYTHROCYTE [DISTWIDTH] IN BLOOD BY AUTOMATED COUNT: 39.1 FL (ref 35.9–50)
GFR SERPLBLD CREATININE-BSD FMLA CKD-EPI: 82 ML/MIN/1.73 M 2
GLOBULIN SER CALC-MCNC: 3 G/DL (ref 1.9–3.5)
GLUCOSE SERPL-MCNC: 99 MG/DL (ref 65–99)
HCT VFR BLD AUTO: 45.3 % (ref 42–52)
HGB BLD-MCNC: 16.5 G/DL (ref 14–18)
IMM GRANULOCYTES # BLD AUTO: 0.02 K/UL (ref 0–0.11)
IMM GRANULOCYTES NFR BLD AUTO: 0.3 % (ref 0–0.9)
LYMPHOCYTES # BLD AUTO: 2.02 K/UL (ref 1–4.8)
LYMPHOCYTES NFR BLD: 29 % (ref 22–41)
MCH RBC QN AUTO: 32 PG (ref 27–33)
MCHC RBC AUTO-ENTMCNC: 36.4 G/DL (ref 32.3–36.5)
MCV RBC AUTO: 88 FL (ref 81.4–97.8)
MONOCYTES # BLD AUTO: 0.45 K/UL (ref 0–0.85)
MONOCYTES NFR BLD AUTO: 6.5 % (ref 0–13.4)
NEUTROPHILS # BLD AUTO: 4.33 K/UL (ref 1.82–7.42)
NEUTROPHILS NFR BLD: 62.1 % (ref 44–72)
NRBC # BLD AUTO: 0 K/UL
NRBC BLD-RTO: 0 /100 WBC (ref 0–0.2)
PLATELET # BLD AUTO: 210 K/UL (ref 164–446)
PMV BLD AUTO: 9.9 FL (ref 9–12.9)
POTASSIUM SERPL-SCNC: 3.9 MMOL/L (ref 3.6–5.5)
PROT SERPL-MCNC: 7.3 G/DL (ref 6–8.2)
RBC # BLD AUTO: 5.15 M/UL (ref 4.7–6.1)
SODIUM SERPL-SCNC: 139 MMOL/L (ref 135–145)
TROPONIN T SERPL-MCNC: 7 NG/L (ref 6–19)
WBC # BLD AUTO: 7 K/UL (ref 4.8–10.8)

## 2024-08-01 PROCEDURE — 84484 ASSAY OF TROPONIN QUANT: CPT

## 2024-08-01 PROCEDURE — 80053 COMPREHEN METABOLIC PANEL: CPT

## 2024-08-01 PROCEDURE — 99283 EMERGENCY DEPT VISIT LOW MDM: CPT

## 2024-08-01 PROCEDURE — 71045 X-RAY EXAM CHEST 1 VIEW: CPT

## 2024-08-01 PROCEDURE — 85025 COMPLETE CBC W/AUTO DIFF WBC: CPT

## 2024-08-01 PROCEDURE — 93000 ELECTROCARDIOGRAM COMPLETE: CPT | Performed by: PHYSICIAN ASSISTANT

## 2024-08-01 PROCEDURE — 36415 COLL VENOUS BLD VENIPUNCTURE: CPT

## 2024-08-01 PROCEDURE — 85379 FIBRIN DEGRADATION QUANT: CPT

## 2024-08-01 PROCEDURE — 99213 OFFICE O/P EST LOW 20 MIN: CPT | Performed by: PHYSICIAN ASSISTANT

## 2024-08-01 PROCEDURE — 3078F DIAST BP <80 MM HG: CPT | Performed by: PHYSICIAN ASSISTANT

## 2024-08-01 PROCEDURE — 93005 ELECTROCARDIOGRAM TRACING: CPT | Performed by: EMERGENCY MEDICINE

## 2024-08-01 PROCEDURE — 94760 N-INVAS EAR/PLS OXIMETRY 1: CPT

## 2024-08-01 PROCEDURE — 3075F SYST BP GE 130 - 139MM HG: CPT | Performed by: PHYSICIAN ASSISTANT

## 2024-08-01 RX ORDER — ALBUTEROL SULFATE 90 UG/1
1-2 AEROSOL, METERED RESPIRATORY (INHALATION) EVERY 6 HOURS PRN
COMMUNITY

## 2024-08-01 ASSESSMENT — HEART SCORE
AGE: 45-64
TROPONIN: LESS THAN OR EQUAL TO NORMAL LIMIT
ECG: NORMAL
HEART SCORE: 2
HISTORY: SLIGHTLY SUSPICIOUS
RISK FACTORS: 1-2 RISK FACTORS

## 2024-08-01 ASSESSMENT — PAIN DESCRIPTION - PAIN TYPE: TYPE: ACUTE PAIN

## 2024-08-01 ASSESSMENT — FIBROSIS 4 INDEX
FIB4 SCORE: 0.76
FIB4 SCORE: 0.76
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
## 2024-08-01 NOTE — ED NOTES
Pt was seen at [facility], sent to , and referred to ED for further evaluation.   Pt presents with neck pain, radiating down his arm and chest.  Pt has hx of bilateral pulmonary embolism x4 years ago
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
## 2024-08-01 NOTE — ED NOTES
Pharmacy Medication Reconciliation      ~Medication reconciliation updated and complete per patient at bedside   ~Allergies have been verified and updated   ~No oral ABX within the last 30 days  ~Patient home pharmacy :  [facility]       ~Anticoagulants (rivaroxaban, apixaban, edoxaban, dabigatran, warfarin, enoxaparin) taken in the last 14 days? Yes  ~Anticoagulant: Eliquis, Last dose: 8/1/2024

## 2024-08-13 DIAGNOSIS — I26.94 MULTIPLE SUBSEGMENTAL PULMONARY EMBOLI WITHOUT ACUTE COR PULMONALE (HCC): ICD-10-CM

## 2024-08-14 RX ORDER — APIXABAN 2.5 MG/1
2.5 TABLET, FILM COATED ORAL 2 TIMES DAILY
Qty: 60 TABLET | Refills: 3 | Status: SHIPPED | OUTPATIENT
Start: 2024-08-14

## 2024-09-24 ENCOUNTER — OFFICE VISIT (OUTPATIENT)
Dept: MEDICAL GROUP | Facility: PHYSICIAN GROUP | Age: 54
End: 2024-09-24
Payer: COMMERCIAL

## 2024-09-24 VITALS
RESPIRATION RATE: 16 BRPM | DIASTOLIC BLOOD PRESSURE: 74 MMHG | OXYGEN SATURATION: 95 % | HEART RATE: 81 BPM | WEIGHT: 230.8 LBS | HEIGHT: 71 IN | TEMPERATURE: 98.5 F | SYSTOLIC BLOOD PRESSURE: 120 MMHG | BODY MASS INDEX: 32.31 KG/M2

## 2024-09-24 DIAGNOSIS — R79.89 ELEVATED LFTS: ICD-10-CM

## 2024-09-24 DIAGNOSIS — M54.2 NECK PAIN: ICD-10-CM

## 2024-09-24 DIAGNOSIS — J45.30 MILD PERSISTENT EXTRINSIC ASTHMA WITHOUT COMPLICATION: ICD-10-CM

## 2024-09-24 DIAGNOSIS — E55.9 VITAMIN D DEFICIENCY: ICD-10-CM

## 2024-09-24 DIAGNOSIS — I10 ESSENTIAL HYPERTENSION: ICD-10-CM

## 2024-09-24 DIAGNOSIS — Z13.228 SCREENING FOR ENDOCRINE, METABOLIC AND IMMUNITY DISORDER: ICD-10-CM

## 2024-09-24 DIAGNOSIS — E78.5 HYPERLIPIDEMIA, UNSPECIFIED HYPERLIPIDEMIA TYPE: ICD-10-CM

## 2024-09-24 DIAGNOSIS — Z86.711 HX PULMONARY EMBOLISM: ICD-10-CM

## 2024-09-24 DIAGNOSIS — K20.0 EOSINOPHILIC ESOPHAGITIS: ICD-10-CM

## 2024-09-24 DIAGNOSIS — Z13.0 SCREENING FOR ENDOCRINE, METABOLIC AND IMMUNITY DISORDER: ICD-10-CM

## 2024-09-24 DIAGNOSIS — Z13.29 SCREENING FOR ENDOCRINE, METABOLIC AND IMMUNITY DISORDER: ICD-10-CM

## 2024-09-24 PROCEDURE — 3078F DIAST BP <80 MM HG: CPT

## 2024-09-24 PROCEDURE — 99214 OFFICE O/P EST MOD 30 MIN: CPT

## 2024-09-24 PROCEDURE — 3074F SYST BP LT 130 MM HG: CPT

## 2024-09-24 ASSESSMENT — FIBROSIS 4 INDEX: FIB4 SCORE: 0.96
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
## 2024-12-10 ENCOUNTER — TELEMEDICINE (OUTPATIENT)
Dept: MEDICAL GROUP | Facility: PHYSICIAN GROUP | Age: 54
End: 2024-12-10
Payer: COMMERCIAL

## 2024-12-10 VITALS — HEIGHT: 71 IN | WEIGHT: 220 LBS | BODY MASS INDEX: 30.8 KG/M2

## 2024-12-10 DIAGNOSIS — R50.9 FEVER, UNSPECIFIED FEVER CAUSE: ICD-10-CM

## 2024-12-10 DIAGNOSIS — R52 BODY ACHES: ICD-10-CM

## 2024-12-10 LAB
FLUAV RNA SPEC QL NAA+PROBE: NEGATIVE
FLUBV RNA SPEC QL NAA+PROBE: NEGATIVE
RSV RNA SPEC QL NAA+PROBE: NEGATIVE
SARS-COV-2 RNA RESP QL NAA+PROBE: NEGATIVE

## 2024-12-10 PROCEDURE — 99213 OFFICE O/P EST LOW 20 MIN: CPT | Performed by: FAMILY MEDICINE

## 2024-12-10 PROCEDURE — 0241U POCT CEPHEID COV-2, FLU A/B, RSV - PCR: CPT | Performed by: FAMILY MEDICINE

## 2024-12-10 RX ORDER — AMOXICILLIN 875 MG/1
875 TABLET, COATED ORAL 2 TIMES DAILY
Qty: 14 TABLET | Refills: 0 | Status: SHIPPED | OUTPATIENT
Start: 2024-12-10 | End: 2024-12-17

## 2024-12-10 ASSESSMENT — FIBROSIS 4 INDEX: FIB4 SCORE: 0.98

## 2024-12-10 NOTE — PROGRESS NOTES
Virtual Visit: Established Patient   This visit was conducted via Teams using secure and encrypted videoconferencing technology.   The patient was in their home in the state of [place].    The patient's identity was confirmed and verbal consent was obtained for this virtual visit.     Subjective:   CC:   Chief Complaint   Patient presents with    Nasal Congestion     X 3 days    Pharyngitis    Body Aches    Cough       Prakash Kaur is a 54 y.o. male presenting due to fact he has been sick for the last 3 days now he is coughing up some yellowish material he has some fever and chills with this.  Patient states the highest temperature he ran was 101.  Patient denies any diarrhea with this.    ROS     Current medicines (including changes today)  Current Outpatient Medications   Medication Sig Dispense Refill    amoxicillin (AMOXIL) 875 MG tablet Take 1 Tablet by mouth 2 times a day for 7 days. 14 Tablet 0    ELIQUIS 2.5 MG Tab TAKE ONE TABLET BY MOUTH TWICE A DAY 60 Tablet 3    albuterol 108 (90 Base) MCG/ACT Aero Soln inhalation aerosol Inhale 1-2 Puffs every 6 hours as needed for Shortness of Breath.      losartan-hydrochlorothiazide (HYZAAR) 50-12.5 MG per tablet Take 1 Tablet by mouth 2 times a day. 180 Tablet 1    TRELEGY ELLIPTA 200-62.5-25 MCG/ACT AEROSOL POWDER, BREATH ACTIVATED Inhale 1 Puff 2 times a day.      omeprazole (PRILOSEC) 40 MG delayed-release capsule Take 40 mg by mouth every day.      cetirizine (ZYRTEC) 10 MG TABS Take 1 Tablet by mouth every day.       No current facility-administered medications for this visit.       Patient Active Problem List    Diagnosis Date Noted    Herpes zoster without complication 06/23/2023    History of palpitations 08/05/2022    Chronic pain of right knee 09/14/2021    Sleep trouble 07/29/2021    Essential hypertension 02/03/2021    Elevated LFTs 02/03/2021    HLD (hyperlipidemia) 02/03/2021    Skin lesion of face 02/03/2021    Hx pulmonary embolism 01/04/2021  "   GERD (gastroesophageal reflux disease)     Eosinophilic esophagitis     Allergic asthma 08/26/2011    Allergic rhinitis         Objective:   Ht 1.803 m (5' 11\")   Wt 99.8 kg (220 lb)   BMI 30.68 kg/m²     Physical Exam:  Constitutional: Alert, no distress, well-groomed.  Skin: No rashes in visible areas.  Eye: Round. Conjunctiva clear  ENMT: Lips pink without lesions, good dentition, moist mucous membranes.   Neck: Moves freely without pain.  Respiratory: Unlabored respiratory effort, no cough or audible wheeze  Psych: Alert and oriented x3, normal affect and mood.     Assessment and Plan:   The following treatment plan was discussed:     1. Fever, unspecified fever cause  I did contact patient concerning his test results everything is negative it could be this originally as a virus but with the coughing up yellow material would recommend starting him on antibiotics patient very agreeable with the plan.  I encourage patient to push plenty of fluids not just water Gatorade or chicken noodle soup or any type of broth would be fine.  If patient continues having problems we do need to see him back.  I did offer to schedule him a follow-up appointment with his PCP but at this time he wants to wait and see how he feels if he continues to feel bad he will make a follow-up appointment.  - POCT CoV-2, Flu A/B, RSV by PCR    2. Body aches  - POCT CoV-2, Flu A/B, RSV by PCR      Follow-up: Return if symptoms worsen or fail to improve.         "

## 2025-01-31 DIAGNOSIS — I26.94 MULTIPLE SUBSEGMENTAL PULMONARY EMBOLI WITHOUT ACUTE COR PULMONALE (HCC): ICD-10-CM

## 2025-02-03 RX ORDER — APIXABAN 2.5 MG/1
2.5 TABLET, FILM COATED ORAL 2 TIMES DAILY
Qty: 60 TABLET | Refills: 3 | Status: SHIPPED | OUTPATIENT
Start: 2025-02-03

## 2025-02-14 ENCOUNTER — HOSPITAL ENCOUNTER (OUTPATIENT)
Dept: HEMATOLOGY ONCOLOGY | Facility: MEDICAL CENTER | Age: 55
End: 2025-02-14
Attending: STUDENT IN AN ORGANIZED HEALTH CARE EDUCATION/TRAINING PROGRAM
Payer: COMMERCIAL

## 2025-02-14 VITALS
SYSTOLIC BLOOD PRESSURE: 130 MMHG | BODY MASS INDEX: 31.75 KG/M2 | HEART RATE: 83 BPM | WEIGHT: 226.8 LBS | OXYGEN SATURATION: 94 % | DIASTOLIC BLOOD PRESSURE: 90 MMHG | RESPIRATION RATE: 16 BRPM | HEIGHT: 71 IN | TEMPERATURE: 98.2 F

## 2025-02-14 DIAGNOSIS — Z86.711 HX PULMONARY EMBOLISM: ICD-10-CM

## 2025-02-14 PROCEDURE — 99212 OFFICE O/P EST SF 10 MIN: CPT | Performed by: STUDENT IN AN ORGANIZED HEALTH CARE EDUCATION/TRAINING PROGRAM

## 2025-02-14 PROCEDURE — 99213 OFFICE O/P EST LOW 20 MIN: CPT | Performed by: STUDENT IN AN ORGANIZED HEALTH CARE EDUCATION/TRAINING PROGRAM

## 2025-02-14 ASSESSMENT — ENCOUNTER SYMPTOMS
ORTHOPNEA: 0
SINUS PAIN: 0
MYALGIAS: 0
NAUSEA: 0
CONSTIPATION: 0
WHEEZING: 0
PALPITATIONS: 0
BLURRED VISION: 0
HEARTBURN: 0
BACK PAIN: 0
CHILLS: 0
HEADACHES: 0
SORE THROAT: 0
BLOOD IN STOOL: 0
TINGLING: 0
DIARRHEA: 0
INSOMNIA: 0
SHORTNESS OF BREATH: 0
ABDOMINAL PAIN: 0
VOMITING: 0
BRUISES/BLEEDS EASILY: 0
WEAKNESS: 0
DOUBLE VISION: 0
DIAPHORESIS: 0
COUGH: 0
DIZZINESS: 0
SPUTUM PRODUCTION: 0
WEIGHT LOSS: 0
FEVER: 0
NERVOUS/ANXIOUS: 0

## 2025-02-14 ASSESSMENT — PATIENT HEALTH QUESTIONNAIRE - PHQ9: CLINICAL INTERPRETATION OF PHQ2 SCORE: 0

## 2025-02-14 ASSESSMENT — FIBROSIS 4 INDEX: FIB4 SCORE: 0.98

## 2025-02-14 NOTE — PROGRESS NOTES
Follow Up Note:  Hematology/Oncology      Primary Care:  Margarito Adan M.D.    Diagnosis: Pulmonary embolism after COVID 19 vaccination    Chief Complaint: Follow up on anticoagulation    Current Treatment: Apixaban 2.5 mg PO BID    Prior Treatment: NA    Oncology History of Presenting Illness:  Prakash Kaur is a 51 y.o.  man who presents to the clinic for follow up, having developed pulmonary emboli a few days after receiving the COVID 19 vaccine. He was started on anticoagulation and had a partial hypercoagulable work up, which was negative. He has no family history of clotting disorders. He has tolerated apixaban well and has had no complaints. He has received the second COVID 19 vaccine and had no issues with it. He has been on modified duty as a , and has done well thus far.     Treatment History:   12/30/2020: Moderna vaccine  01/04/21: PE diagnosed, started on apixaban 5 mg PO BID    Interval History:  Patient is here for follow up visit.  He is doing well and has no complaints. He had some chest pain 6 months ago and had it checked out because it was associated with right shoulder pain, but work up was negative. He feels fine otherwise and has no complaints. He has no procedures coming up.     Allergies as of 02/14/2025 - Reviewed 02/14/2025   Allergen Reaction Noted    Amlodipine Unspecified 09/14/2021         Current Outpatient Medications:     ELIQUIS 2.5 MG Tab, TAKE ONE TABLET BY MOUTH TWICE A DAY, Disp: 60 Tablet, Rfl: 3    albuterol 108 (90 Base) MCG/ACT Aero Soln inhalation aerosol, Inhale 1-2 Puffs every 6 hours as needed for Shortness of Breath., Disp: , Rfl:     losartan-hydrochlorothiazide (HYZAAR) 50-12.5 MG per tablet, Take 1 Tablet by mouth 2 times a day., Disp: 180 Tablet, Rfl: 1    TRELEGY ELLIPTA 200-62.5-25 MCG/ACT AEROSOL POWDER, BREATH ACTIVATED, Inhale 1 Puff 2 times a day., Disp: , Rfl:     omeprazole (PRILOSEC) 40 MG delayed-release capsule, Take  "40 mg by mouth every day., Disp: , Rfl:     cetirizine (ZYRTEC) 10 MG TABS, Take 1 Tablet by mouth every day., Disp: , Rfl:       Review of Systems:  Review of Systems   Constitutional:  Negative for chills, diaphoresis, fever, malaise/fatigue and weight loss.   HENT:  Negative for hearing loss, nosebleeds, sinus pain and sore throat.    Eyes:  Negative for blurred vision and double vision.   Respiratory:  Negative for cough, sputum production, shortness of breath and wheezing.    Cardiovascular:  Negative for chest pain, palpitations, orthopnea and leg swelling.   Gastrointestinal:  Negative for abdominal pain, blood in stool, constipation, diarrhea, heartburn, melena, nausea and vomiting.   Genitourinary:  Negative for dysuria, frequency, hematuria and urgency.   Musculoskeletal:  Negative for back pain, joint pain and myalgias.   Skin:  Negative for rash.   Neurological:  Negative for dizziness, tingling, weakness and headaches.   Endo/Heme/Allergies:  Does not bruise/bleed easily.   Psychiatric/Behavioral:  The patient is not nervous/anxious and does not have insomnia.          Physical Exam:  Vitals:    02/14/25 0954   BP: (!) 130/90   BP Location: Right arm   Patient Position: Sitting   BP Cuff Size: Adult   Pulse: 83   Resp: 16   Temp: 36.8 °C (98.2 °F)   TempSrc: Temporal   SpO2: 94%   Weight: 103 kg (226 lb 12.8 oz)   Height: 1.803 m (5' 11\")       DESC; KARNOFSKY SCALE WITH ECOG EQUIVALENT: 100, Fully active, able to carry on all pre-disease performed without restriction (ECOG equivalent 0)    DISTRESS LEVEL: no apparent distress    Physical Exam  Vitals and nursing note reviewed.   Constitutional:       General: He is awake. He is not in acute distress.     Appearance: Normal appearance. He is normal weight. He is not ill-appearing, toxic-appearing or diaphoretic.   HENT:      Head: Normocephalic and atraumatic.      Nose: Nose normal. No congestion.      Mouth/Throat:      Pharynx: Oropharynx is clear. " No oropharyngeal exudate or posterior oropharyngeal erythema.   Eyes:      General: No scleral icterus.     Extraocular Movements: Extraocular movements intact.      Conjunctiva/sclera: Conjunctivae normal.      Pupils: Pupils are equal, round, and reactive to light.   Cardiovascular:      Rate and Rhythm: Normal rate and regular rhythm.      Pulses: Normal pulses.      Heart sounds: Normal heart sounds. No murmur heard.     No friction rub. No gallop.   Pulmonary:      Effort: Pulmonary effort is normal.      Breath sounds: Normal breath sounds. No decreased air movement. No wheezing, rhonchi or rales.   Abdominal:      General: Bowel sounds are normal. There is no distension.      Tenderness: There is no abdominal tenderness.   Musculoskeletal:         General: No deformity. Normal range of motion.      Cervical back: Normal range of motion and neck supple. No tenderness.      Right lower leg: No edema.      Left lower leg: No edema.   Lymphadenopathy:      Cervical: No cervical adenopathy.      Upper Body:      Right upper body: No axillary adenopathy.      Left upper body: No axillary adenopathy.      Lower Body: No right inguinal adenopathy. No left inguinal adenopathy.   Skin:     General: Skin is warm and dry.      Coloration: Skin is not jaundiced.      Findings: No erythema or rash.   Neurological:      General: No focal deficit present.      Mental Status: He is alert and oriented to person, place, and time.      Sensory: Sensation is intact.      Motor: Motor function is intact. No weakness.      Gait: Gait is intact.   Psychiatric:         Attention and Perception: Attention normal.         Mood and Affect: Mood normal.         Behavior: Behavior normal. Behavior is cooperative.         Thought Content: Thought content normal.         Judgment: Judgment normal.           Labs:  No visits with results within 7 Day(s) from this visit.   Latest known visit with results is:   Telemedicine on 12/10/2024    Component Date Value Ref Range Status    SARS-CoV-2 by PCR 12/10/2024 Negative  Negative, Invalid Final    Influenza virus A RNA 12/10/2024 Negative  Negative, Invalid Final    Influenza virus B, PCR 12/10/2024 Negative  Negative, Invalid Final    RSV, PCR 12/10/2024 Negative  Negative, Invalid Final       Imaging:     All listed images below have been independently reviewed by me. I agree with the findings as summarized below:    No results found.    Pathology:  NA    Assessment & Plan:  1. Hx pulmonary embolism            This is a now 54 year old  man with a history of PE, seemingly provoked by the Moderna COVID vaccine. He has been on apixaban and has tolerated this well.     Current Diagnosis and Staging: Provoked pulmonary embolism    Update: Patient doing well with prophylactic dosing and would like to stay on this. Return in 1 year for follow up.     Treatment Plan: Apixaban 2.5 mg PO BID    Treatment Citation: NA    Plan of Care:    Primary Therapy: Apixaban 2.5 mg PO BID (prophylactic dosing).   Supportive Therapy: NA  Toxicity: NA  Labs: CBC with diff, CMP annually  Imaging: NA  Treatment Planning: Possibly provoked PE in setting of COVID vaccine (though rare occurrence). Patient wishes to remain on prophylactic dosing of apixaban at this time.   Consultations: NA  Code Status: Full  Miscellaneous: NA   Return for Follow Up: 1 year    Any questions and concerns raised by the patient were answered to the best of my ability. Thank you for allowing me to participate in the care for this patient. Please feel free to contact me for any questions or concerns.       Total time spent on chart review, clinic encounter, and documentation: 17 minutes.

## 2025-03-04 DIAGNOSIS — I10 ESSENTIAL HYPERTENSION: ICD-10-CM

## 2025-03-04 NOTE — TELEPHONE ENCOUNTER
Received request via: Pharmacy    Was the patient seen in the last year in this department? Yes    Does the patient have an active prescription (recently filled or refills available) for medication(s) requested? No    Pharmacy Name: [id number] - [place], [place] - [address]     Does the patient have intermediate Plus and need 100-day supply? (This applies to ALL medications) Patient does not have SCP

## 2025-03-05 RX ORDER — LOSARTAN POTASSIUM AND HYDROCHLOROTHIAZIDE 12.5; 5 MG/1; MG/1
1 TABLET ORAL 2 TIMES DAILY
Qty: 180 TABLET | Refills: 1 | Status: SHIPPED | OUTPATIENT
Start: 2025-03-05

## 2025-04-01 ENCOUNTER — APPOINTMENT (OUTPATIENT)
Dept: MEDICAL GROUP | Facility: PHYSICIAN GROUP | Age: 55
End: 2025-04-01
Payer: COMMERCIAL

## 2025-04-29 ENCOUNTER — APPOINTMENT (OUTPATIENT)
Dept: MEDICAL GROUP | Facility: PHYSICIAN GROUP | Age: 55
End: 2025-04-29
Payer: COMMERCIAL

## 2025-08-24 DIAGNOSIS — I26.94 MULTIPLE SUBSEGMENTAL PULMONARY EMBOLI WITHOUT ACUTE COR PULMONALE (HCC): ICD-10-CM

## 2025-08-25 RX ORDER — APIXABAN 2.5 MG/1
2.5 TABLET, FILM COATED ORAL 2 TIMES DAILY
Qty: 60 TABLET | Refills: 3 | Status: SHIPPED | OUTPATIENT
Start: 2025-08-25